# Patient Record
Sex: FEMALE | Race: WHITE | HISPANIC OR LATINO | Employment: OTHER | URBAN - METROPOLITAN AREA
[De-identification: names, ages, dates, MRNs, and addresses within clinical notes are randomized per-mention and may not be internally consistent; named-entity substitution may affect disease eponyms.]

---

## 2018-01-08 ENCOUNTER — TELEPHONE (OUTPATIENT)
Dept: ADMINISTRATIVE | Facility: HOSPITAL | Age: 43
End: 2018-01-08

## 2018-04-12 PROBLEM — F41.9 ANXIETY: Status: ACTIVE | Noted: 2018-04-12

## 2018-04-12 PROBLEM — R01.1 HEART MURMUR: Status: ACTIVE | Noted: 2018-04-12

## 2018-04-12 PROBLEM — J45.41 MODERATE PERSISTENT ASTHMA WITH ACUTE EXACERBATION: Status: ACTIVE | Noted: 2018-04-12

## 2020-09-02 PROBLEM — J45.41 MODERATE PERSISTENT ASTHMA WITH ACUTE EXACERBATION: Status: RESOLVED | Noted: 2018-04-12 | Resolved: 2020-09-02

## 2021-07-01 ENCOUNTER — PATIENT MESSAGE (OUTPATIENT)
Dept: ADMINISTRATIVE | Facility: OTHER | Age: 46
End: 2021-07-01

## 2022-06-09 PROBLEM — J45.901 ASTHMA EXACERBATION: Status: ACTIVE | Noted: 2018-04-12

## 2022-06-09 PROBLEM — R06.03 ACUTE RESPIRATORY DISTRESS: Status: RESOLVED | Noted: 2022-06-09 | Resolved: 2022-06-09

## 2022-06-09 PROBLEM — R06.03 ACUTE RESPIRATORY DISTRESS: Status: ACTIVE | Noted: 2022-06-09

## 2022-06-11 PROBLEM — J96.01 ACUTE HYPOXEMIC RESPIRATORY FAILURE: Status: ACTIVE | Noted: 2022-06-11

## 2022-06-13 PROBLEM — J96.01 ACUTE HYPOXEMIC RESPIRATORY FAILURE: Status: ACTIVE | Noted: 2018-04-12

## 2022-06-13 PROBLEM — J44.1 COPD EXACERBATION: Status: ACTIVE | Noted: 2022-06-13

## 2022-06-13 PROBLEM — J96.01 ACUTE HYPOXEMIC RESPIRATORY FAILURE: Status: RESOLVED | Noted: 2018-04-12 | Resolved: 2022-06-13

## 2023-03-29 PROBLEM — R06.02 SOB (SHORTNESS OF BREATH): Status: ACTIVE | Noted: 2023-03-29

## 2023-03-29 PROBLEM — R06.02 SOB (SHORTNESS OF BREATH): Status: RESOLVED | Noted: 2023-03-29 | Resolved: 2023-03-29

## 2023-03-29 PROBLEM — J44.1 COPD EXACERBATION: Status: RESOLVED | Noted: 2022-06-13 | Resolved: 2023-03-29

## 2023-07-10 PROBLEM — I35.0 AORTIC STENOSIS, MODERATE: Status: ACTIVE | Noted: 2018-04-12

## 2023-09-05 PROBLEM — R94.31 ABNORMAL EKG: Status: ACTIVE | Noted: 2023-09-05

## 2023-09-05 PROBLEM — Q23.81 BICUSPID AORTIC VALVE: Status: ACTIVE | Noted: 2023-09-05

## 2023-09-05 PROBLEM — I34.0 NONRHEUMATIC MITRAL VALVE REGURGITATION: Status: ACTIVE | Noted: 2023-09-05

## 2023-09-05 PROBLEM — R06.09 DYSPNEA ON EXERTION: Status: ACTIVE | Noted: 2023-09-05

## 2023-09-05 PROBLEM — Q26.1 PERSISTENT LEFT SVC (SUPERIOR VENA CAVA): Status: ACTIVE | Noted: 2023-09-05

## 2023-09-05 PROBLEM — Q23.1 BICUSPID AORTIC VALVE: Status: ACTIVE | Noted: 2023-09-05

## 2023-09-05 PROBLEM — I35.0 AORTIC STENOSIS, MODERATE: Status: RESOLVED | Noted: 2018-04-12 | Resolved: 2023-09-05

## 2023-09-05 PROBLEM — Z01.818 PRE-OP EVALUATION: Status: ACTIVE | Noted: 2023-09-05

## 2023-09-05 PROBLEM — I51.7 LEFT ATRIAL ENLARGEMENT: Status: ACTIVE | Noted: 2023-09-05

## 2023-09-05 PROBLEM — I35.1 NONRHEUMATIC AORTIC VALVE INSUFFICIENCY: Status: ACTIVE | Noted: 2023-09-05

## 2023-09-05 PROBLEM — I35.0 NONRHEUMATIC AORTIC VALVE STENOSIS: Status: ACTIVE | Noted: 2023-09-05

## 2023-10-27 ENCOUNTER — PATIENT MESSAGE (OUTPATIENT)
Dept: CARDIOTHORACIC SURGERY | Facility: CLINIC | Age: 48
End: 2023-10-27
Payer: MEDICAID

## 2023-10-27 ENCOUNTER — TELEPHONE (OUTPATIENT)
Dept: CARDIOTHORACIC SURGERY | Facility: CLINIC | Age: 48
End: 2023-10-27
Payer: MEDICAID

## 2023-10-31 ENCOUNTER — TELEPHONE (OUTPATIENT)
Dept: CARDIOTHORACIC SURGERY | Facility: CLINIC | Age: 48
End: 2023-10-31
Payer: MEDICAID

## 2023-10-31 NOTE — PROGRESS NOTES
Subjective:      Patient ID: Kendra Will is a 48 y.o. female.    Chief Complaint: No chief complaint on file.      HPI:  Kendra Will is a 48 y.o. female who presents for surgical evaluation of AS. Medical conditions include asthma, bipolar, HIV, depression, anxiety, stroke, arthritis, bicuspid aortic valve. Pt with NYHA II GARCIA, no chest pain, rare LE edema, no PND, no orthopnea, no syncope, no palpitations, no lightheadedness or dizziness. Referred by Dr. Wray for AVR. Last CD4 count 597. Viral load was detected. Saw ID in July and stated that she often forgets to take her medication. Per ID note from December, patient was snorting amphetamines to cope with stressors. Tox screen from September was negative for amphetamine but many in the past were positive.       Family and social history reviewed    Current Outpatient Medications   Medication Instructions    albuterol (PROVENTIL/VENTOLIN HFA) 90 mcg/actuation inhaler 2 puffs, Inhalation, Every 6 hours PRN, Rescue    albuterol-ipratropium (DUO-NEB) 2.5 mg-0.5 mg/3 mL nebulizer solution 3 mLs, Nebulization, Every 6 hours while awake, Rescue    cetirizine (ZYRTEC) 10 mg, Oral, Daily    cyclobenzaprine (FLEXERIL) 10 mg, Oral, 3 times daily PRN    elviteg-cob-emtri-tenof ALAFEN (GENVOYA) [phone] mg Tab 1 tablet, Oral, Daily    ergocalciferol (VITAMIN D2) 50,000 unit Cap Take 1 capsule (50,000 Units total) by mouth every 7 days.    fluticasone propionate (FLONASE) 50 mcg, Each Nostril, Daily    fluticasone-salmeterol 230-21 mcg/dose (ADVAIR HFA) 230-21 mcg/actuation HFAA inhaler 2 puffs, Inhalation, 2 times daily, Controller    ibuprofen (ADVIL,MOTRIN) 800 MG tablet TAKE ONE TABLET BY MOUTH EVERY 6 HOURS AS NEEDED FOR PAIN    montelukast (SINGULAIR) 10 mg, Oral, Daily    risperiDONE (RISPERDAL) 3 mg, Oral, 2 times daily         Review of patient's allergies indicates:   Allergen Reactions    Neurontin [gabapentin] Other (See Comments)     confusion     Soma [carisoprodol] Other (See Comments)     weakness    Symbicort [budesonide-formoterol] Other (See Comments)     Sore throat( did not get with advair)    Latex, natural rubber Rash    Levofloxacin Rash     Past Medical History:   Diagnosis Date    Anxiety     Asthma     Bipolar affective     Depression     HIV (human immunodeficiency virus infection)     Immune deficiency disorder     Insomnia     Migraine headache     Mitral valve prolapse     Stroke      Past Surgical History:   Procedure Laterality Date    CERVICAL FUSION  5/13/2014    CORONARY ANGIOGRAPHY N/A 9/25/2023    Procedure: ANGIOGRAM, CORONARY ARTERY;  Surgeon: Micah Medel MD;  Location: [facility] CATH LAB;  Service: Cardiology;  Laterality: N/A;    HAND SURGERY      LEFT HEART CATHETERIZATION Left 9/25/2023    Procedure: Left heart cath;  Surgeon: Micah Medel MD;  Location: [facility] CATH LAB;  Service: Cardiology;  Laterality: Left;    NECK SURGERY       Family History       Problem Relation (Age of Onset)    Breast cancer Paternal Aunt    Cancer Paternal Grandmother    Heart disease Maternal Grandfather    Hyperlipidemia Paternal Grandmother, Maternal Grandmother    Hypertension Mother, Maternal Grandmother    Lupus Sister    Stroke Paternal Grandmother, Maternal Grandmother          Social History     Socioeconomic History    Marital status:     Years of education: college   Tobacco Use    Smoking status: Never    Smokeless tobacco: Never   Substance and Sexual Activity    Alcohol use: No     Alcohol/week: 0.0 standard drinks of alcohol    Drug use: Not Currently     Comment: pt. states her  was giving her drugs and she doesnt know about it    Sexual activity: Never       Current medications Reviewed    Review of Systems   Constitutional:  Negative for fatigue.   HENT:  Negative for nosebleeds.    Eyes:  Negative for visual disturbance.   Respiratory:  Positive for shortness of breath.    Cardiovascular:  Negative for chest  pain and leg swelling.   Gastrointestinal:  Negative for nausea.   Musculoskeletal:  Negative for gait problem.   Skin:  Negative for color change.   Neurological:  Negative for dizziness.   Hematological:  Does not bruise/bleed easily.   Psychiatric/Behavioral:  Negative for sleep disturbance.      Objective:   Physical Exam  Constitutional:       General: She is not in acute distress.  HENT:      Head: Normocephalic and atraumatic.   Eyes:      Pupils: Pupils are equal, round, and reactive to light.   Cardiovascular:      Rate and Rhythm: Normal rate.   Pulmonary:      Effort: Pulmonary effort is normal. No respiratory distress.   Musculoskeletal:         General: Normal range of motion.      Cervical back: Normal range of motion.   Skin:     Coloration: Skin is not pale.   Neurological:      General: No focal deficit present.      Mental Status: She is alert.   Psychiatric:         Mood and Affect: Mood normal.         Behavior: Behavior normal.         Diagnostic Results: reviewed   [facility] 9/25/23    The coronary arteries were normal    Follow up with Dr. Wray as scheduled    AVR pre-operative planning per primary cardiologist's plan of care    CTA 9/7/23  Maximum diameter of the ascending thoracic aorta is 3.3 cm.     Dependent atelectasis in the posterior aspects of the lower lobes bilaterally.    Carotid US 9/5/23     No definite plaque or evidence of hemodynamically significant stenosis detected bilaterally.      TTE 8/30/23    Left Ventricle: The left ventricle is normal in size. Ventricular mass is normal. Normal wall thickness. Normal wall motion. There is normal systolic function with a visually estimated ejection fraction of 55 - 70%. Biplane (2D) method of discs ejection fraction is 56%. Global longitudinal strain is -16.4%. There is normal diastolic function.    Left Atrium: Left atrium is mildly dilated.    Right Ventricle: Normal right ventricular cavity size. Systolic function is normal.    Right  Atrium: Normal right atrial size.    Aortic Valve: The aortic valve is a bicuspid valve. There is severe aortic valve sclerosis. Severely calcified cusps. Severely restricted motion. There is severe stenosis. Aortic valve area by VTI is 0.68 cm². Aortic valve peak velocity is 4.62 m/s. Mean gradient is 49 mmHg. The dimensionless index is 0.19. There is mild aortic regurgitation.    Mitral Valve: The mitral valve is structurally normal. There is mild regurgitation.    Tricuspid Valve: The tricuspid valve is structurally normal. There is trace regurgitation.    Pulmonic Valve: The pulmonic valve is structurally normal. There is no stenosis. The estimated PA systolic pressure is 35 mmHg. There is trace paravalvular regurgitation.    Aorta: Aortic root is normal in size. Ascending aorta is normal measuring 2.87 cm.    Pulmonary Artery: No pulmonary hypertension. The estimated pulmonary artery systolic pressure is 35 mmHg.    IVC/SVC: Normal venous pressure at 3 mmHg.    Pericardium: There is no pericardial effusion.    Assessment:   AS  Plan:     I have seen the patient and reviewed the physician assistant's note above. I have personally interviewed and examined the patient at bedside and agree with the findings.     Ms. Will is a 48 year-old woman (possible prior drug abuser) with a history of bicuspid aortic valve with severe aortic stenosis, persistent left superior vena cava, prior stroke, asthma, bipolar disorder, and HIV.  Recently, she has been symptomatic with dyspnea on exertion symptoms interfering with her quality of life.  Her most recent echocardiogram showed 56% ejection fraction with left ventricular hypertrophy, bicuspid aortic valve with severe aortic stenosis (NELSY 0.68cm2, velocity 4.62 m/s, mean gradient 49 mmHg), mild aortic regurgitation, and mild mitral regurgitation.  Angiogram showed nonobstructive disease.    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    We had an extensive discussion with her regarding the ACC/AHA guidelines and we agreed that she has indications for surgery involving Ross operation vs mechanical aortic valve replacement.  Due to the innominate vein connecting both superior vena cavae, we can likely snare the left superior vena cava instead of cannulating it.  We went through the risks and benefits as well as the perioperative expectations and we agreed she is an appropriate surgical candidate.  We have tentatively scheduled her surgery for Monday, November 13, and preop on Wednesday, November 8.        Nicola Montgomery MD  Cardiothoracic Surgery  [facility]

## 2023-10-31 NOTE — TELEPHONE ENCOUNTER
Called pt to confirm appointment. Left detailed message with call back number.    Julie Haase RN  [facility] Nurse Navigator [phone]

## 2023-11-01 ENCOUNTER — OFFICE VISIT (OUTPATIENT)
Dept: CARDIOTHORACIC SURGERY | Facility: CLINIC | Age: 48
End: 2023-11-01
Payer: MEDICAID

## 2023-11-01 VITALS
HEIGHT: 59 IN | HEART RATE: 77 BPM | SYSTOLIC BLOOD PRESSURE: 116 MMHG | BODY MASS INDEX: 27.78 KG/M2 | OXYGEN SATURATION: 99 % | WEIGHT: 137.81 LBS | DIASTOLIC BLOOD PRESSURE: 75 MMHG

## 2023-11-01 DIAGNOSIS — I35.0 NONRHEUMATIC AORTIC VALVE STENOSIS: Primary | ICD-10-CM

## 2023-11-01 DIAGNOSIS — Q23.1 BICUSPID AORTIC VALVE: ICD-10-CM

## 2023-11-01 DIAGNOSIS — Z01.818 PRE-OP TESTING: ICD-10-CM

## 2023-11-01 DIAGNOSIS — J45.909 ASTHMA, UNSPECIFIED ASTHMA SEVERITY, UNSPECIFIED WHETHER COMPLICATED, UNSPECIFIED WHETHER PERSISTENT: ICD-10-CM

## 2023-11-01 DIAGNOSIS — J45.40 MODERATE PERSISTENT ASTHMA WITHOUT COMPLICATION: ICD-10-CM

## 2023-11-01 PROCEDURE — 3078F DIAST BP <80 MM HG: CPT | Mod: CPTII,,, | Performed by: THORACIC SURGERY (CARDIOTHORACIC VASCULAR SURGERY)

## 2023-11-01 PROCEDURE — 1159F MED LIST DOCD IN RCRD: CPT | Mod: CPTII,,, | Performed by: THORACIC SURGERY (CARDIOTHORACIC VASCULAR SURGERY)

## 2023-11-01 PROCEDURE — 3078F PR MOST RECENT DIASTOLIC BLOOD PRESSURE < 80 MM HG: ICD-10-PCS | Mod: CPTII,,, | Performed by: THORACIC SURGERY (CARDIOTHORACIC VASCULAR SURGERY)

## 2023-11-01 PROCEDURE — 3074F SYST BP LT 130 MM HG: CPT | Mod: CPTII,,, | Performed by: THORACIC SURGERY (CARDIOTHORACIC VASCULAR SURGERY)

## 2023-11-01 PROCEDURE — 99205 PR OFFICE/OUTPT VISIT, NEW, LEVL V, 60-74 MIN: ICD-10-PCS | Mod: S$PBB,,, | Performed by: THORACIC SURGERY (CARDIOTHORACIC VASCULAR SURGERY)

## 2023-11-01 PROCEDURE — 99205 OFFICE O/P NEW HI 60 MIN: CPT | Mod: S$PBB,,, | Performed by: THORACIC SURGERY (CARDIOTHORACIC VASCULAR SURGERY)

## 2023-11-01 PROCEDURE — 99999 PR PBB SHADOW E&M-EST. PATIENT-LVL III: CPT | Mod: PBBFAC,,, | Performed by: THORACIC SURGERY (CARDIOTHORACIC VASCULAR SURGERY)

## 2023-11-01 PROCEDURE — 99999 PR PBB SHADOW E&M-EST. PATIENT-LVL III: ICD-10-PCS | Mod: PBBFAC,,, | Performed by: THORACIC SURGERY (CARDIOTHORACIC VASCULAR SURGERY)

## 2023-11-01 PROCEDURE — 99213 OFFICE O/P EST LOW 20 MIN: CPT | Mod: PBBFAC | Performed by: THORACIC SURGERY (CARDIOTHORACIC VASCULAR SURGERY)

## 2023-11-01 PROCEDURE — 3008F PR BODY MASS INDEX (BMI) DOCUMENTED: ICD-10-PCS | Mod: CPTII,,, | Performed by: THORACIC SURGERY (CARDIOTHORACIC VASCULAR SURGERY)

## 2023-11-01 PROCEDURE — 1159F PR MEDICATION LIST DOCUMENTED IN MEDICAL RECORD: ICD-10-PCS | Mod: CPTII,,, | Performed by: THORACIC SURGERY (CARDIOTHORACIC VASCULAR SURGERY)

## 2023-11-01 PROCEDURE — 3008F BODY MASS INDEX DOCD: CPT | Mod: CPTII,,, | Performed by: THORACIC SURGERY (CARDIOTHORACIC VASCULAR SURGERY)

## 2023-11-01 PROCEDURE — 3074F PR MOST RECENT SYSTOLIC BLOOD PRESSURE < 130 MM HG: ICD-10-PCS | Mod: CPTII,,, | Performed by: THORACIC SURGERY (CARDIOTHORACIC VASCULAR SURGERY)

## 2023-11-01 NOTE — LETTER
[facility] - Cardiovasc Surg 2nd Fl  [address]  [place] [address]  Phone: [phone]               November 1, 2023      Patient: Kendra Will   MR Number: [id number]   YOB: 1975   Date of Visit: 11/1/2023     Jose Wray MD  [address]  [place] [address]    Dear Dr. Wray,     It has been a privilege for us to see your patient, Ms. Will, at our [facility].  We had a chance to meet with her and went over the history, echocardiographic, as well as angiographic findings in detail.  As you know, she is a 48 year-old woman (possible prior IVDA) with a history of bicuspid aortic valve with severe aortic stenosis, persistent left superior vena cava, prior stroke, asthma, bipolar disorder, and HIV.  Recently, she has been symptomatic with dyspnea on exertion symptoms interfering with her quality of life.  Her most recent echocardiogram showed 56% ejection fraction with left ventricular hypertrophy, bicuspid aortic valve with severe aortic stenosis (NELSY 0.68cm2, velocity 4.62 m/s, mean gradient 49 mmHg), mild aortic regurgitation, and mild mitral regurgitation.  Angiogram showed nonobstructive disease.    CTA chest (PE study) showed persistent left superior vena cava (draining into coronary sinus) with connecting innominate vein to right superior vena cava, 3.2cm ascending aorta, minimal ascending aortic and aortic arch calcifications.    We had an extensive discussion with her regarding the ACC/AHA guidelines and we agreed that she has indications for surgery involving Ross operation vs mechanical aortic valve replacement.  Due to the innominate vein connecting both superior vena cavae, we can likely snare the left superior vena cava instead of cannulating it.  We went through the risks and benefits as well as the perioperative expectations and we agreed she is an appropriate surgical candidate.  We have tentatively scheduled her surgery for Monday,  November 13, and preop on Wednesday, November 8.  I will be delighted to contact you around the time of her surgery.    Thank you for referring Ms. Will and for your trust and confidence in our [facility].    Sincerely,     Nicola Montgomery MD  Cardiothoracic Surgery  [facility]

## 2023-11-02 ENCOUNTER — TELEPHONE (OUTPATIENT)
Dept: CARDIOTHORACIC SURGERY | Facility: CLINIC | Age: 48
End: 2023-11-02
Payer: MEDICAID

## 2023-11-02 NOTE — TELEPHONE ENCOUNTER
Called patient to review medication list and update her on pre-op appointments.     Julie Haase RN  [facility] Nurse Navigator [phone]

## 2023-11-07 NOTE — PROGRESS NOTES
Subjective:      Patient ID: Kendra Will is a 48 y.o. female.    Chief Complaint: No chief complaint on file.      HPI:  Kendra Will is a 48 y.o. female who presents for pre-op. Medical conditions include asthma, bipolar, HIV, depression, anxiety, stroke, arthritis, bicuspid aortic valve. Pt with NYHA II GARCIA, no chest pain, rare LE edema, no PND, no orthopnea, no syncope, no palpitations, no lightheadedness or dizziness. Referred by Dr. Wray for AVR. Last CD4 count 597. Viral load was detected. Saw ID in July and stated that she often forgets to take her medication. Per ID note from December, patient was snorting amphetamines to cope with stressors. Tox screen from September was negative for amphetamine but many in the past were positive.     HR 55 on pre-op EKG       Family and social history reviewed    Current Outpatient Medications   Medication Instructions    albuterol (PROVENTIL/VENTOLIN HFA) 90 mcg/actuation inhaler 2 puffs, Inhalation, Every 6 hours PRN, Rescue    albuterol-ipratropium (DUO-NEB) 2.5 mg-0.5 mg/3 mL nebulizer solution 3 mLs, Nebulization, Every 6 hours while awake, Rescue    cetirizine (ZYRTEC) 10 mg, Oral, Daily    cyclobenzaprine (FLEXERIL) 10 mg, Oral, 3 times daily PRN    elviteg-cob-emtri-tenof ALAFEN (GENVOYA) [phone] mg Tab 1 tablet, Oral, Daily    ergocalciferol (VITAMIN D2) 50,000 unit Cap Take 1 capsule (50,000 Units total) by mouth every 7 days.    fluticasone propionate (FLONASE) 50 mcg, Each Nostril, Daily    fluticasone-salmeterol 230-21 mcg/dose (ADVAIR HFA) 230-21 mcg/actuation HFAA inhaler 2 puffs, Inhalation, 2 times daily, Controller    ibuprofen (ADVIL,MOTRIN) 800 MG tablet TAKE ONE TABLET BY MOUTH EVERY 6 HOURS AS NEEDED FOR PAIN    montelukast (SINGULAIR) 10 mg, Oral, Daily    risperiDONE (RISPERDAL) 3 mg, Oral, 2 times daily       Review of patient's allergies indicates:   Allergen Reactions    Neurontin [gabapentin] Other (See Comments)      confusion    Soma [carisoprodol] Other (See Comments)     weakness    Symbicort [budesonide-formoterol] Other (See Comments)     Sore throat( did not get with advair)    Latex, natural rubber Rash    Levofloxacin Rash     Past Medical History:   Diagnosis Date    Anxiety     Asthma     Bipolar affective     Depression     HIV (human immunodeficiency virus infection)     Immune deficiency disorder     Insomnia     Migraine headache     Mitral valve prolapse     Stroke      Past Surgical History:   Procedure Laterality Date    CERVICAL FUSION  5/13/2014    CORONARY ANGIOGRAPHY N/A 9/25/2023    Procedure: ANGIOGRAM, CORONARY ARTERY;  Surgeon: Micah Medel MD;  Location: [facility] CATH LAB;  Service: Cardiology;  Laterality: N/A;    HAND SURGERY      LEFT HEART CATHETERIZATION Left 9/25/2023    Procedure: Left heart cath;  Surgeon: Micah Medel MD;  Location: [facility] CATH LAB;  Service: Cardiology;  Laterality: Left;    NECK SURGERY       Family History       Problem Relation (Age of Onset)    Breast cancer Paternal Aunt    Cancer Paternal Grandmother    Heart disease Maternal Grandfather    Hyperlipidemia Paternal Grandmother, Maternal Grandmother    Hypertension Mother, Maternal Grandmother    Lupus Sister    Stroke Paternal Grandmother, Maternal Grandmother          Social History     Socioeconomic History    Marital status:     Years of education: college   Tobacco Use    Smoking status: Never    Smokeless tobacco: Never   Substance and Sexual Activity    Alcohol use: No     Alcohol/week: 0.0 standard drinks of alcohol    Drug use: Not Currently     Comment: pt. states her  was giving her drugs and she doesnt know about it    Sexual activity: Never       Current medications Reviewed    Review of Systems   Constitutional:  Negative for fatigue.   HENT:  Negative for nosebleeds.    Eyes:  Negative for visual disturbance.   Respiratory:  Positive for shortness of breath.    Cardiovascular:  Negative  for chest pain.   Gastrointestinal:  Negative for nausea.   Musculoskeletal:  Negative for gait problem.   Skin:  Negative for color change.   Neurological:  Negative for seizures.   Hematological:  Does not bruise/bleed easily.   Psychiatric/Behavioral:  Negative for sleep disturbance.      Objective:   Physical Exam  Constitutional:       General: She is not in acute distress.  HENT:      Head: Normocephalic and atraumatic.   Eyes:      Pupils: Pupils are equal, round, and reactive to light.   Cardiovascular:      Rate and Rhythm: Normal rate.   Pulmonary:      Effort: Pulmonary effort is normal. No respiratory distress.   Abdominal:      General: There is no distension.   Musculoskeletal:         General: Normal range of motion.      Cervical back: Normal range of motion.   Skin:     Coloration: Skin is not pale.   Neurological:      General: No focal deficit present.      Mental Status: She is alert.   Psychiatric:         Mood and Affect: Mood normal.         Behavior: Behavior normal.         Diagnostic Results:   [facility] 9/25/23    The coronary arteries were normal    Follow up with Dr. Wray as scheduled    AVR pre-operative planning per primary cardiologist's plan of care     CTA 9/7/23  Maximum diameter of the ascending thoracic aorta is 3.3 cm.     Dependent atelectasis in the posterior aspects of the lower lobes bilaterally.     Carotid US 9/5/23     No definite plaque or evidence of hemodynamically significant stenosis detected bilaterally.        TTE 8/30/23    Left Ventricle: The left ventricle is normal in size. Ventricular mass is normal. Normal wall thickness. Normal wall motion. There is normal systolic function with a visually estimated ejection fraction of 55 - 70%. Biplane (2D) method of discs ejection fraction is 56%. Global longitudinal strain is -16.4%. There is normal diastolic function.    Left Atrium: Left atrium is mildly dilated.    Right Ventricle: Normal right ventricular cavity size.  Systolic function is normal.    Right Atrium: Normal right atrial size.    Aortic Valve: The aortic valve is a bicuspid valve. There is severe aortic valve sclerosis. Severely calcified cusps. Severely restricted motion. There is severe stenosis. Aortic valve area by VTI is 0.68 cm². Aortic valve peak velocity is 4.62 m/s. Mean gradient is 49 mmHg. The dimensionless index is 0.19. There is mild aortic regurgitation.    Mitral Valve: The mitral valve is structurally normal. There is mild regurgitation.    Tricuspid Valve: The tricuspid valve is structurally normal. There is trace regurgitation.    Pulmonic Valve: The pulmonic valve is structurally normal. There is no stenosis. The estimated PA systolic pressure is 35 mmHg. There is trace paravalvular regurgitation.    Aorta: Aortic root is normal in size. Ascending aorta is normal measuring 2.87 cm.    Pulmonary Artery: No pulmonary hypertension. The estimated pulmonary artery systolic pressure is 35 mmHg.    IVC/SVC: Normal venous pressure at 3 mmHg.    Pericardium: There is no pericardial effusion.     Assessment:   AS with bicuspid valve   Plan:       I have seen the patient and reviewed the physician assistant's note above. I have personally interviewed and examined the patient at bedside and agree with the findings.      Ms. Will is a 48 year-old woman (possible prior drug abuser) with a history of bicuspid aortic valve with severe aortic stenosis, persistent left superior vena cava, prior stroke, asthma, bipolar disorder, and HIV.  Recently, she has been symptomatic with dyspnea on exertion symptoms interfering with her quality of life.  Her most recent echocardiogram showed 56% ejection fraction with left ventricular hypertrophy, bicuspid aortic valve with severe aortic stenosis (NELSY 0.68cm2, velocity 4.62 m/s, mean gradient 49 mmHg), mild aortic regurgitation, and mild mitral regurgitation.  Angiogram showed nonobstructive disease.     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     We had an extensive discussion with her regarding the ACC/AHA guidelines and we agreed that she has indications for surgery involving Ross operation vs mechanical aortic valve replacement.  Due to the innominate vein connecting both superior vena cavae, we can likely snare the left superior vena cava instead of cannulating it.  We went through the risks and benefits as well as the perioperative expectations and we agreed she is an appropriate surgical candidate.  We have tentatively scheduled her surgery for Monday, November 13, 2023.      Nicola Montgomery MD  Cardiothoracic Surgery  [facility]

## 2023-11-07 NOTE — H&P (VIEW-ONLY)
Subjective:      Patient ID: Kendra Will is a 48 y.o. female.    Chief Complaint: No chief complaint on file.      HPI:  Kendra Will is a 48 y.o. female who presents for pre-op. Medical conditions include asthma, bipolar, HIV, depression, anxiety, stroke, arthritis, bicuspid aortic valve. Pt with NYHA II GARCIA, no chest pain, rare LE edema, no PND, no orthopnea, no syncope, no palpitations, no lightheadedness or dizziness. Referred by Dr. Wray for AVR. Last CD4 count 597. Viral load was detected. Saw ID in July and stated that she often forgets to take her medication. Per ID note from December, patient was snorting amphetamines to cope with stressors. Tox screen from September was negative for amphetamine but many in the past were positive.     HR 55 on pre-op EKG       Family and social history reviewed    Current Outpatient Medications   Medication Instructions    albuterol (PROVENTIL/VENTOLIN HFA) 90 mcg/actuation inhaler 2 puffs, Inhalation, Every 6 hours PRN, Rescue    albuterol-ipratropium (DUO-NEB) 2.5 mg-0.5 mg/3 mL nebulizer solution 3 mLs, Nebulization, Every 6 hours while awake, Rescue    cetirizine (ZYRTEC) 10 mg, Oral, Daily    cyclobenzaprine (FLEXERIL) 10 mg, Oral, 3 times daily PRN    elviteg-cob-emtri-tenof ALAFEN (GENVOYA) [phone] mg Tab 1 tablet, Oral, Daily    ergocalciferol (VITAMIN D2) 50,000 unit Cap Take 1 capsule (50,000 Units total) by mouth every 7 days.    fluticasone propionate (FLONASE) 50 mcg, Each Nostril, Daily    fluticasone-salmeterol 230-21 mcg/dose (ADVAIR HFA) 230-21 mcg/actuation HFAA inhaler 2 puffs, Inhalation, 2 times daily, Controller    ibuprofen (ADVIL,MOTRIN) 800 MG tablet TAKE ONE TABLET BY MOUTH EVERY 6 HOURS AS NEEDED FOR PAIN    montelukast (SINGULAIR) 10 mg, Oral, Daily    risperiDONE (RISPERDAL) 3 mg, Oral, 2 times daily       Review of patient's allergies indicates:   Allergen Reactions    Neurontin [gabapentin] Other (See Comments)      confusion    Soma [carisoprodol] Other (See Comments)     weakness    Symbicort [budesonide-formoterol] Other (See Comments)     Sore throat( did not get with advair)    Latex, natural rubber Rash    Levofloxacin Rash     Past Medical History:   Diagnosis Date    Anxiety     Asthma     Bipolar affective     Depression     HIV (human immunodeficiency virus infection)     Immune deficiency disorder     Insomnia     Migraine headache     Mitral valve prolapse     Stroke      Past Surgical History:   Procedure Laterality Date    CERVICAL FUSION  5/13/2014    CORONARY ANGIOGRAPHY N/A 9/25/2023    Procedure: ANGIOGRAM, CORONARY ARTERY;  Surgeon: Micah Medel MD;  Location: [facility] CATH LAB;  Service: Cardiology;  Laterality: N/A;    HAND SURGERY      LEFT HEART CATHETERIZATION Left 9/25/2023    Procedure: Left heart cath;  Surgeon: Micah Medel MD;  Location: [facility] CATH LAB;  Service: Cardiology;  Laterality: Left;    NECK SURGERY       Family History       Problem Relation (Age of Onset)    Breast cancer Paternal Aunt    Cancer Paternal Grandmother    Heart disease Maternal Grandfather    Hyperlipidemia Paternal Grandmother, Maternal Grandmother    Hypertension Mother, Maternal Grandmother    Lupus Sister    Stroke Paternal Grandmother, Maternal Grandmother          Social History     Socioeconomic History    Marital status:     Years of education: college   Tobacco Use    Smoking status: Never    Smokeless tobacco: Never   Substance and Sexual Activity    Alcohol use: No     Alcohol/week: 0.0 standard drinks of alcohol    Drug use: Not Currently     Comment: pt. states her  was giving her drugs and she doesnt know about it    Sexual activity: Never       Current medications Reviewed    Review of Systems   Constitutional:  Negative for fatigue.   HENT:  Negative for nosebleeds.    Eyes:  Negative for visual disturbance.   Respiratory:  Positive for shortness of breath.    Cardiovascular:  Negative  for chest pain.   Gastrointestinal:  Negative for nausea.   Musculoskeletal:  Negative for gait problem.   Skin:  Negative for color change.   Neurological:  Negative for seizures.   Hematological:  Does not bruise/bleed easily.   Psychiatric/Behavioral:  Negative for sleep disturbance.      Objective:   Physical Exam  Constitutional:       General: She is not in acute distress.  HENT:      Head: Normocephalic and atraumatic.   Eyes:      Pupils: Pupils are equal, round, and reactive to light.   Cardiovascular:      Rate and Rhythm: Normal rate.   Pulmonary:      Effort: Pulmonary effort is normal. No respiratory distress.   Abdominal:      General: There is no distension.   Musculoskeletal:         General: Normal range of motion.      Cervical back: Normal range of motion.   Skin:     Coloration: Skin is not pale.   Neurological:      General: No focal deficit present.      Mental Status: She is alert.   Psychiatric:         Mood and Affect: Mood normal.         Behavior: Behavior normal.         Diagnostic Results:   [facility] 9/25/23    The coronary arteries were normal    Follow up with Dr. Wray as scheduled    AVR pre-operative planning per primary cardiologist's plan of care     CTA 9/7/23  Maximum diameter of the ascending thoracic aorta is 3.3 cm.     Dependent atelectasis in the posterior aspects of the lower lobes bilaterally.     Carotid US 9/5/23     No definite plaque or evidence of hemodynamically significant stenosis detected bilaterally.        TTE 8/30/23    Left Ventricle: The left ventricle is normal in size. Ventricular mass is normal. Normal wall thickness. Normal wall motion. There is normal systolic function with a visually estimated ejection fraction of 55 - 70%. Biplane (2D) method of discs ejection fraction is 56%. Global longitudinal strain is -16.4%. There is normal diastolic function.    Left Atrium: Left atrium is mildly dilated.    Right Ventricle: Normal right ventricular cavity size.  Systolic function is normal.    Right Atrium: Normal right atrial size.    Aortic Valve: The aortic valve is a bicuspid valve. There is severe aortic valve sclerosis. Severely calcified cusps. Severely restricted motion. There is severe stenosis. Aortic valve area by VTI is 0.68 cm². Aortic valve peak velocity is 4.62 m/s. Mean gradient is 49 mmHg. The dimensionless index is 0.19. There is mild aortic regurgitation.    Mitral Valve: The mitral valve is structurally normal. There is mild regurgitation.    Tricuspid Valve: The tricuspid valve is structurally normal. There is trace regurgitation.    Pulmonic Valve: The pulmonic valve is structurally normal. There is no stenosis. The estimated PA systolic pressure is 35 mmHg. There is trace paravalvular regurgitation.    Aorta: Aortic root is normal in size. Ascending aorta is normal measuring 2.87 cm.    Pulmonary Artery: No pulmonary hypertension. The estimated pulmonary artery systolic pressure is 35 mmHg.    IVC/SVC: Normal venous pressure at 3 mmHg.    Pericardium: There is no pericardial effusion.     Assessment:   AS with bicuspid valve   Plan:       I have seen the patient and reviewed the physician assistant's note above. I have personally interviewed and examined the patient at bedside and agree with the findings.      Ms. Will is a 48 year-old woman (possible prior drug abuser) with a history of bicuspid aortic valve with severe aortic stenosis, persistent left superior vena cava, prior stroke, asthma, bipolar disorder, and HIV.  Recently, she has been symptomatic with dyspnea on exertion symptoms interfering with her quality of life.  Her most recent echocardiogram showed 56% ejection fraction with left ventricular hypertrophy, bicuspid aortic valve with severe aortic stenosis (NELSY 0.68cm2, velocity 4.62 m/s, mean gradient 49 mmHg), mild aortic regurgitation, and mild mitral regurgitation.  Angiogram showed nonobstructive disease.     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     We had an extensive discussion with her regarding the ACC/AHA guidelines and we agreed that she has indications for surgery involving Ross operation vs mechanical aortic valve replacement.  Due to the innominate vein connecting both superior vena cavae, we can likely snare the left superior vena cava instead of cannulating it.  We went through the risks and benefits as well as the perioperative expectations and we agreed she is an appropriate surgical candidate.  We have tentatively scheduled her surgery for Monday, November 13, 2023.      Nicola Montgomery MD  Cardiothoracic Surgery  [facility]

## 2023-11-08 ENCOUNTER — OFFICE VISIT (OUTPATIENT)
Dept: CARDIOTHORACIC SURGERY | Facility: CLINIC | Age: 48
End: 2023-11-08
Payer: MEDICAID

## 2023-11-08 ENCOUNTER — DOCUMENTATION ONLY (OUTPATIENT)
Dept: CARDIOTHORACIC SURGERY | Facility: CLINIC | Age: 48
End: 2023-11-08

## 2023-11-08 ENCOUNTER — HOSPITAL ENCOUNTER (OUTPATIENT)
Dept: RADIOLOGY | Facility: HOSPITAL | Age: 48
Discharge: HOME | End: 2023-11-08
Attending: THORACIC SURGERY (CARDIOTHORACIC VASCULAR SURGERY)
Payer: MEDICAID

## 2023-11-08 ENCOUNTER — HOSPITAL ENCOUNTER (OUTPATIENT)
Dept: CARDIOLOGY | Facility: CLINIC | Age: 48
Discharge: HOME | End: 2023-11-08
Payer: MEDICAID

## 2023-11-08 ENCOUNTER — ANESTHESIA EVENT (OUTPATIENT)
Dept: SURGERY | Facility: HOSPITAL | Age: 48
DRG: 220 | End: 2023-11-08
Payer: MEDICAID

## 2023-11-08 VITALS
WEIGHT: 136.38 LBS | HEART RATE: 61 BPM | OXYGEN SATURATION: 98 % | DIASTOLIC BLOOD PRESSURE: 72 MMHG | SYSTOLIC BLOOD PRESSURE: 134 MMHG | BODY MASS INDEX: 27.49 KG/M2 | HEIGHT: 59 IN

## 2023-11-08 DIAGNOSIS — Z01.818 PRE-OP TESTING: ICD-10-CM

## 2023-11-08 DIAGNOSIS — Q23.1 BICUSPID AORTIC VALVE: ICD-10-CM

## 2023-11-08 DIAGNOSIS — I35.0 NONRHEUMATIC AORTIC VALVE STENOSIS: ICD-10-CM

## 2023-11-08 DIAGNOSIS — I35.0 NONRHEUMATIC AORTIC VALVE STENOSIS: Primary | ICD-10-CM

## 2023-11-08 PROCEDURE — 93010 EKG 12-LEAD: ICD-10-PCS | Mod: S$PBB,,, | Performed by: INTERNAL MEDICINE

## 2023-11-08 PROCEDURE — 99999 PR PBB SHADOW E&M-EST. PATIENT-LVL III: CPT | Mod: PBBFAC,,, | Performed by: THORACIC SURGERY (CARDIOTHORACIC VASCULAR SURGERY)

## 2023-11-08 PROCEDURE — 93010 ELECTROCARDIOGRAM REPORT: CPT | Mod: S$PBB,,, | Performed by: INTERNAL MEDICINE

## 2023-11-08 PROCEDURE — 99999 PR PBB SHADOW E&M-EST. PATIENT-LVL III: ICD-10-PCS | Mod: PBBFAC,,, | Performed by: THORACIC SURGERY (CARDIOTHORACIC VASCULAR SURGERY)

## 2023-11-08 PROCEDURE — 71046 XR CHEST PA AND LATERAL: ICD-10-PCS | Mod: 26,,, | Performed by: RADIOLOGY

## 2023-11-08 PROCEDURE — 99499 UNLISTED E&M SERVICE: CPT | Mod: S$PBB,,, | Performed by: THORACIC SURGERY (CARDIOTHORACIC VASCULAR SURGERY)

## 2023-11-08 PROCEDURE — 71046 X-RAY EXAM CHEST 2 VIEWS: CPT | Mod: 26,,, | Performed by: RADIOLOGY

## 2023-11-08 PROCEDURE — 99499 NO LOS: ICD-10-PCS | Mod: S$PBB,,, | Performed by: THORACIC SURGERY (CARDIOTHORACIC VASCULAR SURGERY)

## 2023-11-08 PROCEDURE — 93005 ELECTROCARDIOGRAM TRACING: CPT | Mod: PBBFAC | Performed by: INTERNAL MEDICINE

## 2023-11-08 PROCEDURE — 71046 X-RAY EXAM CHEST 2 VIEWS: CPT | Mod: TC

## 2023-11-08 PROCEDURE — 99213 OFFICE O/P EST LOW 20 MIN: CPT | Mod: PBBFAC,25 | Performed by: THORACIC SURGERY (CARDIOTHORACIC VASCULAR SURGERY)

## 2023-11-08 NOTE — PROGRESS NOTES
"Pt and mother given verbal and written instructions for surgery.      PREPARING FOR SURGERY    Your surgery has been scheduled for:    Day: Monday   Date:  11/13    Arrival Time: 5am    Start Time: 7am    You should report to the second floor surgery center, located on the [facility] side of the second floor of the [facility]. The phone number is [phone].         PLEASE NOTE    If you are allergic to any medications, please inform your doctor or the nurse responsible for your care.  Tell the doctor if you take aspirin, products containing aspirin, herbal medications or blood thinners, such as Coumadin, Pradaxa, or Plavix.  Notify your doctor if you are diabetic and provide information about the medications you take.  Arrange for someone to drive you home following surgery. You will not be allowed to leave the surgical facility alone or drive yourself home following sedation and anesthesia.  If you have not already done so, please bring a list of your medications with you the day of your surgery.          THE NIGHT BEFORE SURGERY    Eat a light supper on the night before your surgery, no greasy or fatty foods.    DO NOT EAT OR DRINK ANYTHING AFTER MIDNIGHT, INCLUDING GUM, HARD CANDY, MINTS, OR CHEWING TOBACCO    Take a complete shower. Wash your body from the neck down with Hibiclens (chlorhexidine gluconate) soap. Hibiclens soap may be purchased over the counter at the pharmacy. Keep the soap away from your eyes, ears, and mouth. After washing with Hibiclens, rinse thoroughly. You may also use any soap labeled "antibacterial". Shampoo your hair with your regular shampoo.         THE DAY OF SURGERY    Take another shower with Hibiclens or any antibacterial soap, to reduce the change of infection.    You may brush your teeth and rinse your mouth, but do not shallow any water.    Do not apply perfume, powder, body lotions or deodorant on the day of surgery.    Do not wear any makeup, including " mascara and false eyelashes.    Nail polish should be removed.    Wear comfortable clothes, such as button front shirt and loose-fitting pants.    Leave all jewelry, including body piercings and valuables at home.    Hairpins and clasps must be removed before you enter the operating room.    You may wear glasses, dentures and hearing aids before and after surgery. They may need to be removed before going into the operating room. Contact lenses worn before surgery must be removed before entering the operating room. Please bring a case for your hearing aids, glasses and/or contacts.    If you have sleep apnea, please bring your CPAP machine.    If you have an implantable device, such as a pacemaker or AICD, please bring the device information card, if you have one.       If you have any questions or concerns, please don't hesitate to call.     Julie Haase RN  [facility] Nurse Navigator [phone]

## 2023-11-10 ENCOUNTER — TELEPHONE (OUTPATIENT)
Dept: CARDIOTHORACIC SURGERY | Facility: CLINIC | Age: 48
End: 2023-11-10
Payer: MEDICAID

## 2023-11-10 NOTE — ANESTHESIA PREPROCEDURE EVALUATION
[facility]  Anesthesia Pre-Operative Evaluation         Patient Name: Kendra Will  YOB: 1975  MRN: [id number]    SUBJECTIVE:     Pre-operative evaluation for Procedure(s) (LRB):  REPLACEMENT, AORTIC VALVE, USING ROSS PROCEDURE (N/A)  Replacement-valve-aortic (N/A)     11/10/2023    Kendra Will is a 48 y.o. 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 She has GARCIA due to her severe AS. Decision for Ross operation vs mechanical aortic valve replacement.     Patient now presents for the above procedure(s).    TTE 8/30/23  Left Ventricle: The left ventricle is normal in size. Ventricular mass is normal. Normal wall thickness. Normal wall motion. There is normal systolic function with a visually estimated ejection fraction of 55 - 70%. Biplane (2D) method of discs ejection fraction is 56%. Global longitudinal strain is -16.4%. There is normal diastolic function.    Left Atrium: Left atrium is mildly dilated.    Right Ventricle: Normal right ventricular cavity size. Systolic function is normal.    Right Atrium: Normal right atrial size.    Aortic Valve: The aortic valve is a bicuspid valve. There is severe aortic valve sclerosis. Severely calcified cusps. Severely restricted motion. There is severe stenosis. Aortic valve area by VTI is 0.68 cm². Aortic valve peak velocity is 4.62 m/s. Mean gradient is 49 mmHg. The dimensionless index is 0.19. There is mild aortic regurgitation.    Mitral Valve: The mitral valve is structurally normal. There is mild regurgitation.    Tricuspid Valve: The tricuspid valve is structurally normal. There is trace regurgitation.    Pulmonic Valve: The pulmonic valve is structurally normal. There is no stenosis. The estimated PA systolic pressure is 35 mmHg. There is trace paravalvular  regurgitation.    Aorta: Aortic root is normal in size. Ascending aorta is normal measuring 2.87 cm.    Pulmonary Artery: No pulmonary hypertension. The estimated pulmonary artery systolic pressure is 35 mmHg.    IVC/SVC: Normal venous pressure at 3 mmHg.    Pericardium: There is no pericardial effusion.            Prev airway: None documented.        Patient Active Problem List   Diagnosis    Vitamin D deficiency    Degenerative arthritis of cervical spine    Mood disorder    Primary osteoarthritis of both hands    Nuclear sclerosis of both eyes    Mood disorder in conditions classified elsewhere    Resistance to antiviral drug    Osteoarthritis of multiple joints    Gynecomastia, female    Anxiety    BMI 26.0-26.9,adult    Moderate persistent asthma without complication    Nonrheumatic aortic valve stenosis    Nonrheumatic aortic valve insufficiency    Nonrheumatic mitral valve regurgitation    Bicuspid aortic valve    Persistent left SVC (superior vena cava)    Dyspnea on exertion    Pre-op evaluation    Left atrial enlargement    Abnormal EKG       Review of patient's allergies indicates:   Allergen Reactions    Neurontin [gabapentin] Other (See Comments)     confusion    Soma [carisoprodol] Other (See Comments)     weakness    Symbicort [budesonide-formoterol] Other (See Comments)     Sore throat( did not get with advair)    Latex, natural rubber Rash    Levofloxacin Rash       Current Inpatient Medications:      Current Facility-Administered Medications on File Prior to Encounter   Medication Dose Route Frequency Provider Last Rate Last Admin    sodium chloride 0.9% flush 10 mL  10 mL Intravenous PRN Jose Wray MD         Current Outpatient Medications on File Prior to Encounter   Medication Sig Dispense Refill    albuterol (PROVENTIL/VENTOLIN HFA) 90 mcg/actuation inhaler Inhale 2 puffs into the lungs every 6 (six) hours as needed for Wheezing. Rescue 18 g 3    albuterol-ipratropium (DUO-NEB) 2.5  mg-0.5 mg/3 mL nebulizer solution Take 3 mLs by nebulization every 6 (six) hours while awake. Rescue 270 mL 2    cetirizine (ZYRTEC) 10 MG tablet Take 1 tablet (10 mg total) by mouth once daily. 30 tablet 11    cyclobenzaprine (FLEXERIL) 10 MG tablet Take 1 tablet (10 mg total) by mouth 3 (three) times daily as needed for Muscle spasms (muscle spasm). 90 tablet 3    elviteg-cob-emtri-tenof ALAFEN (GENVOYA) 764-977-142-10 mg Tab Take 1 tablet by mouth once daily. 30 tablet 1    ergocalciferol (VITAMIN D2) 50,000 unit Cap Take 1 capsule (50,000 Units total) by mouth every 7 days. 4 capsule 3    fluticasone propionate (FLONASE) 50 mcg/actuation nasal spray 1 spray (50 mcg total) by Each Nostril route once daily. 16 g 11    fluticasone-salmeterol 230-21 mcg/dose (ADVAIR HFA) 230-21 mcg/actuation HFAA inhaler Inhale 2 puffs into the lungs 2 (two) times daily. Controller 12 g 11    ibuprofen (ADVIL,MOTRIN) 800 MG tablet TAKE ONE TABLET BY MOUTH EVERY 6 HOURS AS NEEDED FOR PAIN 90 tablet 3    montelukast (SINGULAIR) 10 mg tablet Take 1 tablet (10 mg total) by mouth once daily. 30 tablet 3    risperiDONE (RISPERDAL) 3 MG Tab Take 1 tablet (3 mg total) by mouth 2 (two) times daily. 60 tablet 5       Past Surgical History:   Procedure Laterality Date    CERVICAL FUSION  5/13/2014    CORONARY ANGIOGRAPHY N/A 9/25/2023    Procedure: ANGIOGRAM, CORONARY ARTERY;  Surgeon: Micah Medel MD;  Location: [facility] [place] CATH LAB;  Service: Cardiology;  Laterality: N/A;    HAND SURGERY      LEFT HEART CATHETERIZATION Left 9/25/2023    Procedure: Left heart cath;  Surgeon: Micah Medel MD;  Location: [facility] [place] CATH LAB;  Service: Cardiology;  Laterality: Left;    NECK SURGERY         Social History     Socioeconomic History    Marital status:     Years of education: college   Tobacco Use    Smoking status: Never    Smokeless tobacco: Never   Substance and Sexual Activity    Alcohol use: No     Alcohol/week: 0.0 standard drinks of  alcohol    Drug use: Not Currently     Comment: pt. states her  was giving her drugs and she doesnt know about it    Sexual activity: Never       OBJECTIVE:     Vital Signs Range (Last 24H):         Significant Labs:  Lab Results   Component Value Date    WBC 5.46 11/08/2023    HGB 14.3 11/08/2023    HCT 43.7 11/08/2023     11/08/2023    CHOL 293 (H) 09/19/2023    TRIG 221 (H) 09/19/2023    HDL 61 09/19/2023    ALT 17 11/08/2023    AST 23 11/08/2023     11/08/2023    K 4.5 11/08/2023     11/08/2023    CREATININE 0.9 11/08/2023    BUN 19 11/08/2023    CO2 27 11/08/2023    TSH 0.891 11/21/2022    INR 1.0 11/08/2023    HGBA1C 5.2 04/06/2022       Diagnostic Studies: No relevant studies.    EKG:   Results for orders placed or performed during the hospital encounter of 11/08/23   EKG 12-lead    Collection Time: 11/08/23  8:33 AM    Narrative    Test Reason : I35.0,Q23.1,Z01.818,    Vent. Rate : 055 BPM     Atrial Rate : 055 BPM     P-R Int : 146 ms          QRS Dur : 086 ms      QT Int : 420 ms       P-R-T Axes : 016 -12 022 degrees     QTc Int : 401 ms    Sinus bradycardia  Minimal voltage criteria for LVH, may be normal variant ( R in aVL )  Nonspecific ST abnormality  Abnormal ECG  When compared with ECG of 25-SEP-2023 07:51,  No significant change was found  Confirmed by BIBIANA AVILA MD ([id number]) on 11/8/2023 9:08:19 AM    Referred By: AZEB CASTANON           Confirmed By:BIBIANA AVILA MD       2D ECHO:  TTE:  Results for orders placed or performed during the hospital encounter of 08/30/23   Echo Saline Bubble? No   Result Value Ref Range    BSA 1.55 m2    LVOT stroke volume 74.44 cm3    LVIDd 3.47 (A) 3.5 - 6.0 cm    LV Systolic Volume 24.51 mL    LV Systolic Volume Index 16.0 mL/m2    LVIDs 2.60 2.1 - 4.0 cm    LV Diastolic Volume 49.74 mL    LV Diastolic Volume Index 32.51 mL/m2    IVS 1.12 (A) 0.6 - 1.1 cm    LVOT diameter 2.12 cm    LVOT area 3.5 cm2    FS 25 (A) 28 - 44 %    Left  Ventricle Relative Wall Thickness 0.56 cm    Posterior Wall 0.97 0.6 - 1.1 cm    LV mass 108.84 g    LV Mass Index 71 g/m2    MV Peak E Zach 0.85 m/s    TDI LATERAL 0.07 m/s    TDI SEPTAL 0.05 m/s    E/E' ratio 14.17 m/s    MV Peak A Zach 0.76 m/s    TR Max Zach 2.82 m/s    E/A ratio 1.12     E wave deceleration time 217.87 msec    LV SEPTAL E/E' RATIO 17.00 m/s    LV LATERAL E/E' RATIO 12.14 m/s    LVOT peak zach 0.92 m/s    Left Ventricular Outflow Tract Mean Velocity 0.65 cm/s    Left Ventricular Outflow Tract Mean Gradient 1.93 mmHg    LA volume (mod) 39.40 cm3    LA Volume Index (Mod) 25.8 mL/m2    LA size 3.82 cm    Left Atrium Major Axis 4.60 cm    Left Atrium Minor Axis 4.81 cm    RVDD 2.53 cm    RIGHT VENTRICLE FREE WALL THICKNESS 0.35     Right ventricular length in diastole (apical 4-chamber view) 6.82 cm    RV mid diameter 2.61 cm    RV S' 10.88 cm/s    RA area 9.4 cm2    RA Major Axis 3.75 cm    RA Width 3.05 cm    Right Atrium Volume Systolic 20.00 mL    AV mean gradient 49 mmHg    AV peak gradient 85 mmHg    Ao peak zach 4.62 m/s    Ao .00 cm    LVOT peak VTI 21.10 cm    AV valve area 0.68 cm²    AV Velocity Ratio 0.20     AV index (prosthetic) 0.19     NELSY by Velocity Ratio 0.70 cm²    Triscuspid Valve Regurgitation Peak Gradient 32 mmHg    STJ 2.45 cm    Ascending aorta 2.87 cm    IVC diameter 0.28 cm    Mean e' 0.06 m/s    ZLVIDS -0.50     ZLVIDD -2.52     LA Volume Index 35.9 mL/m2    LA volume 54.97 cm3    LA WIDTH 3.6 cm    TASV 11.0 cm/s    TAPSE 1.50 cm    TV resting pulmonary artery pressure 35 mmHg    RV TB RVSP 6 mmHg    Est. RA pres 3 mmHg    Mcdonald's Biplane MOD Ejection Fraction 56 %    GLS 16.4 %    Narrative      Left Ventricle: The left ventricle is normal in size. Ventricular mass   is normal. Normal wall thickness. Normal wall motion. There is normal   systolic function with a visually estimated ejection fraction of 55 - 70%.   Biplane (2D) method of discs ejection fraction is  56%. Global longitudinal   strain is -16.4%. There is normal diastolic function.    Left Atrium: Left atrium is mildly dilated.    Right Ventricle: Normal right ventricular cavity size. Systolic   function is normal.    Right Atrium: Normal right atrial size.    Aortic Valve: The aortic valve is a bicuspid valve. There is severe   aortic valve sclerosis. Severely calcified cusps. Severely restricted   motion. There is severe stenosis. Aortic valve area by VTI is 0.68 cm².   Aortic valve peak velocity is 4.62 m/s. Mean gradient is 49 mmHg. The   dimensionless index is 0.19. There is mild aortic regurgitation.    Mitral Valve: The mitral valve is structurally normal. There is mild   regurgitation.    Tricuspid Valve: The tricuspid valve is structurally normal. There is   trace regurgitation.    Pulmonic Valve: The pulmonic valve is structurally normal. There is no   stenosis. The estimated PA systolic pressure is 35 mmHg. There is trace   paravalvular regurgitation.    Aorta: Aortic root is normal in size. Ascending aorta is normal   measuring 2.87 cm.    Pulmonary Artery: No pulmonary hypertension. The estimated pulmonary   artery systolic pressure is 35 mmHg.    IVC/SVC: Normal venous pressure at 3 mmHg.    Pericardium: There is no pericardial effusion.         MONCHO:  No results found for this or any previous visit.    ASSESSMENT/PLAN:         Pre-op Assessment    I have reviewed the Patient Summary Reports.     I have reviewed the Nursing Notes. I have reviewed the NPO Status.   I have reviewed the Medications.     Review of Systems  Anesthesia Hx:             Denies Family Hx of Anesthesia complications.    Denies Personal Hx of Anesthesia complications.                    Hematology/Oncology:  Hematology Normal   Oncology Normal                                   EENT/Dental:  EENT/Dental Normal           Cardiovascular:      Valvular problems/Murmurs, AS                                       Pulmonary:    Asthma                     Renal/:  Renal/ Normal                 Hepatic/GI:  Hepatic/GI Normal                 Musculoskeletal:  Arthritis               Neurological:   CVA                                    Endocrine:  Endocrine Normal            Dermatological:  Skin Normal    Psych:  Psychiatric History  depression                Physical Exam  General: Well nourished, Cooperative and Alert    Airway:  Mallampati: II   Mouth Opening: Normal  TM Distance: Normal  Tongue: Normal  Neck ROM: Normal ROM    Dental:  Intact        Anesthesia Plan  Type of Anesthesia, risks & benefits discussed:    Anesthesia Type: Gen ETT  Intra-op Monitoring Plan: Standard ASA Monitors, Art Line, Central Line and MONCHO  Post Op Pain Control Plan: multimodal analgesia, IV/PO Opioids PRN and peripheral nerve block  Induction:  IV  Airway Plan: Video, Post-Induction  Informed Consent: Informed consent signed with the Patient and all parties understand the risks and agree with anesthesia plan.  All questions answered. Patient consented to blood products? Yes  ASA Score: 4  Day of Surgery Review of History & Physical: H&P Update referred to the surgeon/provider.    Ready For Surgery From Anesthesia Perspective.     .

## 2023-11-10 NOTE — TELEPHONE ENCOUNTER
Pt informed of arrival time for surgery.  Pt instructed to report to [facility] at 5:00 Monday morning.  Pt reminded to perform Hibiclens shower Sunday night and Monday morning, and to become NPO at midnight.  Pt verbalized understanding.     Julie Haase RN  CTS Nurse Navigator [phone]

## 2023-11-13 ENCOUNTER — ANESTHESIA (OUTPATIENT)
Dept: SURGERY | Facility: HOSPITAL | Age: 48
DRG: 220 | End: 2023-11-13
Payer: MEDICAID

## 2023-11-13 ENCOUNTER — TELEPHONE (OUTPATIENT)
Dept: CARDIAC REHAB | Facility: CLINIC | Age: 48
End: 2023-11-13
Payer: MEDICAID

## 2023-11-13 ENCOUNTER — HOSPITAL ENCOUNTER (INPATIENT)
Facility: HOSPITAL | Age: 48
LOS: 6 days | Discharge: HOME | DRG: 220 | End: 2023-11-19
Attending: THORACIC SURGERY (CARDIOTHORACIC VASCULAR SURGERY) | Admitting: THORACIC SURGERY (CARDIOTHORACIC VASCULAR SURGERY)
Payer: MEDICAID

## 2023-11-13 DIAGNOSIS — Z98.890 HISTORY OF CARDIOVASCULAR SURGERY: ICD-10-CM

## 2023-11-13 DIAGNOSIS — I35.0 NONRHEUMATIC AORTIC VALVE STENOSIS: ICD-10-CM

## 2023-11-13 DIAGNOSIS — D62 ACUTE BLOOD LOSS ANEMIA: ICD-10-CM

## 2023-11-13 DIAGNOSIS — Z16.33 RESISTANCE TO ANTIVIRAL DRUG: ICD-10-CM

## 2023-11-13 DIAGNOSIS — Z98.890 POST-OPERATIVE STATE: Primary | ICD-10-CM

## 2023-11-13 DIAGNOSIS — R73.9 TRANSIENT HYPERGLYCEMIA POST PROCEDURE: ICD-10-CM

## 2023-11-13 DIAGNOSIS — R00.0 TACHYCARDIA: ICD-10-CM

## 2023-11-13 DIAGNOSIS — Z95.2 AORTIC VALVE REPLACED: ICD-10-CM

## 2023-11-13 DIAGNOSIS — Z95.2 S/P AORTIC VALVE REPLACEMENT: Primary | ICD-10-CM

## 2023-11-13 DIAGNOSIS — I35.0 AORTIC STENOSIS: ICD-10-CM

## 2023-11-13 DIAGNOSIS — J45.909 ASTHMA, UNSPECIFIED ASTHMA SEVERITY, UNSPECIFIED WHETHER COMPLICATED, UNSPECIFIED WHETHER PERSISTENT: ICD-10-CM

## 2023-11-13 LAB
ALBUMIN SERPL BCP-MCNC: 2.5 G/DL (ref 3.5–5.2)
ALLENS TEST: ABNORMAL
ALP SERPL-CCNC: 47 U/L (ref 55–135)
ALT SERPL W/O P-5'-P-CCNC: 24 U/L (ref 10–44)
ANION GAP SERPL CALC-SCNC: 11 MMOL/L (ref 8–16)
APTT PPP: 31.3 SEC (ref 21–32)
AST SERPL-CCNC: 148 U/L (ref 10–40)
BILIRUB SERPL-MCNC: 1.3 MG/DL (ref 0.1–1)
BLD PROD TYP BPU: NORMAL
BLOOD UNIT EXPIRATION DATE: NORMAL
BLOOD UNIT TYPE CODE: 6200
BLOOD UNIT TYPE: NORMAL
BUN SERPL-MCNC: 18 MG/DL (ref 6–20)
CALCIUM SERPL-MCNC: 9.1 MG/DL (ref 8.7–10.5)
CHLORIDE SERPL-SCNC: 112 MMOL/L (ref 95–110)
CO2 SERPL-SCNC: 20 MMOL/L (ref 23–29)
CODING SYSTEM: NORMAL
CREAT SERPL-MCNC: 0.9 MG/DL (ref 0.5–1.4)
CROSSMATCH INTERPRETATION: NORMAL
DELSYS: ABNORMAL
DISPENSE STATUS: NORMAL
ERYTHROCYTE [DISTWIDTH] IN BLOOD BY AUTOMATED COUNT: 13.8 % (ref 11.5–14.5)
ERYTHROCYTE [SEDIMENTATION RATE] IN BLOOD BY WESTERGREN METHOD: 18 MM/H
ERYTHROCYTE [SEDIMENTATION RATE] IN BLOOD BY WESTERGREN METHOD: 18 MM/H
ERYTHROCYTE [SEDIMENTATION RATE] IN BLOOD BY WESTERGREN METHOD: 22 MM/H
ERYTHROCYTE [SEDIMENTATION RATE] IN BLOOD BY WESTERGREN METHOD: 22 MM/H
ERYTHROCYTE [SEDIMENTATION RATE] IN BLOOD BY WESTERGREN METHOD: 23 MM/H
ERYTHROCYTE [SEDIMENTATION RATE] IN BLOOD BY WESTERGREN METHOD: 23 MM/H
ERYTHROCYTE [SEDIMENTATION RATE] IN BLOOD BY WESTERGREN METHOD: 28 MM/H
ERYTHROCYTE [SEDIMENTATION RATE] IN BLOOD BY WESTERGREN METHOD: 28 MM/H
EST. GFR  (NO RACE VARIABLE): >60 ML/MIN/1.73 M^2
FIBRINOGEN PPP-MCNC: 183 MG/DL (ref 182–400)
FIO2: 100
FIO2: 100
FIO2: 50
FIO2: 70
FIO2: 70
GLUCOSE SERPL-MCNC: 144 MG/DL (ref 70–110)
GLUCOSE SERPL-MCNC: 144 MG/DL (ref 70–110)
GLUCOSE SERPL-MCNC: 153 MG/DL (ref 70–110)
GLUCOSE SERPL-MCNC: 161 MG/DL (ref 70–110)
GLUCOSE SERPL-MCNC: 162 MG/DL (ref 70–110)
GLUCOSE SERPL-MCNC: 168 MG/DL (ref 70–110)
GLUCOSE SERPL-MCNC: 175 MG/DL (ref 70–110)
GLUCOSE SERPL-MCNC: 182 MG/DL (ref 70–110)
GLUCOSE SERPL-MCNC: 195 MG/DL (ref 70–110)
GLUCOSE SERPL-MCNC: 197 MG/DL (ref 70–110)
GLUCOSE SERPL-MCNC: 207 MG/DL (ref 70–110)
GLUCOSE SERPL-MCNC: 210 MG/DL (ref 70–110)
HCO3 UR-SCNC: 15 MMOL/L (ref 24–28)
HCO3 UR-SCNC: 15.5 MMOL/L (ref 24–28)
HCO3 UR-SCNC: 15.8 MMOL/L (ref 24–28)
HCO3 UR-SCNC: 16 MMOL/L (ref 24–28)
HCO3 UR-SCNC: 16.4 MMOL/L (ref 24–28)
HCO3 UR-SCNC: 16.4 MMOL/L (ref 24–28)
HCO3 UR-SCNC: 17.2 MMOL/L (ref 24–28)
HCO3 UR-SCNC: 18.9 MMOL/L (ref 24–28)
HCO3 UR-SCNC: 19.9 MMOL/L (ref 24–28)
HCO3 UR-SCNC: 20.4 MMOL/L (ref 24–28)
HCO3 UR-SCNC: 21.8 MMOL/L (ref 24–28)
HCO3 UR-SCNC: 22.7 MMOL/L (ref 24–28)
HCO3 UR-SCNC: 22.7 MMOL/L (ref 24–28)
HCO3 UR-SCNC: 22.9 MMOL/L (ref 24–28)
HCO3 UR-SCNC: 23.2 MMOL/L (ref 24–28)
HCO3 UR-SCNC: 23.2 MMOL/L (ref 24–28)
HCO3 UR-SCNC: 23.3 MMOL/L (ref 24–28)
HCO3 UR-SCNC: 23.8 MMOL/L (ref 24–28)
HCO3 UR-SCNC: 23.9 MMOL/L (ref 24–28)
HCO3 UR-SCNC: 28.7 MMOL/L (ref 24–28)
HCT VFR BLD AUTO: 30.1 % (ref 37–48.5)
HCT VFR BLD CALC: 20 %PCV (ref 36–54)
HCT VFR BLD CALC: 21 %PCV (ref 36–54)
HCT VFR BLD CALC: 22 %PCV (ref 36–54)
HCT VFR BLD CALC: 22 %PCV (ref 36–54)
HCT VFR BLD CALC: 23 %PCV (ref 36–54)
HCT VFR BLD CALC: 24 %PCV (ref 36–54)
HCT VFR BLD CALC: 25 %PCV (ref 36–54)
HCT VFR BLD CALC: 26 %PCV (ref 36–54)
HCT VFR BLD CALC: 26 %PCV (ref 36–54)
HCT VFR BLD CALC: 28 %PCV (ref 36–54)
HCT VFR BLD CALC: 28 %PCV (ref 36–54)
HCT VFR BLD CALC: 29 %PCV (ref 36–54)
HCT VFR BLD CALC: 31 %PCV (ref 36–54)
HCT VFR BLD CALC: 32 %PCV (ref 36–54)
HCT VFR BLD CALC: 33 %PCV (ref 36–54)
HGB BLD-MCNC: 10.3 G/DL (ref 12–16)
INR PPP: 1.1 (ref 0.8–1.2)
LDH SERPL L TO P-CCNC: 0.44 MMOL/L (ref 0.5–2.2)
LDH SERPL L TO P-CCNC: 2.56 MMOL/L (ref 0.36–1.25)
LDH SERPL L TO P-CCNC: 4.05 MMOL/L (ref 0.36–1.25)
LDH SERPL L TO P-CCNC: 4.23 MMOL/L (ref 0.36–1.25)
LDH SERPL L TO P-CCNC: 4.23 MMOL/L (ref 0.36–1.25)
LDH SERPL L TO P-CCNC: 4.51 MMOL/L (ref 0.36–1.25)
LDH SERPL L TO P-CCNC: 4.81 MMOL/L (ref 0.36–1.25)
LDH SERPL L TO P-CCNC: 5.21 MMOL/L (ref 0.36–1.25)
LDH SERPL L TO P-CCNC: 5.48 MMOL/L (ref 0.36–1.25)
LDH SERPL L TO P-CCNC: 5.74 MMOL/L (ref 0.36–1.25)
LDH SERPL L TO P-CCNC: 5.84 MMOL/L (ref 0.36–1.25)
LDH SERPL L TO P-CCNC: 5.96 MMOL/L (ref 0.36–1.25)
LDH SERPL L TO P-CCNC: 6.41 MMOL/L (ref 0.36–1.25)
LDH SERPL L TO P-CCNC: <0.3 MMOL/L (ref 0.36–1.25)
MAGNESIUM SERPL-MCNC: 3.2 MG/DL (ref 1.6–2.6)
MCH RBC QN AUTO: 31.2 PG (ref 27–31)
MCHC RBC AUTO-ENTMCNC: 34.2 G/DL (ref 32–36)
MCV RBC AUTO: 91 FL (ref 82–98)
MIN VOL: 10.2
MIN VOL: 10.2
MIN VOL: 8.14
MIN VOL: 8.14
MIN VOL: 8.28
MIN VOL: 8.28
MIN VOL: 8.34
MIN VOL: 8.37
MODE: ABNORMAL
NUM UNITS TRANS FFP: NORMAL
NUM UNITS TRANS FFP: NORMAL
NUM UNITS TRANS PACKED RBC: NORMAL
NUM UNITS TRANS PACKED RBC: NORMAL
PCO2 BLDA: 27.8 MMHG (ref 35–45)
PCO2 BLDA: 28.4 MMHG (ref 35–45)
PCO2 BLDA: 29.4 MMHG (ref 35–45)
PCO2 BLDA: 30.6 MMHG (ref 35–45)
PCO2 BLDA: 30.7 MMHG (ref 35–45)
PCO2 BLDA: 31 MMHG (ref 35–45)
PCO2 BLDA: 32.8 MMHG (ref 35–45)
PCO2 BLDA: 33.9 MMHG (ref 35–45)
PCO2 BLDA: 34.7 MMHG (ref 35–45)
PCO2 BLDA: 35.9 MMHG (ref 35–45)
PCO2 BLDA: 37 MMHG (ref 35–45)
PCO2 BLDA: 37.2 MMHG (ref 35–45)
PCO2 BLDA: 38.2 MMHG (ref 35–45)
PCO2 BLDA: 38.2 MMHG (ref 35–45)
PCO2 BLDA: 38.4 MMHG (ref 35–45)
PCO2 BLDA: 39.5 MMHG (ref 35–45)
PCO2 BLDA: 40.1 MMHG (ref 35–45)
PCO2 BLDA: 40.2 MMHG (ref 35–45)
PCO2 BLDA: 43.5 MMHG (ref 35–45)
PCO2 BLDA: 97 MMHG (ref 35–45)
PEEP: 5
PH SMN: 7.08 [PH] (ref 7.35–7.45)
PH SMN: 7.29 [PH] (ref 7.35–7.45)
PH SMN: 7.3 [PH] (ref 7.35–7.45)
PH SMN: 7.31 [PH] (ref 7.35–7.45)
PH SMN: 7.31 [PH] (ref 7.35–7.45)
PH SMN: 7.32 [PH] (ref 7.35–7.45)
PH SMN: 7.33 [PH] (ref 7.35–7.45)
PH SMN: 7.34 [PH] (ref 7.35–7.45)
PH SMN: 7.34 [PH] (ref 7.35–7.45)
PH SMN: 7.35 [PH] (ref 7.35–7.45)
PH SMN: 7.37 [PH] (ref 7.35–7.45)
PH SMN: 7.37 [PH] (ref 7.35–7.45)
PH SMN: 7.38 [PH] (ref 7.35–7.45)
PH SMN: 7.38 [PH] (ref 7.35–7.45)
PH SMN: 7.39 [PH] (ref 7.35–7.45)
PH SMN: 7.4 [PH] (ref 7.35–7.45)
PH SMN: 7.4 [PH] (ref 7.35–7.45)
PH SMN: 7.41 [PH] (ref 7.35–7.45)
PH SMN: 7.42 [PH] (ref 7.35–7.45)
PH SMN: 7.43 [PH] (ref 7.35–7.45)
PHOSPHATE SERPL-MCNC: 2.9 MG/DL (ref 2.7–4.5)
PIP: 22
PIP: 24
PLATELET # BLD AUTO: 159 K/UL (ref 150–450)
PLATELET BLD QL SMEAR: NORMAL
PMV BLD AUTO: 10.1 FL (ref 9.2–12.9)
PO2 BLDA: 112 MMHG (ref 80–100)
PO2 BLDA: 117 MMHG (ref 80–100)
PO2 BLDA: 123 MMHG (ref 80–100)
PO2 BLDA: 131 MMHG (ref 80–100)
PO2 BLDA: 176 MMHG (ref 80–100)
PO2 BLDA: 180 MMHG (ref 80–100)
PO2 BLDA: 183 MMHG (ref 80–100)
PO2 BLDA: 184 MMHG (ref 80–100)
PO2 BLDA: 192 MMHG (ref 80–100)
PO2 BLDA: 251 MMHG (ref 80–100)
PO2 BLDA: 252 MMHG (ref 80–100)
PO2 BLDA: 258 MMHG (ref 80–100)
PO2 BLDA: 265 MMHG (ref 80–100)
PO2 BLDA: 265 MMHG (ref 80–100)
PO2 BLDA: 271 MMHG (ref 80–100)
PO2 BLDA: 286 MMHG (ref 80–100)
PO2 BLDA: 369 MMHG (ref 80–100)
PO2 BLDA: 46 MMHG (ref 40–60)
PO2 BLDA: 699 MMHG (ref 80–100)
PO2 BLDA: 71 MMHG (ref 40–60)
POC BE: -1 MMOL/L
POC BE: -10 MMOL/L
POC BE: -11 MMOL/L
POC BE: -2 MMOL/L
POC BE: -3 MMOL/L
POC BE: -4 MMOL/L
POC BE: -5 MMOL/L
POC BE: -6 MMOL/L
POC BE: -7 MMOL/L
POC BE: -8 MMOL/L
POC BE: -9 MMOL/L
POC BE: -9 MMOL/L
POC IONIZED CALCIUM: 0.74 MMOL/L (ref 1.06–1.42)
POC IONIZED CALCIUM: 0.8 MMOL/L (ref 1.06–1.42)
POC IONIZED CALCIUM: 0.97 MMOL/L (ref 1.06–1.42)
POC IONIZED CALCIUM: 0.98 MMOL/L (ref 1.06–1.42)
POC IONIZED CALCIUM: 1.01 MMOL/L (ref 1.06–1.42)
POC IONIZED CALCIUM: 1.02 MMOL/L (ref 1.06–1.42)
POC IONIZED CALCIUM: 1.04 MMOL/L (ref 1.06–1.42)
POC IONIZED CALCIUM: 1.05 MMOL/L (ref 1.06–1.42)
POC IONIZED CALCIUM: 1.14 MMOL/L (ref 1.06–1.42)
POC IONIZED CALCIUM: 1.22 MMOL/L (ref 1.06–1.42)
POC IONIZED CALCIUM: 1.23 MMOL/L (ref 1.06–1.42)
POC IONIZED CALCIUM: 1.25 MMOL/L (ref 1.06–1.42)
POC IONIZED CALCIUM: 1.28 MMOL/L (ref 1.06–1.42)
POC IONIZED CALCIUM: 1.28 MMOL/L (ref 1.06–1.42)
POC IONIZED CALCIUM: 1.29 MMOL/L (ref 1.06–1.42)
POC IONIZED CALCIUM: 1.3 MMOL/L (ref 1.06–1.42)
POC IONIZED CALCIUM: 1.31 MMOL/L (ref 1.06–1.42)
POC IONIZED CALCIUM: 1.31 MMOL/L (ref 1.06–1.42)
POC SATURATED O2: 100 % (ref 95–100)
POC SATURATED O2: 82 % (ref 95–100)
POC SATURATED O2: 84 % (ref 95–100)
POC SATURATED O2: 98 % (ref 95–100)
POC SATURATED O2: 99 % (ref 95–100)
POC TCO2: 16 MMOL/L (ref 23–27)
POC TCO2: 16 MMOL/L (ref 23–27)
POC TCO2: 17 MMOL/L (ref 23–27)
POC TCO2: 18 MMOL/L (ref 23–27)
POC TCO2: 20 MMOL/L (ref 23–27)
POC TCO2: 21 MMOL/L (ref 23–27)
POC TCO2: 22 MMOL/L (ref 23–27)
POC TCO2: 23 MMOL/L (ref 23–27)
POC TCO2: 24 MMOL/L (ref 23–27)
POC TCO2: 25 MMOL/L (ref 23–27)
POC TCO2: 25 MMOL/L (ref 24–29)
POC TCO2: 32 MMOL/L (ref 24–29)
POCT GLUCOSE: 165 MG/DL (ref 70–110)
POCT GLUCOSE: 177 MG/DL (ref 70–110)
POCT GLUCOSE: 187 MG/DL (ref 70–110)
POCT GLUCOSE: 190 MG/DL (ref 70–110)
POCT GLUCOSE: 191 MG/DL (ref 70–110)
POCT GLUCOSE: 192 MG/DL (ref 70–110)
POCT GLUCOSE: 195 MG/DL (ref 70–110)
POCT GLUCOSE: 201 MG/DL (ref 70–110)
POCT GLUCOSE: 202 MG/DL (ref 70–110)
POTASSIUM BLD-SCNC: 3.3 MMOL/L (ref 3.5–5.1)
POTASSIUM BLD-SCNC: 3.4 MMOL/L (ref 3.5–5.1)
POTASSIUM BLD-SCNC: 3.6 MMOL/L (ref 3.5–5.1)
POTASSIUM BLD-SCNC: 3.7 MMOL/L (ref 3.5–5.1)
POTASSIUM BLD-SCNC: 3.7 MMOL/L (ref 3.5–5.1)
POTASSIUM BLD-SCNC: 3.8 MMOL/L (ref 3.5–5.1)
POTASSIUM BLD-SCNC: 3.8 MMOL/L (ref 3.5–5.1)
POTASSIUM BLD-SCNC: 3.9 MMOL/L (ref 3.5–5.1)
POTASSIUM BLD-SCNC: 3.9 MMOL/L (ref 3.5–5.1)
POTASSIUM BLD-SCNC: 4 MMOL/L (ref 3.5–5.1)
POTASSIUM BLD-SCNC: 4 MMOL/L (ref 3.5–5.1)
POTASSIUM BLD-SCNC: 4.1 MMOL/L (ref 3.5–5.1)
POTASSIUM BLD-SCNC: 4.1 MMOL/L (ref 3.5–5.1)
POTASSIUM BLD-SCNC: 4.2 MMOL/L (ref 3.5–5.1)
POTASSIUM BLD-SCNC: 4.4 MMOL/L (ref 3.5–5.1)
POTASSIUM BLD-SCNC: 4.9 MMOL/L (ref 3.5–5.1)
POTASSIUM BLD-SCNC: 4.9 MMOL/L (ref 3.5–5.1)
POTASSIUM BLD-SCNC: 5 MMOL/L (ref 3.5–5.1)
POTASSIUM BLD-SCNC: 5.7 MMOL/L (ref 3.5–5.1)
POTASSIUM BLD-SCNC: 5.8 MMOL/L (ref 3.5–5.1)
POTASSIUM SERPL-SCNC: 4 MMOL/L (ref 3.5–5.1)
POTASSIUM SERPL-SCNC: 4 MMOL/L (ref 3.5–5.1)
PROT SERPL-MCNC: 4.6 G/DL (ref 6–8.4)
PROTHROMBIN TIME: 12.1 SEC (ref 9–12.5)
RBC # BLD AUTO: 3.3 M/UL (ref 4–5.4)
SAMPLE: ABNORMAL
SITE: ABNORMAL
SODIUM BLD-SCNC: 138 MMOL/L (ref 136–145)
SODIUM BLD-SCNC: 139 MMOL/L (ref 136–145)
SODIUM BLD-SCNC: 140 MMOL/L (ref 136–145)
SODIUM BLD-SCNC: 141 MMOL/L (ref 136–145)
SODIUM BLD-SCNC: 141 MMOL/L (ref 136–145)
SODIUM BLD-SCNC: 142 MMOL/L (ref 136–145)
SODIUM BLD-SCNC: 142 MMOL/L (ref 136–145)
SODIUM BLD-SCNC: 143 MMOL/L (ref 136–145)
SODIUM BLD-SCNC: 143 MMOL/L (ref 136–145)
SODIUM BLD-SCNC: 144 MMOL/L (ref 136–145)
SODIUM BLD-SCNC: 145 MMOL/L (ref 136–145)
SODIUM BLD-SCNC: 145 MMOL/L (ref 136–145)
SODIUM BLD-SCNC: 146 MMOL/L (ref 136–145)
SODIUM SERPL-SCNC: 143 MMOL/L (ref 136–145)
SP02: 100
UNIT NUMBER: NORMAL
VT: 350
VT: 350
VT: 360
VT: 361
VT: 361
VT: 362
VT: 368
VT: 368
WBC # BLD AUTO: 10.55 K/UL (ref 3.9–12.7)

## 2023-11-13 PROCEDURE — 93320 PR DOPPLER ECHO HEART,COMPLETE: ICD-10-PCS | Mod: 26,,, | Performed by: ANESTHESIOLOGY

## 2023-11-13 PROCEDURE — C9248 INJ, CLEVIDIPINE BUTYRATE: HCPCS | Mod: JZ,JG

## 2023-11-13 PROCEDURE — 82330 ASSAY OF CALCIUM: CPT

## 2023-11-13 PROCEDURE — 84295 ASSAY OF SERUM SODIUM: CPT

## 2023-11-13 PROCEDURE — 25000003 PHARM REV CODE 250: Performed by: STUDENT IN AN ORGANIZED HEALTH CARE EDUCATION/TRAINING PROGRAM

## 2023-11-13 PROCEDURE — 63600175 PHARM REV CODE 636 W HCPCS: Performed by: STUDENT IN AN ORGANIZED HEALTH CARE EDUCATION/TRAINING PROGRAM

## 2023-11-13 PROCEDURE — 85610 PROTHROMBIN TIME: CPT | Performed by: STUDENT IN AN ORGANIZED HEALTH CARE EDUCATION/TRAINING PROGRAM

## 2023-11-13 PROCEDURE — 82800 BLOOD PH: CPT

## 2023-11-13 PROCEDURE — 27100171 HC OXYGEN HIGH FLOW UP TO 24 HOURS

## 2023-11-13 PROCEDURE — 37799 UNLISTED PX VASCULAR SURGERY: CPT

## 2023-11-13 PROCEDURE — 85027 COMPLETE CBC AUTOMATED: CPT | Performed by: STUDENT IN AN ORGANIZED HEALTH CARE EDUCATION/TRAINING PROGRAM

## 2023-11-13 PROCEDURE — P9017 PLASMA 1 DONOR FRZ W/IN 8 HR: HCPCS | Performed by: STUDENT IN AN ORGANIZED HEALTH CARE EDUCATION/TRAINING PROGRAM

## 2023-11-13 PROCEDURE — D9220A PRA ANESTHESIA: ICD-10-PCS | Mod: ,,, | Performed by: ANESTHESIOLOGY

## 2023-11-13 PROCEDURE — 27201423 OPTIME MED/SURG SUP & DEVICES STERILE SUPPLY: Performed by: THORACIC SURGERY (CARDIOTHORACIC VASCULAR SURGERY)

## 2023-11-13 PROCEDURE — 94761 N-INVAS EAR/PLS OXIMETRY MLT: CPT | Mod: XB

## 2023-11-13 PROCEDURE — L8670 VASCULAR GRAFT, SYNTHETIC: HCPCS | Performed by: THORACIC SURGERY (CARDIOTHORACIC VASCULAR SURGERY)

## 2023-11-13 PROCEDURE — 83605 ASSAY OF LACTIC ACID: CPT

## 2023-11-13 PROCEDURE — 80053 COMPREHEN METABOLIC PANEL: CPT | Performed by: STUDENT IN AN ORGANIZED HEALTH CARE EDUCATION/TRAINING PROGRAM

## 2023-11-13 PROCEDURE — 93320 DOPPLER ECHO COMPLETE: CPT | Mod: 26,,, | Performed by: ANESTHESIOLOGY

## 2023-11-13 PROCEDURE — 93325 PR DOPPLER COLOR FLOW VELOCITY MAP: ICD-10-PCS | Mod: 26,,, | Performed by: ANESTHESIOLOGY

## 2023-11-13 PROCEDURE — 84132 ASSAY OF SERUM POTASSIUM: CPT

## 2023-11-13 PROCEDURE — 99900035 HC TECH TIME PER 15 MIN (STAT)

## 2023-11-13 PROCEDURE — 33864 PR ASCEND AORTA GRAFT W VALVE SPARING ANNULUS REMODEL: ICD-10-PCS | Mod: ,,, | Performed by: THORACIC SURGERY (CARDIOTHORACIC VASCULAR SURGERY)

## 2023-11-13 PROCEDURE — 88305 TISSUE EXAM BY PATHOLOGIST: ICD-10-PCS | Mod: 26,,, | Performed by: PATHOLOGY

## 2023-11-13 PROCEDURE — 33641 PR REASD W BYPASS: ICD-10-PCS | Mod: 51,,, | Performed by: THORACIC SURGERY (CARDIOTHORACIC VASCULAR SURGERY)

## 2023-11-13 PROCEDURE — P9035 PLATELET PHERES LEUKOREDUCED: HCPCS | Performed by: THORACIC SURGERY (CARDIOTHORACIC VASCULAR SURGERY)

## 2023-11-13 PROCEDURE — 27100088 HC CELL SAVER

## 2023-11-13 PROCEDURE — 37000009 HC ANESTHESIA EA ADD 15 MINS: Performed by: THORACIC SURGERY (CARDIOTHORACIC VASCULAR SURGERY)

## 2023-11-13 PROCEDURE — 33641 REPAIR HEART SEPTUM DEFECT: CPT | Mod: 51,,, | Performed by: THORACIC SURGERY (CARDIOTHORACIC VASCULAR SURGERY)

## 2023-11-13 PROCEDURE — 85384 FIBRINOGEN ACTIVITY: CPT | Performed by: STUDENT IN AN ORGANIZED HEALTH CARE EDUCATION/TRAINING PROGRAM

## 2023-11-13 PROCEDURE — 63600175 PHARM REV CODE 636 W HCPCS: Performed by: THORACIC SURGERY (CARDIOTHORACIC VASCULAR SURGERY)

## 2023-11-13 PROCEDURE — 93312 TEE: ICD-10-PCS | Mod: 26,59,, | Performed by: ANESTHESIOLOGY

## 2023-11-13 PROCEDURE — P9016 RBC LEUKOCYTES REDUCED: HCPCS | Performed by: STUDENT IN AN ORGANIZED HEALTH CARE EDUCATION/TRAINING PROGRAM

## 2023-11-13 PROCEDURE — 27201037 HC PRESSURE MONITORING SET UP

## 2023-11-13 PROCEDURE — 27100026 HC SHUNT SENSOR, TERUMO

## 2023-11-13 PROCEDURE — 82803 BLOOD GASES ANY COMBINATION: CPT

## 2023-11-13 PROCEDURE — 85730 THROMBOPLASTIN TIME PARTIAL: CPT | Performed by: STUDENT IN AN ORGANIZED HEALTH CARE EDUCATION/TRAINING PROGRAM

## 2023-11-13 PROCEDURE — 25000003 PHARM REV CODE 250: Performed by: THORACIC SURGERY (CARDIOTHORACIC VASCULAR SURGERY)

## 2023-11-13 PROCEDURE — 93325 DOPPLER ECHO COLOR FLOW MAPG: CPT | Mod: 26,,, | Performed by: ANESTHESIOLOGY

## 2023-11-13 PROCEDURE — 82565 ASSAY OF CREATININE: CPT

## 2023-11-13 PROCEDURE — 27000445 HC TEMPORARY PACEMAKER LEADS

## 2023-11-13 PROCEDURE — 99291 PR CRITICAL CARE, E/M 30-74 MINUTES: ICD-10-PCS | Mod: ,,, | Performed by: ANESTHESIOLOGY

## 2023-11-13 PROCEDURE — 36620 L RADIAL ARTERIAL LINE: ICD-10-PCS | Mod: 59,,, | Performed by: ANESTHESIOLOGY

## 2023-11-13 PROCEDURE — 27000175 HC ADULT BYPASS PUMP

## 2023-11-13 PROCEDURE — 85520 HEPARIN ASSAY: CPT

## 2023-11-13 PROCEDURE — 83735 ASSAY OF MAGNESIUM: CPT | Performed by: STUDENT IN AN ORGANIZED HEALTH CARE EDUCATION/TRAINING PROGRAM

## 2023-11-13 PROCEDURE — 63600175 PHARM REV CODE 636 W HCPCS: Mod: JZ,JG

## 2023-11-13 PROCEDURE — 99223 PR INITIAL HOSPITAL CARE,LEVL III: ICD-10-PCS | Mod: ,,, | Performed by: NURSE PRACTITIONER

## 2023-11-13 PROCEDURE — 63600175 PHARM REV CODE 636 W HCPCS: Performed by: ANESTHESIOLOGY

## 2023-11-13 PROCEDURE — 36592 COLLECT BLOOD FROM PICC: CPT

## 2023-11-13 PROCEDURE — 36000713 HC OR TIME LEV V EA ADD 15 MIN: Performed by: THORACIC SURGERY (CARDIOTHORACIC VASCULAR SURGERY)

## 2023-11-13 PROCEDURE — 37000008 HC ANESTHESIA 1ST 15 MINUTES: Performed by: THORACIC SURGERY (CARDIOTHORACIC VASCULAR SURGERY)

## 2023-11-13 PROCEDURE — 27202608 HC CANNULA, MISC

## 2023-11-13 PROCEDURE — 27000191 HC C-V MONITORING

## 2023-11-13 PROCEDURE — P9045 ALBUMIN (HUMAN), 5%, 250 ML: HCPCS | Mod: JZ,JG

## 2023-11-13 PROCEDURE — 93312 ECHO TRANSESOPHAGEAL: CPT | Mod: 26,59,, | Performed by: ANESTHESIOLOGY

## 2023-11-13 PROCEDURE — 25000003 PHARM REV CODE 250

## 2023-11-13 PROCEDURE — 99223 1ST HOSP IP/OBS HIGH 75: CPT | Mod: ,,, | Performed by: NURSE PRACTITIONER

## 2023-11-13 PROCEDURE — 88305 TISSUE EXAM BY PATHOLOGIST: CPT | Mod: 26,,, | Performed by: PATHOLOGY

## 2023-11-13 PROCEDURE — 36556 INSERT NON-TUNNEL CV CATH: CPT | Mod: 59,,, | Performed by: ANESTHESIOLOGY

## 2023-11-13 PROCEDURE — 94003 VENT MGMT INPAT SUBQ DAY: CPT

## 2023-11-13 PROCEDURE — 63600175 PHARM REV CODE 636 W HCPCS

## 2023-11-13 PROCEDURE — 99900026 HC AIRWAY MAINTENANCE (STAT)

## 2023-11-13 PROCEDURE — 85014 HEMATOCRIT: CPT

## 2023-11-13 PROCEDURE — 36000712 HC OR TIME LEV V 1ST 15 MIN: Performed by: THORACIC SURGERY (CARDIOTHORACIC VASCULAR SURGERY)

## 2023-11-13 PROCEDURE — 36556: ICD-10-PCS | Mod: 59,,, | Performed by: ANESTHESIOLOGY

## 2023-11-13 PROCEDURE — 86920 COMPATIBILITY TEST SPIN: CPT | Performed by: THORACIC SURGERY (CARDIOTHORACIC VASCULAR SURGERY)

## 2023-11-13 PROCEDURE — 36620 INSERTION CATHETER ARTERY: CPT | Mod: 59,,, | Performed by: ANESTHESIOLOGY

## 2023-11-13 PROCEDURE — 33864 ASCENDING AORTIC GRAFT: CPT | Mod: ,,, | Performed by: THORACIC SURGERY (CARDIOTHORACIC VASCULAR SURGERY)

## 2023-11-13 PROCEDURE — C1729 CATH, DRAINAGE: HCPCS | Performed by: THORACIC SURGERY (CARDIOTHORACIC VASCULAR SURGERY)

## 2023-11-13 PROCEDURE — 84100 ASSAY OF PHOSPHORUS: CPT | Performed by: STUDENT IN AN ORGANIZED HEALTH CARE EDUCATION/TRAINING PROGRAM

## 2023-11-13 PROCEDURE — D9220A PRA ANESTHESIA: Mod: ,,, | Performed by: ANESTHESIOLOGY

## 2023-11-13 PROCEDURE — 88305 TISSUE EXAM BY PATHOLOGIST: CPT | Mod: 59 | Performed by: PATHOLOGY

## 2023-11-13 PROCEDURE — 99291 CRITICAL CARE FIRST HOUR: CPT | Mod: ,,, | Performed by: ANESTHESIOLOGY

## 2023-11-13 PROCEDURE — 27200953 HC CARDIOPLEGIA SYSTEM

## 2023-11-13 PROCEDURE — 86920 COMPATIBILITY TEST SPIN: CPT | Performed by: STUDENT IN AN ORGANIZED HEALTH CARE EDUCATION/TRAINING PROGRAM

## 2023-11-13 PROCEDURE — 88304 TISSUE EXAM BY PATHOLOGIST: CPT | Performed by: PATHOLOGY

## 2023-11-13 PROCEDURE — 20000000 HC ICU ROOM

## 2023-11-13 DEVICE — IMPLANTABLE DEVICE: Type: IMPLANTABLE DEVICE | Site: AORTA | Status: FUNCTIONAL

## 2023-11-13 DEVICE — VALVE HEART PULMONARY CRYO LG: Type: IMPLANTABLE DEVICE | Site: HEART | Status: FUNCTIONAL

## 2023-11-13 RX ORDER — SODIUM CHLORIDE 0.9 % (FLUSH) 0.9 %
10 SYRINGE (ML) INJECTION
Status: DISCONTINUED | OUTPATIENT
Start: 2023-11-13 | End: 2023-11-19 | Stop reason: HOSPADM

## 2023-11-13 RX ORDER — ACETAMINOPHEN 500 MG
1000 TABLET ORAL EVERY 8 HOURS
Status: DISCONTINUED | OUTPATIENT
Start: 2023-11-13 | End: 2023-11-17

## 2023-11-13 RX ORDER — DEXMEDETOMIDINE HYDROCHLORIDE 4 UG/ML
0-1.4 INJECTION, SOLUTION INTRAVENOUS CONTINUOUS
Status: DISCONTINUED | OUTPATIENT
Start: 2023-11-13 | End: 2023-11-15

## 2023-11-13 RX ORDER — LIDOCAINE HYDROCHLORIDE 20 MG/ML
INJECTION, SOLUTION EPIDURAL; INFILTRATION; INTRACAUDAL; PERINEURAL
Status: DISCONTINUED | OUTPATIENT
Start: 2023-11-13 | End: 2023-11-13

## 2023-11-13 RX ORDER — HEPARIN SODIUM 1000 [USP'U]/ML
INJECTION, SOLUTION INTRAVENOUS; SUBCUTANEOUS
Status: DISCONTINUED | OUTPATIENT
Start: 2023-11-13 | End: 2023-11-13

## 2023-11-13 RX ORDER — ONDANSETRON 2 MG/ML
INJECTION INTRAMUSCULAR; INTRAVENOUS
Status: DISCONTINUED | OUTPATIENT
Start: 2023-11-13 | End: 2023-11-13

## 2023-11-13 RX ORDER — FAMOTIDINE 10 MG/ML
20 INJECTION INTRAVENOUS 2 TIMES DAILY
Status: DISCONTINUED | OUTPATIENT
Start: 2023-11-13 | End: 2023-11-14

## 2023-11-13 RX ORDER — CALCIUM GLUCONATE 20 MG/ML
2 INJECTION, SOLUTION INTRAVENOUS
Status: DISCONTINUED | OUTPATIENT
Start: 2023-11-13 | End: 2023-11-19 | Stop reason: HOSPADM

## 2023-11-13 RX ORDER — POTASSIUM CHLORIDE 14.9 MG/ML
20 INJECTION INTRAVENOUS
Status: DISCONTINUED | OUTPATIENT
Start: 2023-11-13 | End: 2023-11-16

## 2023-11-13 RX ORDER — LIDOCAINE HYDROCHLORIDE 10 MG/ML
1 INJECTION, SOLUTION EPIDURAL; INFILTRATION; INTRACAUDAL; PERINEURAL ONCE
Status: COMPLETED | OUTPATIENT
Start: 2023-11-13 | End: 2023-11-13

## 2023-11-13 RX ORDER — ALBUMIN HUMAN 50 G/1000ML
25 SOLUTION INTRAVENOUS ONCE
Status: COMPLETED | OUTPATIENT
Start: 2023-11-13 | End: 2023-11-13

## 2023-11-13 RX ORDER — ROCURONIUM BROMIDE 10 MG/ML
INJECTION, SOLUTION INTRAVENOUS
Status: DISCONTINUED | OUTPATIENT
Start: 2023-11-13 | End: 2023-11-13

## 2023-11-13 RX ORDER — CALCIUM GLUCONATE 20 MG/ML
1 INJECTION, SOLUTION INTRAVENOUS
Status: DISCONTINUED | OUTPATIENT
Start: 2023-11-13 | End: 2023-11-19 | Stop reason: HOSPADM

## 2023-11-13 RX ORDER — MIDAZOLAM HYDROCHLORIDE 1 MG/ML
INJECTION, SOLUTION INTRAMUSCULAR; INTRAVENOUS
Status: DISCONTINUED | OUTPATIENT
Start: 2023-11-13 | End: 2023-11-13

## 2023-11-13 RX ORDER — ACETAMINOPHEN 500 MG
1000 TABLET ORAL
Status: COMPLETED | OUTPATIENT
Start: 2023-11-13 | End: 2023-11-13

## 2023-11-13 RX ORDER — HYDROMORPHONE HYDROCHLORIDE 1 MG/ML
0.5 INJECTION, SOLUTION INTRAMUSCULAR; INTRAVENOUS; SUBCUTANEOUS
Status: DISCONTINUED | OUTPATIENT
Start: 2023-11-13 | End: 2023-11-14

## 2023-11-13 RX ORDER — NITROGLYCERIN 5 MG/ML
INJECTION, SOLUTION INTRAVENOUS
Status: DISCONTINUED | OUTPATIENT
Start: 2023-11-13 | End: 2023-11-13

## 2023-11-13 RX ORDER — CALCIUM GLUCONATE 20 MG/ML
3 INJECTION, SOLUTION INTRAVENOUS
Status: DISCONTINUED | OUTPATIENT
Start: 2023-11-13 | End: 2023-11-19 | Stop reason: HOSPADM

## 2023-11-13 RX ORDER — POTASSIUM CHLORIDE 29.8 MG/ML
40 INJECTION INTRAVENOUS
Status: DISCONTINUED | OUTPATIENT
Start: 2023-11-13 | End: 2023-11-16

## 2023-11-13 RX ORDER — TRANEXAMIC ACID 100 MG/ML
INJECTION, SOLUTION INTRAVENOUS
Status: DISCONTINUED | OUTPATIENT
Start: 2023-11-13 | End: 2023-11-13

## 2023-11-13 RX ORDER — ACETAMINOPHEN 325 MG/1
650 TABLET ORAL EVERY 6 HOURS PRN
Status: DISCONTINUED | OUTPATIENT
Start: 2023-11-13 | End: 2023-11-14

## 2023-11-13 RX ORDER — MILRINONE LACTATE 0.2 MG/ML
0.25 INJECTION, SOLUTION INTRAVENOUS CONTINUOUS
Status: DISCONTINUED | OUTPATIENT
Start: 2023-11-13 | End: 2023-11-15

## 2023-11-13 RX ORDER — POTASSIUM CHLORIDE 14.9 MG/ML
60 INJECTION INTRAVENOUS
Status: DISCONTINUED | OUTPATIENT
Start: 2023-11-13 | End: 2023-11-16

## 2023-11-13 RX ORDER — PROPOFOL 10 MG/ML
0-50 INJECTION, EMULSION INTRAVENOUS CONTINUOUS
Status: DISCONTINUED | OUTPATIENT
Start: 2023-11-13 | End: 2023-11-14

## 2023-11-13 RX ORDER — PHENYLEPHRINE HCL IN 0.9% NACL 1 MG/10 ML
SYRINGE (ML) INTRAVENOUS
Status: DISCONTINUED | OUTPATIENT
Start: 2023-11-13 | End: 2023-11-13

## 2023-11-13 RX ORDER — FENTANYL CITRATE 50 UG/ML
INJECTION, SOLUTION INTRAMUSCULAR; INTRAVENOUS
Status: DISCONTINUED | OUTPATIENT
Start: 2023-11-13 | End: 2023-11-13

## 2023-11-13 RX ORDER — MAGNESIUM SULFATE HEPTAHYDRATE 40 MG/ML
4 INJECTION, SOLUTION INTRAVENOUS
Status: DISCONTINUED | OUTPATIENT
Start: 2023-11-13 | End: 2023-11-19 | Stop reason: HOSPADM

## 2023-11-13 RX ORDER — MILRINONE LACTATE 1 MG/ML
INJECTION INTRAVENOUS
Status: DISCONTINUED | OUTPATIENT
Start: 2023-11-13 | End: 2023-11-13

## 2023-11-13 RX ORDER — KETAMINE HCL IN 0.9 % NACL 50 MG/5 ML
SYRINGE (ML) INTRAVENOUS
Status: DISCONTINUED | OUTPATIENT
Start: 2023-11-13 | End: 2023-11-13

## 2023-11-13 RX ORDER — BUPIVACAINE HYDROCHLORIDE 5 MG/ML
INJECTION, SOLUTION EPIDURAL; INTRACAUDAL
Status: COMPLETED | OUTPATIENT
Start: 2023-11-13 | End: 2023-11-13

## 2023-11-13 RX ORDER — POLYETHYLENE GLYCOL 3350 17 G/17G
17 POWDER, FOR SOLUTION ORAL DAILY
Status: DISCONTINUED | OUTPATIENT
Start: 2023-11-13 | End: 2023-11-16

## 2023-11-13 RX ORDER — IPRATROPIUM BROMIDE AND ALBUTEROL SULFATE 2.5; .5 MG/3ML; MG/3ML
3 SOLUTION RESPIRATORY (INHALATION) EVERY 4 HOURS PRN
Status: DISCONTINUED | OUTPATIENT
Start: 2023-11-13 | End: 2023-11-19 | Stop reason: HOSPADM

## 2023-11-13 RX ORDER — CEFAZOLIN SODIUM 1 G/3ML
INJECTION, POWDER, FOR SOLUTION INTRAMUSCULAR; INTRAVENOUS
Status: DISCONTINUED | OUTPATIENT
Start: 2023-11-13 | End: 2023-11-13

## 2023-11-13 RX ORDER — DOCUSATE SODIUM 100 MG/1
100 CAPSULE, LIQUID FILLED ORAL 2 TIMES DAILY
Status: DISCONTINUED | OUTPATIENT
Start: 2023-11-13 | End: 2023-11-16

## 2023-11-13 RX ORDER — CALCIUM CHLORIDE INJECTION 100 MG/ML
INJECTION, SOLUTION INTRAVENOUS
Status: DISCONTINUED | OUTPATIENT
Start: 2023-11-13 | End: 2023-11-13

## 2023-11-13 RX ORDER — PROPOFOL 10 MG/ML
VIAL (ML) INTRAVENOUS
Status: DISCONTINUED | OUTPATIENT
Start: 2023-11-13 | End: 2023-11-13

## 2023-11-13 RX ORDER — ONDANSETRON 2 MG/ML
4 INJECTION INTRAMUSCULAR; INTRAVENOUS EVERY 6 HOURS PRN
Status: DISCONTINUED | OUTPATIENT
Start: 2023-11-13 | End: 2023-11-19 | Stop reason: HOSPADM

## 2023-11-13 RX ORDER — PROCHLORPERAZINE EDISYLATE 5 MG/ML
5 INJECTION INTRAMUSCULAR; INTRAVENOUS EVERY 6 HOURS PRN
Status: DISCONTINUED | OUTPATIENT
Start: 2023-11-13 | End: 2023-11-19 | Stop reason: HOSPADM

## 2023-11-13 RX ORDER — TRANEXAMIC ACID 100 MG/ML
INJECTION, SOLUTION INTRAVENOUS CONTINUOUS PRN
Status: DISCONTINUED | OUTPATIENT
Start: 2023-11-13 | End: 2023-11-13

## 2023-11-13 RX ORDER — PROTAMINE SULFATE 10 MG/ML
INJECTION, SOLUTION INTRAVENOUS
Status: DISCONTINUED | OUTPATIENT
Start: 2023-11-13 | End: 2023-11-13

## 2023-11-13 RX ORDER — MUPIROCIN 20 MG/G
OINTMENT TOPICAL 2 TIMES DAILY
Status: COMPLETED | OUTPATIENT
Start: 2023-11-13 | End: 2023-11-17

## 2023-11-13 RX ORDER — MUPIROCIN 20 MG/G
OINTMENT TOPICAL
Status: DISCONTINUED | OUTPATIENT
Start: 2023-11-13 | End: 2023-11-13 | Stop reason: HOSPADM

## 2023-11-13 RX ORDER — EPINEPHRINE 1 MG/ML
INJECTION, SOLUTION, CONCENTRATE INTRAVENOUS
Status: DISCONTINUED | OUTPATIENT
Start: 2023-11-13 | End: 2023-11-13

## 2023-11-13 RX ORDER — INDOMETHACIN 25 MG/1
50 CAPSULE ORAL ONCE
Status: COMPLETED | OUTPATIENT
Start: 2023-11-13 | End: 2023-11-13

## 2023-11-13 RX ORDER — ASPIRIN 325 MG
325 TABLET ORAL DAILY
Status: DISCONTINUED | OUTPATIENT
Start: 2023-11-13 | End: 2023-11-19 | Stop reason: HOSPADM

## 2023-11-13 RX ORDER — ENOXAPARIN SODIUM 100 MG/ML
40 INJECTION SUBCUTANEOUS EVERY 24 HOURS
Status: DISCONTINUED | OUTPATIENT
Start: 2023-11-14 | End: 2023-11-15

## 2023-11-13 RX ORDER — NOREPINEPHRINE BITARTRATE 1 MG/ML
INJECTION, SOLUTION INTRAVENOUS
Status: DISCONTINUED | OUTPATIENT
Start: 2023-11-13 | End: 2023-11-13

## 2023-11-13 RX ORDER — OXYCODONE HYDROCHLORIDE 5 MG/1
5 TABLET ORAL EVERY 4 HOURS PRN
Status: DISCONTINUED | OUTPATIENT
Start: 2023-11-13 | End: 2023-11-14

## 2023-11-13 RX ORDER — OXYCODONE HYDROCHLORIDE 10 MG/1
10 TABLET ORAL EVERY 4 HOURS PRN
Status: DISCONTINUED | OUTPATIENT
Start: 2023-11-13 | End: 2023-11-14

## 2023-11-13 RX ORDER — MAGNESIUM SULFATE HEPTAHYDRATE 40 MG/ML
2 INJECTION, SOLUTION INTRAVENOUS
Status: DISCONTINUED | OUTPATIENT
Start: 2023-11-13 | End: 2023-11-19 | Stop reason: HOSPADM

## 2023-11-13 RX ORDER — PROPOFOL 10 MG/ML
INJECTION, EMULSION INTRAVENOUS
Status: COMPLETED
Start: 2023-11-13 | End: 2023-11-13

## 2023-11-13 RX ADMIN — PROPOFOL 50 MCG/KG/MIN: 10 INJECTION, EMULSION INTRAVENOUS at 03:11

## 2023-11-13 RX ADMIN — Medication 100 MCG: at 08:11

## 2023-11-13 RX ADMIN — NOREPINEPHRINE BITARTRATE 8 MCG: 1 INJECTION, SOLUTION, CONCENTRATE INTRAVENOUS at 01:11

## 2023-11-13 RX ADMIN — SODIUM CHLORIDE, SODIUM GLUCONATE, SODIUM ACETATE, POTASSIUM CHLORIDE, MAGNESIUM CHLORIDE, SODIUM PHOSPHATE, DIBASIC, AND POTASSIUM PHOSPHATE: .53; .5; .37; .037; .03; .012; .00082 INJECTION, SOLUTION INTRAVENOUS at 07:11

## 2023-11-13 RX ADMIN — CEFAZOLIN 2 G: 330 INJECTION, POWDER, FOR SOLUTION INTRAMUSCULAR; INTRAVENOUS at 07:11

## 2023-11-13 RX ADMIN — HEPARIN SODIUM 17000 UNITS: 1000 INJECTION, SOLUTION INTRAVENOUS; SUBCUTANEOUS at 08:11

## 2023-11-13 RX ADMIN — ONDANSETRON 1 G: 2 INJECTION INTRAMUSCULAR; INTRAVENOUS at 02:11

## 2023-11-13 RX ADMIN — AMIODARONE HYDROCHLORIDE 150 MG: 50 INJECTION, SOLUTION INTRAVENOUS at 01:11

## 2023-11-13 RX ADMIN — FENTANYL CITRATE 200 MCG: 50 INJECTION INTRAMUSCULAR; INTRAVENOUS at 07:11

## 2023-11-13 RX ADMIN — SODIUM CHLORIDE: 0.9 INJECTION, SOLUTION INTRAVENOUS at 06:11

## 2023-11-13 RX ADMIN — Medication 50 MCG: at 08:11

## 2023-11-13 RX ADMIN — POLYETHYLENE GLYCOL 3350 17 G: 17 POWDER, FOR SOLUTION ORAL at 05:11

## 2023-11-13 RX ADMIN — ROCURONIUM BROMIDE 50 MG: 10 INJECTION INTRAVENOUS at 08:11

## 2023-11-13 RX ADMIN — PROPOFOL 100 MG: 10 INJECTION, EMULSION INTRAVENOUS at 07:11

## 2023-11-13 RX ADMIN — CALCIUM CHLORIDE 250 MG: 100 INJECTION, SOLUTION INTRAVENOUS at 12:11

## 2023-11-13 RX ADMIN — SODIUM BICARBONATE: 84 INJECTION, SOLUTION INTRAVENOUS at 10:11

## 2023-11-13 RX ADMIN — BUPIVACAINE HYDROCHLORIDE 20 ML: 5 INJECTION, SOLUTION EPIDURAL; INTRACAUDAL; PERINEURAL at 06:11

## 2023-11-13 RX ADMIN — CEFAZOLIN 2 G: 330 INJECTION, POWDER, FOR SOLUTION INTRAMUSCULAR; INTRAVENOUS at 11:11

## 2023-11-13 RX ADMIN — ASPIRIN 325 MG ORAL TABLET 325 MG: 325 PILL ORAL at 08:11

## 2023-11-13 RX ADMIN — CEFAZOLIN 2 G: 2 INJECTION, POWDER, FOR SOLUTION INTRAMUSCULAR; INTRAVENOUS at 08:11

## 2023-11-13 RX ADMIN — Medication 50 MG: at 07:11

## 2023-11-13 RX ADMIN — EPINEPHRINE 10 MCG: 1 INJECTION, SOLUTION, CONCENTRATE INTRAVENOUS at 12:11

## 2023-11-13 RX ADMIN — ACETAMINOPHEN 1000 MG: 500 TABLET ORAL at 05:11

## 2023-11-13 RX ADMIN — CALCIUM CHLORIDE 750 MG: 100 INJECTION, SOLUTION INTRAVENOUS at 12:11

## 2023-11-13 RX ADMIN — MIDAZOLAM 2 MG: 1 INJECTION INTRAMUSCULAR; INTRAVENOUS at 07:11

## 2023-11-13 RX ADMIN — EPINEPHRINE 20 MCG: 1 INJECTION, SOLUTION, CONCENTRATE INTRAVENOUS at 01:11

## 2023-11-13 RX ADMIN — MUPIROCIN: 20 OINTMENT TOPICAL at 05:11

## 2023-11-13 RX ADMIN — MILRINONE LACTATE 5000 MCG: 1 INJECTION, SOLUTION INTRAVENOUS at 12:11

## 2023-11-13 RX ADMIN — NOREPINEPHRINE BITARTRATE 16 MCG: 1 INJECTION, SOLUTION, CONCENTRATE INTRAVENOUS at 01:11

## 2023-11-13 RX ADMIN — NITROGLYCERIN 25 MCG: 5 INJECTION, SOLUTION INTRAVENOUS at 08:11

## 2023-11-13 RX ADMIN — MUPIROCIN: 20 OINTMENT TOPICAL at 08:11

## 2023-11-13 RX ADMIN — NITROGLYCERIN 50 MCG: 5 INJECTION, SOLUTION INTRAVENOUS at 08:11

## 2023-11-13 RX ADMIN — NOREPINEPHRINE BITARTRATE 0.04 MCG/KG/MIN: 1 INJECTION, SOLUTION, CONCENTRATE INTRAVENOUS at 12:11

## 2023-11-13 RX ADMIN — POTASSIUM CHLORIDE 40 MEQ: 29.8 INJECTION, SOLUTION INTRAVENOUS at 06:11

## 2023-11-13 RX ADMIN — AMIODARONE HYDROCHLORIDE 1 MG/MIN: 1.8 INJECTION, SOLUTION INTRAVENOUS at 07:11

## 2023-11-13 RX ADMIN — LIDOCAINE HYDROCHLORIDE 60 MG: 20 INJECTION, SOLUTION EPIDURAL; INFILTRATION; INTRACAUDAL at 07:11

## 2023-11-13 RX ADMIN — ROCURONIUM BROMIDE 100 MG: 10 INJECTION INTRAVENOUS at 07:11

## 2023-11-13 RX ADMIN — FAMOTIDINE 20 MG: 10 INJECTION, SOLUTION INTRAVENOUS at 05:11

## 2023-11-13 RX ADMIN — LIDOCAINE HYDROCHLORIDE 10 MG: 10 INJECTION, SOLUTION EPIDURAL; INFILTRATION; INTRACAUDAL; PERINEURAL at 06:11

## 2023-11-13 RX ADMIN — EPINEPHRINE 0.04 MCG/KG/MIN: 1 INJECTION INTRAMUSCULAR; INTRAVENOUS; SUBCUTANEOUS at 12:11

## 2023-11-13 RX ADMIN — PROTAMINE SULFATE 110 MG: 10 INJECTION, SOLUTION INTRAVENOUS at 01:11

## 2023-11-13 RX ADMIN — ROCURONIUM BROMIDE 50 MG: 10 INJECTION INTRAVENOUS at 11:11

## 2023-11-13 RX ADMIN — TRANEXAMIC ACID 1 MG/KG/HR: 100 INJECTION INTRAVENOUS at 07:11

## 2023-11-13 RX ADMIN — ALBUMIN (HUMAN) 25 G: 12.5 SOLUTION INTRAVENOUS at 05:11

## 2023-11-13 RX ADMIN — MILRINONE LACTATE IN DEXTROSE 0.5 MCG/KG/MIN: 200 INJECTION, SOLUTION INTRAVENOUS at 02:11

## 2023-11-13 RX ADMIN — METHADONE HYDROCHLORIDE 12 MG: 10 INJECTION, SOLUTION INTRAMUSCULAR; INTRAVENOUS; SUBCUTANEOUS at 07:11

## 2023-11-13 RX ADMIN — SODIUM CHLORIDE 2 UNITS/HR: 9 INJECTION, SOLUTION INTRAVENOUS at 09:11

## 2023-11-13 RX ADMIN — DOCUSATE SODIUM 100 MG: 100 CAPSULE, LIQUID FILLED ORAL at 08:11

## 2023-11-13 RX ADMIN — ACETAMINOPHEN 1000 MG: 500 TABLET ORAL at 10:11

## 2023-11-13 RX ADMIN — ACETAMINOPHEN 1000 MG: 500 TABLET ORAL at 06:11

## 2023-11-13 RX ADMIN — MUPIROCIN: 20 OINTMENT TOPICAL at 06:11

## 2023-11-13 RX ADMIN — MIDAZOLAM 2 MG: 1 INJECTION INTRAMUSCULAR; INTRAVENOUS at 06:11

## 2023-11-13 RX ADMIN — ALBUMIN HUMAN 25 G: 50 SOLUTION INTRAVENOUS at 08:11

## 2023-11-13 RX ADMIN — AMIODARONE HYDROCHLORIDE 1 MG/MIN: 50 INJECTION, SOLUTION INTRAVENOUS at 01:11

## 2023-11-13 RX ADMIN — MILRINONE LACTATE 5000 MCG: 1 INJECTION, SOLUTION INTRAVENOUS at 01:11

## 2023-11-13 RX ADMIN — FAMOTIDINE 20 MG: 10 INJECTION, SOLUTION INTRAVENOUS at 08:11

## 2023-11-13 RX ADMIN — PROTAMINE SULFATE 10 MG: 10 INJECTION, SOLUTION INTRAVENOUS at 01:11

## 2023-11-13 RX ADMIN — SODIUM BICARBONATE 50 MEQ: 84 INJECTION, SOLUTION INTRAVENOUS at 08:11

## 2023-11-13 RX ADMIN — CLEVIPIDINE 1 MG/HR: 0.5 EMULSION INTRAVENOUS at 03:11

## 2023-11-13 RX ADMIN — TRANEXAMIC ACID 620 MG: 100 INJECTION, SOLUTION INTRAVENOUS at 07:11

## 2023-11-13 NOTE — ASSESSMENT & PLAN NOTE
Neuro/Psych:   - Sedation: propofol infusion  - Pain:    - Scheduled Tylenol 1000mg Q8H  - PRN Oxycodone 5mg/10mg  - Psych: sedated, intubated            Cardiac:   - s/p Ross procedure for bicuspid aortic valve and PFO closure with Dr. Montgomery  - Postoperative MONCHO: mild right ventricular dysfunction with normal LV function  - BP Goal: MAP>60 mmHg, SBP<120 mmHg  - Inotropes/Pressors: Milrinone 0.375, Epinephrine 0.04  - Anti-HTNs: Clevidipine PRN  - Rhythm: NSR  - Beta Blocker: none  - Statin: Start when extubated  -       Pulmonary:   - Intubated  - Goal SpO2 >92%  - Will wean ventilator support as tolerated to extubate  - Chest Tubes x 2 (2 Meds)  - ABGs Q1H x 6H, Q3H x 3  - ABGS PRN      Renal  - Trend BUN/Cr   - Maintain Crooks, record strict Is/Os  - Monitor UOP cc/24H, Net cc    Recent Labs   Lab 11/08/23  0846 11/13/23  1518   BUN 19 18   CREATININE 0.9 0.9        FEN / GI:   - Daily CMP, PRN K/Mag/Phos per protocol   - Replace electrolytes as needed  - Nutrition: NPO pending extubation  - Bowel Regimen: Miralax, docusate     ID:   - Tmax: n/a  - Abx: Complete perioperative cefazolin 2g Q8H x 5 doses    Recent Labs   Lab 11/08/23  0846 11/13/23  1518   WBC 5.46 10.55        Heme/Onc:   - Hgb 14.3 pre-operatively  - Received 2 units of PRBCs, 950cc Cell Saver intraoperatively  - CBC daily  - ASA 325mg daily, will start pending chest tube output    Recent Labs   Lab 11/08/23  0846 11/13/23  1518   HGB 14.3 10.3*    159   APTT 25.8 31.3   INR 1.0 1.1        Endocrine:   - CTS Goal -140  - HgbA1c:   - Endocrinology consulted for insulin management     PPx:   Feeding: NPO, will advance as tolerated following extubation  Analgesia/Sedation: Propofol  Thromboembolic Prevention: Enoxaparin, start POD1  HOB >30: Yes  Stress Ulcer: Yes - famotidine 20mg IV BID  Glucose Control: Yes, insulin management per Endocrinology     Lines/Drains/Airway:   ETT    Art Line   Central Line   Crooks   Chest Tubes: 2  mediastinal   Pacing Wires: Temporary ventricular pacing wires     Dispo/Code Status/Palliative:   - Continue SICU Care  - Full Code

## 2023-11-13 NOTE — PROGRESS NOTES
11/13/2023  Autotransfusion/Rapid Infusion Record:      11/13/2023  Autotransfusionist:  Niall Zhang    Surgeon(s) and Role:     * Nicola Montgomery MD - Primary  Anesthesiologist:  Sydney Pires MD    Past Medical History:   Diagnosis Date    Anxiety     Asthma     Bipolar affective     Depression     HIV (human immunodeficiency virus infection)     Immune deficiency disorder     Insomnia     Migraine headache     Mitral valve prolapse     Stroke        Procedure(s) (LRB):  REPLACEMENT, AORTIC VALVE, USING ROSS PROCEDURE (N/A)     2:56 PM    Equipment:    Cell Saver     R.I.S.  : [employer] Model: CATSmart or CATSplus : Sanders   Model: [id number]     Serial number: [id number]   Serial number:    Disposable lot #:    Disposable lot #:      Were extra cardiotomies used for cell saver?   no  if yes, #:      Solutions:  Anticoagulant: ACD-A   Expiration date: 04/24 Volume used: 700   Wash solution: 0.9% NaCl   Expiration date: 8/25 Volume used: 5129     Cell saver checklist  Time completed: 07:00           [x]   Circuit assembled correctly     [x]   Cell saver powered and operational     [x]   Vacuum connected, functional, adjust to max -150mmHg     [x]   Anticoagulant drip rate adjusted     [x]   Transfer bag properly labeled with patient name, expiration time, volume,       anticoagulant, OR number, and initials     [x]   Cell saver disinfected after use (completed at end of case)       Cell Saver volumes:    Total volume processed:     3384 mL     Total volume pRBCs recovered     950 mL     Volume pRBCs infused     950 mL         RIS checklist   Time completed:  []   RIS circuit assembled correctly     []   RIS power and operational     []   RIS disinfected after use (completed at end of case)       RIS volumes:    Total volume infused:    (see anesthesia record for blood       product information)    mL       Additional comments:

## 2023-11-13 NOTE — ANESTHESIA PROCEDURE NOTES
MONCHO    Diagnosis: aortic stenosis  Patient location during procedure: OR  Exam type: Baseline    Staffing  Performed: anesthesiologist and fellow     Anesthesiologist: Sydney Pires MD        Anesthesiologist Present  Yes      Setup & Induction  Patient preparation: bite block inserted  Probe Insertion: easy  Exam: completeDoppler Echo: 2D, continuous wave Doppler, color flow mapping and pulse wave Doppler.  Exam     Left Heart  Left Atruim: dilated  Left appendage velocity:55    Left Ventricle: cm, normal (men 4.2-5.9; women 3.8-5.2)  LV Wall Thickness (posterior wall):cm, mildly abnormal (men 1.1-1.3 cm; women 1.0-1.2 cm)    LVAD:no  Estimated Ejection Fraction: > 55% normal  Regional Wall Abnormalities: no RWMA            Right Heart  Right Ventricle: normal  Right Ventricle Function: normal  Right Atria:  normal    Intra Atrial Septum  PFO: yes by color flow doppler  no IAS aneurysm  no lipomatous hypertrophy  no Atrial Septal Defect (Asd)    Right Ventricle  Size: normal, Free Wall Thickness: normal    Aortic Valve:  Stenosis: severe.  Morphology: bicuspid aortic valve    Regurgitation: moderate (3+) aortic regurgitation      Mitral Valve:   Morphology:normal  Prolapse: none  Flail: no flail  Jet Description: mild and centrally-directedStenosis: no significant stenosis.    Tricuspid Valve:  Morphology: normal  Regurgitation: none    Pulmonic Valve:  Morphology:normal  Regurgitation(color flow): none    Great Vessels  Ascending Aorta Atherosclerosis: 2=mild dz (<3mm)  Aortic Arch Atherosclerosis: 2=mild dz (<3mm)  IABP: no  Descending Aorta Atherosclerosis: 3=sessile dz (3-5mm)  Aorta    Descending aorta IABP: no    Effusions none    SummaryFindings discussed with surgeon.    Other Findings   Diagnostic intraoperative MONCHO performed at the request of Dr. Montgomery for Ross procedure.     Baseline exam:  - On milrinone gtt  - LV systolic function is normal with LVEF >55%  - RV systolic function is normal  - No  regional wall motion abnormalities  - MV: Trace MR without stenosis  - AV: Severe AS with a Sandip 0 bicuspid aortic valve; severely restricted leaflet excursion; Moderate AI   - TV: Trace TR  - PV: Trace PI  - Persistent left SVC with large coronary sinus  - Large PFO via color flow doppler with evidence of left to right shunt  - No significant aortic pathology  - No effusions    Post-procedure exam:  - s/p Ross procedure and PFO closure  - On inotropic and pressor support with epi/norepi/milrinone gtts  - LV systolic function is normal with LVEF >55%  - RV systolic function is mildly hypokinetic   - No regional wall motion abnormalities  - Mild mitral regurgitation  - Pulmonic valve in native aortic valve position is well seated and in adequate position with adequate leaflet excursion. There is no paravalvular leak. The mean pressure gradient across the valve at the time of chest closure was 22 mmHg. There is trace to mild central transvalvular regurgitation.   - Mild tricuspid regurgitation  - Pulmonic valve within normal limits in structure and function  - L SVC remains  - No PFO via color flow doppler s/p interval PFO closure  - No new aortic pathology  - No effusions     Stable s/p chest closure  Probe removed atraumaticallyFor congenital abnormalities.

## 2023-11-13 NOTE — TELEPHONE ENCOUNTER
Letter regarding Phase II cardiac rehab was sent to patient, along with telephone # for Vandana Garcia.  Ana Barragan RN  Cardiac Rehab Nurse
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
## 2023-11-13 NOTE — HPI
Kendra Will is a 48 y.o. 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 She has GARCIA due to her severe AS. Decision for Ross operation vs mechanical aortic valve replacement.     Ms. Will presents to the SICU s/p ROSS procedure with Dr. Montgomery. On admission, she is intubated, sedated with propofol, and in stable condition. Inotropic and vasopressor requirements upon admission are 0.04 mcg/kg/min of epinephrine, 0.375 mcg/kg/min of milrinone to maintain blood pressure at a MAP 60 and SBP < 120. Central access includes a RIJ MAC with SLIC and arterial access includes a right radial arterial line.     Intraoperatively, she received 2 liters of crystalloid, 950cc of cell saver, 2 units PRBCs, 2 units of FFP, and 2 units of platelets. Urine output intraoperatively was recorded as 750cc. Her pre-operative echocardiogram was notable for normal biventricular function, moderate AS, and large PFO. Post-operative echo was notable for mild right ventricular dysfunction.    Immediate post-operative plans include hemodynamic stabilization and weaning of cardiac and respiratory support. Plan to wean vasopressors and inotropes as tolerated, wean ventilator support with goal of early extubation, and closely monitor fluid status with strict Is/Os and continued fluid resuscitation as needed.

## 2023-11-13 NOTE — ANESTHESIA PROCEDURE NOTES
Bilateral JASPER SS    Patient location during procedure: OR   Block not for primary anesthetic.  Reason for block: at surgeon's request and post-op pain management   Post-op Pain Location: Bilateral Chest Pain   Start time: 11/13/2023 6:56 AM  Timeout: 11/13/2023 6:55 AM   End time: 11/13/2023 7:00 AM    Staffing  Authorizing Provider: Sydney Pires MD  Performing Provider: Aaron Raymundo MD    Staffing  Performed by: Aaron Raymundo MD  Authorized by: Sydney Pires MD    Preanesthetic Checklist  Completed: patient identified, IV checked, site marked, risks and benefits discussed, surgical consent, monitors and equipment checked, pre-op evaluation and timeout performed  Peripheral Block  Patient position: sitting  Prep: ChloraPrep  Patient monitoring: heart rate, cardiac monitor, continuous pulse ox, frequent blood pressure checks and continuous capnometry  Block type: erector spinae plane  Laterality: bilateral  Injection technique: single shot  Interspace: T3-4    Needle  Needle type: Stimuplex   Needle gauge: 20 G  Needle length: 4 in  Needle localization: ultrasound guidance   -ultrasound image captured on disc.  Assessment  Injection assessment: negative aspiration and negative parasthesia  Paresthesia pain: none  Heart rate change: no  Slow fractionated injection: yes  Pain Tolerance: comfortable throughout block and no complaints  Medications:    Medications: bupivacaine (pf) (MARCAINE) injection 0.5% - Perineural   20 mL - 11/13/2023 6:59:00 AM

## 2023-11-13 NOTE — HPI
Reason for Consult: Management of Hyperglycemia     Surgical Procedure and Date:  11/13/23  REPLACEMENT, AORTIC VALVE, USING ROSS PROCEDURE (N/A)     Lab Results   Component Value Date    HGBA1C 5.2 04/06/2022       HPI:   Patient is a 48 y.o. 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 She has GARCIA due to her severe AS. Patient now presents for the above procedure(s). Endocrinology consulted for management of hyperglycemia.

## 2023-11-13 NOTE — NURSING
Pt transported to SICU [id number] with portable telemetry. Pt connected to ICU monitor and Ventilator. CTS MD, Charge RN, and RT called to bedside for patient arrival. Report received from Anesthesia. New orders received and implemented. Pt assessed, immediate needs met. Family brought to bedside, updated on the patient's current condition and PoC for remainder of shift. Family also given ICU Welcome packet and educated on visiting hours. All questions answered, emotional support provided.     Admit Skin Note: Nurses Note -- 4 Eyes    11/13/2023   4:15 PM    Skin assessed during: Admit    [x] No Altered Skin Integrity Present    [x]Prevention Measures Documented    [] Yes- Altered Skin Integrity Present or Discovered   [] LDA Added if Not in Epic (Describe Wound)   [] New Altered Skin Integrity was Present on Admit and Documented in LDA   [] Wound Image Taken    Wound Care Consulted? No    Attending Nurse:  Sondra Chester RN    Second RN/Staff Member:  Janett Pichardo RN

## 2023-11-13 NOTE — PLAN OF CARE
SICU Staff Addendum--> Please link to resident H&P 11/13/23  I have reviewed and concur with the resident's history, physical, assessment, and plan.  I have personally interviewed and examined the patient at bedside.  See below for any additional findings.    Reason for admission:  S/P aortic valve replacement  Present on Admission:   Anxiety   Nonrheumatic mitral valve regurgitation   Nonrheumatic aortic valve insufficiency   Nonrheumatic aortic valve stenosis      Goals for Today:   - Immediate postop from ROSS procedure for bicuspid AS and PFO closure. Notable for mild-moderate RV dysfunction post-CPB, atrial fibrillation requiring cardioversion, and brief pacing requirement. MONCHO showing mild RV dysfxn on inotropic support at case conclusion with normal LV fxn.  - Suboptimal oxygenation during case but seems to be improving postop on ICU ventilator, weaning O2 as able and adjusting RR to pH  - Hemodynamics at goal SBP < 120, MAP > 60 on milrinone 0.375 and epi 0.04. Cleviprex as needed for afterload reduction  - No pacing requirement at present. Amiodarone to preserve sinus rhythm  - Adequate spontaneous urine output  - Monitoring chest tube output, presently minimal.     35 minutes of critical care time was spent personally by me on the following activities: development of treatment plan with patient or surrogate and bedside caregivers, discussions with consultants, evaluation of patient's response to treatment, examination of patient, ordering and performing treatments and interventions, ordering and review of laboratory studies, ordering and review of radiographic studies, pulse oximetry, re-evaluation of patient's condition.  This critical care time did not overlap with that of any other provider or involve time for any procedures.    Kenan Summers MD  Anesthesia Critical Care  [facility] [phone]

## 2023-11-13 NOTE — SUBJECTIVE & OBJECTIVE
Interval HPI:   Overnight events: Admitted to SICU. Remains intubated .BG stable on IV intensive insulin protocol. Diet NPO Except for: Sips with Medication    Eating:   NPO  Nausea: No  Hypoglycemia and intervention: No  Fever: No  TPN and/or TF: No  If yes, type of TF/TPN and rate: n/a    PMH, PSH, FH, SH reviewed     ROS:  Unable to obtain due to: Intubated     Review of Systems    Current Medications and/or Treatments Impacting Glycemic Control  Immunotherapy:    Immunosuppressants       None          Steroids:   Hormones (From admission, onward)      None          Pressors:    Autonomic Drugs (From admission, onward)      None          Hyperglycemia/Diabetes Medications:   Antihyperglycemics (From admission, onward)      Start     Stop Route Frequency Ordered    11/13/23 1400  insulin regular in 0.9 % NaCl 100 unit/100 mL (1 unit/mL) infusion        Question Answer Comment   Insulin rate changes (DO NOT MODIFY ANSWER) \\[email or web address] [facility] [place]\epic\Images\[email or web address]\InsulinInfusions\CTS INSULIN [id number]    Enter initial dose (Units/hr): 1        -- IV Continuous 11/13/23 1247             PHYSICAL EXAMINATION:  Vitals:    11/13/23 1519   BP:    Pulse: 87   Resp: (!) 22   Temp:      Body mass index is 27.54 kg/m².     Physical Exam   Constitutional: Well developed, NAD.  ENT: External ears no masses with nose patent  Neck: Supple; trachea midline  Cardiovascular: Normal heart sounds, no LE edema. DP +2 bilaterally.  Lungs: Normal effort; lungs anterior bilaterally clear to auscultation.  Intubated on a ventilator.  Abdomen: Soft, no masses, no hernias.  Hypoactive BS noted.  MS: No clubbing or cyanosis of nails noted; unable to assess gait.  Skin: No rashes, lesions, or ulcers; no nodules. Mid-sternal incision with telfa island dressing, CDI.  CT x 2.  LLE wrapped with ACE wrap.  Psychiatric: KRISHAN  Neurological: KRISHAN  Foot: Nails in good condition, no amputations noted

## 2023-11-13 NOTE — ANESTHESIA PROCEDURE NOTES
L Radial Arterial Line    Diagnosis: Aortic Stenosis    Patient location during procedure: done in OR    Staffing  Authorizing Provider: Sydney Pires MD  Performing Provider: Aaron Raymundo MD    Staffing  Performed by: Aaron Raymundo MD  Authorized by: Sydney Pires MD    Anesthesiologist was present at the time of the procedure.    Preanesthetic Checklist  Completed: patient identified, IV checked, site marked, risks and benefits discussed, surgical consent, monitors and equipment checked, pre-op evaluation, timeout performed and anesthesia consent givenL Radial Arterial Line  Skin Prep: chlorhexidine gluconate  Local Infiltration: lidocaine  Orientation: left  Location: radial    Catheter Size: 20 G  Catheter placement by Ultrasound guidance. Heme positive aspiration all ports.   Vessel Caliber: medium, patent, compressibility normal  Needle advanced into vessel with real time Ultrasound guidance.Insertion Attempts: 1  Assessment  Dressing: secured with tape and tegaderm  Patient: Tolerated well

## 2023-11-13 NOTE — OP NOTE
"[facility]  Cardiothoracic Surgery Operative Report    Patient Name:  Kendra Will; [id number]    Preoperative Diagnosis: Bicuspid aortic valve, severe aortic stenosis, patent foramen ovale, persistent left superior vena cava, mild mitral regurgitation    Postoperative Diagnosis:  Same    Date of Operation:  11/13/2023     Operation:  Aortic Root Replacement with Pulmonary autograft (Ross Procedure) and coronary reimplantation              - Pulmonary root replacement with 30mm Cryopreserved pulmonary homograft              - Valve sparing root replacement technique ("Florida Sleeve") for pulmonary autograft reinforcement using 28mm bulged aortic root woven polyester graft              - Ascending aorta replacement with 24mm woven polyester straight graft   - Closure of patent foramen ovale (primary closure)                Surgeon:  Nicola Montgomery MD    Assistant Surgeon:  none    Fellow:  none    Anesthesiologist:  Sydney Pires MD    ---------------------------------------------------------------------------------------------------------------------      Indications for surgery: Ms. Will is a 48 year-old woman (possible prior drug abuser) with a history of bicuspid aortic valve with severe aortic stenosis, persistent left superior vena cava, prior stroke, asthma, bipolar disorder, and HIV.  Recently, she has been symptomatic with dyspnea on exertion symptoms interfering with her quality of life.  Her most recent echocardiogram showed 56% ejection fraction with left ventricular hypertrophy, bicuspid aortic valve with severe aortic stenosis (NELSY 0.68cm2, velocity 4.62 m/s, mean gradient 49 mmHg), mild aortic regurgitation, and mild mitral regurgitation.  Angiogram showed nonobstructive disease.     CTA chest (PE study) showed persistent left superior vena cava (draining into coronary sinus) with connecting innominate vein to right superior vena cava, 3.2cm ascending aorta, minimal ascending aortic " and aortic arch calcifications.     We had an extensive discussion with her regarding the ACC/AHA guidelines and we agreed that she has indications for surgery involving Ross operation vs mechanical aortic valve replacement.  Due to the innominate vein connecting both superior vena cavae, we can likely snare the left superior vena cava instead of cannulating it. The risks and benefits were explained and informed consent was obtained.     Gross findings: No pericardial adhesions. The aortic valve was bicuspid with left-right commissural fusion and severe calcifications.  Superior portion of the pericardium closed at finish.  Good left ventricular function pre and post bypass with significant left ventricular hypertrophy.  Mild-moderate right ventricular dysfunction post bypass.      Procedure:  The patient was identified in the holding area and the indications for surgery were reviewed.  Afterwards, the patient was brought to the operating room and placed supine on the operating table. General anesthesia was induced and the patient was prepped and draped in the usual sterile fashion.  A surgical time out was performed.    A midline incision was made and deepened with electrocautery to expose the sternum.  The sternum was divided and hemostasis was obtained. A chest retractor was placed and the pericardium was divided. ACT guided heparinization was administered and the distal aortic arch, superior vena cava and inferior vena cava were cannulated. A separate cannulation in the ascending aorta was used for administration of cardioplegia. Cardiopulmonary bypass was commenced. The aorta was dissected free from all surrounding attachments.  The left superior vena cava was dissected free and snared since there was a connecting innominate vein and an average sized right superior vena cava. The right atrium was opened and the retrograde cardioplegia catheter was directly placed into the coronary sinus followed by a 4-0  prolene suture around the mouth of the coronary sinus to snare it.  Now, a cross-clamp was placed and 600cc of antegrade cold blood cardioplegia was administered, followed by 1100cc of retrograde cardioplegia. Subsequent doses of retrograde cardioplegia were administered every 20 minutes and continuous cold blood was given retrograde between doses of cardioplegia whenever feasible.  A left ventricular vent was placed through the right superior pulmonary vein.     Attention was turned to closure of the patent foramen ovale.  The right atriotomy was retracted and the patent foramen ovale noted.  Using a 4-0 SH prolene suture, the patent foramen ovale was closed in two layers.  It was inspected with a hook and no residual defect noted.     Next, the aorta was transected at the sinotubular junction. The distal end was retracted superiorly. Attention was turned towards resection of the root. Sharp dissection was used to remove all diseased aortic tissue to the level of the commissures. A 3-0 silk suture was placed above each commissure and these retraction sutures were clamped radially to the drapes. The right coronary ostium was circumferentially resected. Excess aortic tissue was removed from the right sinus. This process was repeated for the left coronary ostium and the left sinus tissue. Finally, the non-coronary sinus tissue was excised. After examination of the valve, I did not think it was repairable. We resected the leaflets and used rongeur for remaining calcium at the annulus.     Attention was turned towards harvesting of the pulmonary autograft.  The distal pulmonary trunk was transected just prior to its bifurcation.  The valve was inspected and found to have minimal fenestrations and adequate leaflet coaptation.  The pulmonary root was now resected from the right ventricular outflow tract with sharp dissection and cautery, being wary to avoid the left anterior descending artery and the first septal branch.   The pulmonary autograft was tailored for use.     Attention was turned to the first steps of the Valve Sparing Technique, the Florida Sleeve, via placement of the annuloplasty sutures.  The aortic root was dissected free around its base.  Six pledgetted valve sutures were placed just below the aortic annulus, in the LVOT, and through the outside of the aortic root, one at each rosmery and one at each commissure.  The autograft was sized with an aortic valve sizer (Medtronic Mosaic sizer + 3mm) and a 28mm bulged aortic root woven polyester graft was opened.  About 4cm of stretched out graft was resected then split in the middle to open it up. Two additional tear drop incisions were created where the coronary arteries would eventually fit into place.  Five of the annuloplasty sutures were now passed through the bottom portion of the straight graft, with the graft then being wrapped around the root (from pulmonary side to SVC side) and the last annuloplasty suture (noncoronary sinus location) was now placed at the bottom of the straight graft on each side of the split edges to bring the graft back into a [place] that encompassed the entire autograft (with the coronary arteries later sitting in the tear drop slits).  The first five annuloplasty sutures were tied leaving that last sixth suture to be tied at the completion of the case to ensure there is just the right amount of tension on the annuloplasty.  Also, later, after the completion of the [id number]-kp-GFYE and kjpuihscc-hy-jkskfpeyj aortic graft anastomosis, a few more simple interrupted 4-0 prolene sutures would be used to attach the edges of the graft together (helping to complete the ring) and then to attach the Florida Sleeve graft to the autograft-ascending aortic straight graft anastomosis, which would complete the Florida Sleeve technique.    Attention was turned towards the aortic root replacement (Ross procedure).  2-0 nonpledgetted braided sutures were  "placed in the LVOT and snapped radially.  The pulmonary autograft was flipped inside out, commissures aligned, and the three commissural LVOT sutures were placed in the matching three commissures of the autograft.  The autograft was now suspended with these three sutures and the remaining sutures in the LVOT were now placed through the autograft.  The autograft was everted, lowered into the LVOT, and the sutures were tied.  The autograft was now inspected and the leaflets had excellent coaptation.       Attention was turned to the autograft for the coronary reimplantation. An 11 blade scalpel then punch created apertures in the autograft for the left and right coronary ostia. Each anastomosis was constructed with 5-0 TF prolene suture, in a continuous manner.  The autograft was now trimmed to about 5mm distal to the coronary buttons.      Attention was turned to replacement of the ascending aorta.  The distal portion of the ascending aorta was resected all the way to the most superior part of the ascending aorta, just below the innominate artery take off.  A 24mm woven polyester straight graft was tailored for use.  The polyester graft was sewn to the pulmonary autograft with a continuous 5-0 RB prolene suture, the graft was tailored again, and sewn to the distal ascending aorta with another continuous 4-0 RB prolene suture.  A hole was created in the graft for de-airing.     Attention was turned to the pulmonary homograft root replacement.  A 30mm cryopreserved pulmonary homograft was defrosted and prepared for use.  The RVOT to homograft anastomosis was completed using a continuous 4-0 SH prolene suture.  Next, the distal pulmonary homograft was tailored to about 0.5cm to 1cm distal to the pulmonary commissures.  The distal homograft to recipient pulmonary artery anastomosis was completed with continuous 4-0 RB prolene suture.     A dose of warm "hot shot" cardioplegia was given retrograde. Air was evacuated and " the cross-clamp was removed.  The retrograde catheter removed and the right atrium closed with two layers of 4-0 prolene suture.     Transesophageal echocardiography confirmed perfect coaptation of the valve leaflets and the absence of aortic insufficiency.  The Florida Sleeve reinforcement was completed (as noted above). Hemostasis was obtained and the patient was weaned from cardio-pulmonary bypass on a moderate amount of inotropic support.  The total cardiopulmonary bypass time was 184 minutes and cross clamp time was 292 minutes. The cannulae were removed and heparin was reversed.  Drainage tubes were placed behind the sternum and temporary ventricular pacing wires were placed on the heart.  The superior portion of the pericardium was closed with interrupted sutures. The sternum was closed with interrupted steel wires and the soft tissue with layers of absorbable suture. A sterile dressing was applied and the patient was brought to the ICU in stable condition.      I was present in the operating room for the entire operation and immediately available thereafter.

## 2023-11-13 NOTE — PLAN OF CARE
Returned phone call, states will put orders in shortly.    Blood bank called, extension and ABO needed only.

## 2023-11-13 NOTE — ASSESSMENT & PLAN NOTE
BG goal 110-140 (CTS protocol)     Continue IV insulin infusion protocol  Requires intensive BG monitoring while on protocol (q hourly)    ** Please call Endocrine for any BG related issues **    Discharge planning: [facility]

## 2023-11-13 NOTE — ANESTHESIA PROCEDURE NOTES
R IJ MAC with SLIC    Diagnosis: Aortic stenosis  Patient location during procedure: done in OR    Staffing  Authorizing Provider: Sydney Pires MD  Performing Provider: Aaron Raymundo MD    Staffing  Performed: resident/CRNA   Performed by: Aaron Raymundo MD  Authorized by: Sydney Pires MD    Anesthesiologist was present at the time of the procedure.  Preanesthetic Checklist  Completed: patient identified, IV checked, site marked, risks and benefits discussed, surgical consent, monitors and equipment checked, pre-op evaluation, timeout performed and anesthesia consent given  Indication   Indication: hemodynamic monitoring, vascular access, med administration     Anesthesia   general anesthesia    Central Line   Skin Prep: skin prepped with ChloraPrep, skin prep agent completely dried prior to procedure  Sterile Barriers Followed: Yes    All five maximal barriers used- gloves, gown, cap, mask, and large sterile sheet    hand hygiene performed prior to central venous catheter insertion  Location: right internal jugular.   Catheter type: introducer  Catheter Size: 14 Fr  Ultrasound: vascular probe with ultrasound   Vessel Caliber: medium, patent, compressibility normal  Needle advanced into vessel with real time Ultrasound guidance.  Guidewire confirmed in vessel.  sterile gel and probe cover used in ultrasound-guided central venous catheter insertion  Manometry: none (guidewire confirmed via MONCHO)  Insertion Attempts: 1   Securement:line sutured, chlorhexidine patch, sterile dressing applied and blood return through all ports    Post-Procedure    Adverse Events:none      Guidewire  Guidewire removed intact, verified with nurse.

## 2023-11-13 NOTE — TRANSFER OF CARE
"Anesthesia Transfer of Care Note    Patient: Kendra Will    Procedure(s) Performed: Procedure(s) (LRB):  REPLACEMENT, AORTIC VALVE, USING ROSS PROCEDURE (N/A)    Patient location: ICU    Anesthesia Type: general    Transport from OR: Transported from OR intubated on 100% O2 by AMBU with adequate controlled ventilation. Upon arrival to PACU/ICU, patient attached to ventilator and auscultated to confirm bilateral breath sounds and adequate TV. Continuous ECG monitoring in transport. Continuous SpO2 monitoring in transport. Continuos invasive BP monitoring in transport    Post pain: adequate analgesia    Post assessment: no apparent anesthetic complications and tolerated procedure well    Post vital signs: stable    Level of consciousness: sedated    Nausea/Vomiting: no nausea/vomiting    Complications: none    Transfer of care protocol was followed      Last vitals: Visit Vitals  /63   Pulse 87   Temp 37.3 °C (99.1 °F) (Oral)   Resp (!) 22   Ht 4' 11" (1.499 m)   Wt 61.8 kg (136 lb 5.7 oz)   LMP 05/01/2018 (Approximate)   SpO2 100%   Breastfeeding No   BMI 27.54 kg/m²     "

## 2023-11-13 NOTE — SUBJECTIVE & OBJECTIVE
Follow-up For: Procedure(s) (LRB):  REPLACEMENT, AORTIC VALVE, USING ROSS PROCEDURE (N/A)  Replacement-valve-aortic (N/A)    Post-Operative Day: Day of Surgery     Past Medical History:   Diagnosis Date    Anxiety     Asthma     Bipolar affective     Depression     HIV (human immunodeficiency virus infection)     Immune deficiency disorder     Insomnia     Migraine headache     Mitral valve prolapse     Stroke        Past Surgical History:   Procedure Laterality Date    CERVICAL FUSION  5/13/2014    CORONARY ANGIOGRAPHY N/A 9/25/2023    Procedure: ANGIOGRAM, CORONARY ARTERY;  Surgeon: Micah Medel MD;  Location: [facility] CATH LAB;  Service: Cardiology;  Laterality: N/A;    HAND SURGERY      LEFT HEART CATHETERIZATION Left 9/25/2023    Procedure: Left heart cath;  Surgeon: Micah Medel MD;  Location: [facility] CATH LAB;  Service: Cardiology;  Laterality: Left;    NECK SURGERY         Review of patient's allergies indicates:   Allergen Reactions    Neurontin [gabapentin] Other (See Comments)     confusion    Soma [carisoprodol] Other (See Comments)     weakness    Symbicort [budesonide-formoterol] Other (See Comments)     Sore throat( did not get with advair)    Latex, natural rubber Rash    Levofloxacin Rash       Family History       Problem Relation (Age of Onset)    Breast cancer Paternal Aunt    Cancer Paternal Grandmother    Heart disease Maternal Grandfather    Hyperlipidemia Paternal Grandmother, Maternal Grandmother    Hypertension Mother, Maternal Grandmother    Lupus Sister    Stroke Paternal Grandmother, Maternal Grandmother          Tobacco Use    Smoking status: Never    Smokeless tobacco: Never   Substance and Sexual Activity    Alcohol use: No     Alcohol/week: 0.0 standard drinks of alcohol    Drug use: Not Currently     Comment: pt. states her  was giving her drugs and she doesnt know about it    Sexual activity: Never      Review of Systems   Unable to perform ROS: Intubated     Objective:      Vital Signs (Most Recent):  Temp: 98.3 °F (36.8 °C) (11/13/23 0558)  Pulse: 64 (11/13/23 0558)  Resp: 18 (11/13/23 0558)  BP: 116/63 (11/13/23 0559)  SpO2: 98 % (11/13/23 0558) Vital Signs (24h Range):  Temp:  [98.3 °F (36.8 °C)] 98.3 °F (36.8 °C)  Pulse:  [64] 64  Resp:  [18] 18  SpO2:  [98 %] 98 %  BP: (116)/(63) 116/63     Weight: 61.8 kg (136 lb 5.7 oz)  Body mass index is 27.54 kg/m².    No intake or output data in the 24 hours ending 11/13/23 0840       Physical Exam  Vitals and nursing note reviewed.   Constitutional:       General: She is not in acute distress.     Appearance: She is not ill-appearing or toxic-appearing.   HENT:      Head: Normocephalic and atraumatic.      Right Ear: External ear normal.      Left Ear: External ear normal.      Nose: Nose normal.   Eyes:      General:         Right eye: No discharge.         Left eye: No discharge.   Cardiovascular:      Rate and Rhythm: Normal rate and regular rhythm.      Pulses: Normal pulses.      Heart sounds: Normal heart sounds. No murmur heard.     No gallop.   Pulmonary:      Effort: Pulmonary effort is normal. No accessory muscle usage or respiratory distress.      Breath sounds: Normal breath sounds. No wheezing or rales.      Comments: Intubated, course breath sounds  Abdominal:      General: Abdomen is flat. There is no distension.      Palpations: Abdomen is soft.      Tenderness: There is no abdominal tenderness. There is no guarding.   Musculoskeletal:      Right lower leg: No edema.      Left lower leg: No edema.   Skin:     General: Skin is warm and dry.   Neurological:      Mental Status: She is alert.      Comments: Sedated, intubated   Psychiatric:         Speech: Speech normal.         Behavior: Behavior is not agitated or aggressive. Behavior is cooperative.      Comments: Sedated, intubated            Vents:       Lines/Drains/Airways       Central Venous Catheter Line  Duration             Introducer 11/13/23 0750 <1 day          "     Drain  Duration                  Urethral Catheter 11/13/23 0715 Silicone;Straight-tip;Temperature probe;Non-latex 14 Fr. <1 day              Airway  Duration                  Airway - Non-Surgical 11/13/23 0709 <1 day              Arterial Line  Duration             Arterial Line 11/13/23 0749 Left Radial <1 day              Peripheral Intravenous Line  Duration                  Peripheral IV - Single Lumen 11/13/23 0628 20 G Anterior;Right Forearm <1 day                    Significant Labs:    CBC/Anemia Profile:  No results for input(s): "WBC", "HGB", "HCT", "PLT", "MCV", "RDW", "IRON", "FERRITIN", "RETIC", "FOLATE", "RNQVUKSH30", "OCCULTBLOOD" in the last 48 hours.     Chemistries:  No results for input(s): "NA", "K", "CL", "CO2", "BUN", "CREATININE", "CALCIUM", "ALBUMIN", "PROT", "BILITOT", "ALKPHOS", "ALT", "AST", "GLUCOSE", "MG", "PHOS" in the last 48 hours.    All pertinent labs within the past 24 hours have been reviewed.    Significant Imaging: I have reviewed all pertinent imaging results/findings within the past 24 hours.  "

## 2023-11-13 NOTE — ANESTHESIA PROCEDURE NOTES
Intubation    Date/Time: 11/13/2023 7:09 AM    Performed by: Aaron Raymundo MD  Authorized by: Sydney Pires MD    Intubation:     Induction:  Intravenous    Intubated:  Postinduction    Mask Ventilation:  Easy mask    Attempts:  1    Attempted By:  Resident anesthesiologist    Method of Intubation:  Video laryngoscopy    Blade:  Treviño 3    Laryngeal View Grade: Grade I - full view of cords      Difficult Airway Encountered?: No      Complications:  None    Airway Device:  Oral endotracheal tube    Airway Device Size:  7.0    Style/Cuff Inflation:  Cuffed    Tube secured:  22    Secured at:  The lips    Placement Verified By:  Capnometry    Complicating Factors:  None    Findings Post-Intubation:  BS equal bilateral and atraumatic/condition of teeth unchanged

## 2023-11-13 NOTE — CONSULTS
[facility]  Endocrinology  Diabetes Consult Note    Consult Requested by: Nicola Montgomery MD   Reason for admit: S/P aortic valve replacement    HISTORY OF PRESENT ILLNESS:  Reason for Consult: Management of Hyperglycemia     Surgical Procedure and Date:  11/13/23  REPLACEMENT, AORTIC VALVE, USING ROSS PROCEDURE (N/A)     Lab Results   Component Value Date    HGBA1C 5.2 04/06/2022       HPI:   Patient is a 48 y.o. 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 She has GARCIA due to her severe AS. Patient now presents for the above procedure(s). Endocrinology consulted for management of hyperglycemia.       Interval HPI:   Overnight events: Admitted to SICU. Remains intubated .BG stable on IV intensive insulin protocol. Diet NPO Except for: Sips with Medication    Eating:   NPO  Nausea: No  Hypoglycemia and intervention: No  Fever: No  TPN and/or TF: No  If yes, type of TF/TPN and rate: n/a    PMH, PSH, FH, SH reviewed     ROS:  Unable to obtain due to: Intubated     Review of Systems    Current Medications and/or Treatments Impacting Glycemic Control  Immunotherapy:    Immunosuppressants       None          Steroids:   Hormones (From admission, onward)      None          Pressors:    Autonomic Drugs (From admission, onward)      None          Hyperglycemia/Diabetes Medications:   Antihyperglycemics (From admission, onward)      Start     Stop Route Frequency Ordered    11/13/23 1400  insulin regular in 0.9 % NaCl 100 unit/100 mL (1 unit/mL) infusion        Question Answer Comment   Insulin rate changes (DO NOT MODIFY ANSWER) [email or web address]\CTS [email or web address] [id number]    Enter initial dose (Units/hr): 1        -- IV Continuous 11/13/23 1247             PHYSICAL EXAMINATION:  Vitals:    11/13/23 1519  "  BP:    Pulse: 87   Resp: (!) 22   Temp:      Body mass index is 27.54 kg/m².     Physical Exam   Constitutional: Well developed, NAD.  ENT: External ears no masses with nose patent  Neck: Supple; trachea midline  Cardiovascular: Normal heart sounds, no LE edema. DP +2 bilaterally.  Lungs: Normal effort; lungs anterior bilaterally clear to auscultation.  Intubated on a ventilator.  Abdomen: Soft, no masses, no hernias.  Hypoactive BS noted.  MS: No clubbing or cyanosis of nails noted; unable to assess gait.  Skin: No rashes, lesions, or ulcers; no nodules. Mid-sternal incision with telfa island dressing, CDI.  CT x 2.  LLE wrapped with ACE wrap.  Psychiatric: KRISHAN  Neurological: KRISHAN  Foot: Nails in good condition, no amputations noted      Labs Reviewed and Include   Recent Labs   Lab 11/13/23  1518   *   CALCIUM 9.1   ALBUMIN 2.5*      K 4.0  4.0   *     Lab Results   Component Value Date    WBC 10.55 11/13/2023    HGB 10.3 (L) 11/13/2023    HCT 28 (L) 11/13/2023    MCV 91 11/13/2023     11/13/2023     No results for input(s): "TSH", "FREET4" in the last 168 hours.  Lab Results   Component Value Date    HGBA1C 5.2 04/06/2022       Nutritional status:   Body mass index is 27.54 kg/m².  Lab Results   Component Value Date    ALBUMIN 2.5 (L) 11/13/2023    ALBUMIN 3.9 11/08/2023    ALBUMIN 3.9 09/19/2023     No results found for: "PREALBUMIN"    Estimated Creatinine Clearance: 64.5 mL/min (based on SCr of 0.9 mg/dL).    Accu-Checks  Recent Labs     11/13/23  1518   POCTGLUCOSE 201*        ASSESSMENT and PLAN    Cardiac/Vascular  * S/P aortic valve replacement  Managed per primary team  Optimize BG control        Endocrine  Transient hyperglycemia post procedure  BG goal 110-140 (CTS protocol)     Continue IV insulin infusion protocol  Requires intensive BG monitoring while on protocol (q hourly)    ** Please call Endocrine for any BG related issues **    Discharge planning: TBD            Plan " discussed with patient, family, and RN at bedside.     Laya Villalpando NP  Endocrinology  [facility]

## 2023-11-14 PROBLEM — D62 ACUTE BLOOD LOSS ANEMIA: Status: ACTIVE | Noted: 2023-11-14

## 2023-11-14 LAB
ALBUMIN SERPL BCP-MCNC: 3.8 G/DL (ref 3.5–5.2)
ALLENS TEST: ABNORMAL
ALLENS TEST: NORMAL
ALP SERPL-CCNC: 36 U/L (ref 55–135)
ALT SERPL W/O P-5'-P-CCNC: 20 U/L (ref 10–44)
ANION GAP SERPL CALC-SCNC: 7 MMOL/L (ref 8–16)
APTT PPP: 27.2 SEC (ref 21–32)
AST SERPL-CCNC: 123 U/L (ref 10–40)
BILIRUB SERPL-MCNC: 0.8 MG/DL (ref 0.1–1)
BUN SERPL-MCNC: 20 MG/DL (ref 6–20)
CALCIUM SERPL-MCNC: 8.6 MG/DL (ref 8.7–10.5)
CHLORIDE SERPL-SCNC: 115 MMOL/L (ref 95–110)
CO2 SERPL-SCNC: 22 MMOL/L (ref 23–29)
CREAT SERPL-MCNC: 0.9 MG/DL (ref 0.5–1.4)
DELSYS: ABNORMAL
DELSYS: NORMAL
ERYTHROCYTE [DISTWIDTH] IN BLOOD BY AUTOMATED COUNT: 14.6 % (ref 11.5–14.5)
EST. GFR  (NO RACE VARIABLE): >60 ML/MIN/1.73 M^2
FLOW: 4
FLOW: 4
GLUCOSE SERPL-MCNC: 110 MG/DL (ref 70–110)
HCO3 UR-SCNC: 17.1 MMOL/L (ref 24–28)
HCO3 UR-SCNC: 18.5 MMOL/L (ref 24–28)
HCO3 UR-SCNC: 19.7 MMOL/L (ref 24–28)
HCO3 UR-SCNC: 20.4 MMOL/L (ref 24–28)
HCO3 UR-SCNC: 20.6 MMOL/L (ref 24–28)
HCO3 UR-SCNC: 21 MMOL/L (ref 24–28)
HCT VFR BLD AUTO: 26.5 % (ref 37–48.5)
HCT VFR BLD CALC: 23 %PCV (ref 36–54)
HCT VFR BLD CALC: 24 %PCV (ref 36–54)
HCT VFR BLD CALC: 24 %PCV (ref 36–54)
HCT VFR BLD CALC: 26 %PCV (ref 36–54)
HCT VFR BLD CALC: 26 %PCV (ref 36–54)
HCT VFR BLD CALC: 27 %PCV (ref 36–54)
HGB BLD-MCNC: 9.4 G/DL (ref 12–16)
INR PPP: 1.1 (ref 0.8–1.2)
LDH SERPL L TO P-CCNC: 0.71 MMOL/L (ref 0.36–1.25)
LDH SERPL L TO P-CCNC: 0.81 MMOL/L (ref 0.36–1.25)
LDH SERPL L TO P-CCNC: 1.06 MMOL/L (ref 0.36–1.25)
LDH SERPL L TO P-CCNC: 1.33 MMOL/L (ref 0.36–1.25)
LDH SERPL L TO P-CCNC: 2.01 MMOL/L (ref 0.36–1.25)
LDH SERPL L TO P-CCNC: 3.43 MMOL/L (ref 0.36–1.25)
MCH RBC QN AUTO: 31.3 PG (ref 27–31)
MCHC RBC AUTO-ENTMCNC: 35.5 G/DL (ref 32–36)
MCV RBC AUTO: 88 FL (ref 82–98)
MODE: ABNORMAL
MODE: NORMAL
PCO2 BLDA: 26.4 MMHG (ref 35–45)
PCO2 BLDA: 26.4 MMHG (ref 35–45)
PCO2 BLDA: 26.7 MMHG (ref 35–45)
PCO2 BLDA: 33.9 MMHG (ref 35–45)
PCO2 BLDA: 48.9 MMHG (ref 35–45)
PCO2 BLDA: 51.1 MMHG (ref 35–45)
PH SMN: 7.22 [PH] (ref 7.35–7.45)
PH SMN: 7.23 [PH] (ref 7.35–7.45)
PH SMN: 7.39 [PH] (ref 7.35–7.45)
PH SMN: 7.42 [PH] (ref 7.35–7.45)
PH SMN: 7.45 [PH] (ref 7.35–7.45)
PH SMN: 7.48 [PH] (ref 7.35–7.45)
PLATELET # BLD AUTO: 148 K/UL (ref 150–450)
PMV BLD AUTO: 11 FL (ref 9.2–12.9)
PO2 BLDA: 110 MMHG (ref 80–100)
PO2 BLDA: 116 MMHG (ref 80–100)
PO2 BLDA: 64 MMHG (ref 80–100)
PO2 BLDA: 67 MMHG (ref 80–100)
PO2 BLDA: 86 MMHG (ref 80–100)
PO2 BLDA: 93 MMHG (ref 80–100)
POC BE: -4 MMOL/L
POC BE: -4 MMOL/L
POC BE: -5 MMOL/L
POC BE: -7 MMOL/L
POC IONIZED CALCIUM: 1.19 MMOL/L (ref 1.06–1.42)
POC IONIZED CALCIUM: 1.22 MMOL/L (ref 1.06–1.42)
POC IONIZED CALCIUM: 1.23 MMOL/L (ref 1.06–1.42)
POC IONIZED CALCIUM: 1.23 MMOL/L (ref 1.06–1.42)
POC IONIZED CALCIUM: 1.24 MMOL/L (ref 1.06–1.42)
POC IONIZED CALCIUM: 1.25 MMOL/L (ref 1.06–1.42)
POC SATURATED O2: 87 % (ref 95–100)
POC SATURATED O2: 89 % (ref 95–100)
POC SATURATED O2: 97 % (ref 95–100)
POC SATURATED O2: 98 % (ref 95–100)
POC SATURATED O2: 99 % (ref 95–100)
POC SATURATED O2: 99 % (ref 95–100)
POC TCO2: 18 MMOL/L (ref 23–27)
POC TCO2: 19 MMOL/L (ref 23–27)
POC TCO2: 20 MMOL/L (ref 23–27)
POC TCO2: 22 MMOL/L (ref 23–27)
POC TCO2: 22 MMOL/L (ref 23–27)
POC TCO2: 23 MMOL/L (ref 23–27)
POCT GLUCOSE: 104 MG/DL (ref 70–110)
POCT GLUCOSE: 107 MG/DL (ref 70–110)
POCT GLUCOSE: 112 MG/DL (ref 70–110)
POCT GLUCOSE: 112 MG/DL (ref 70–110)
POCT GLUCOSE: 114 MG/DL (ref 70–110)
POCT GLUCOSE: 114 MG/DL (ref 70–110)
POCT GLUCOSE: 115 MG/DL (ref 70–110)
POCT GLUCOSE: 126 MG/DL (ref 70–110)
POCT GLUCOSE: 131 MG/DL (ref 70–110)
POCT GLUCOSE: 144 MG/DL (ref 70–110)
POCT GLUCOSE: 173 MG/DL (ref 70–110)
POTASSIUM BLD-SCNC: 3.9 MMOL/L (ref 3.5–5.1)
POTASSIUM BLD-SCNC: 4.1 MMOL/L (ref 3.5–5.1)
POTASSIUM BLD-SCNC: 4.1 MMOL/L (ref 3.5–5.1)
POTASSIUM BLD-SCNC: 4.8 MMOL/L (ref 3.5–5.1)
POTASSIUM BLD-SCNC: 5.4 MMOL/L (ref 3.5–5.1)
POTASSIUM BLD-SCNC: 5.8 MMOL/L (ref 3.5–5.1)
POTASSIUM SERPL-SCNC: 4.2 MMOL/L (ref 3.5–5.1)
PROT SERPL-MCNC: 5.2 G/DL (ref 6–8.4)
PROTHROMBIN TIME: 11.6 SEC (ref 9–12.5)
RBC # BLD AUTO: 3 M/UL (ref 4–5.4)
SAMPLE: ABNORMAL
SAMPLE: NORMAL
SITE: ABNORMAL
SITE: NORMAL
SODIUM BLD-SCNC: 139 MMOL/L (ref 136–145)
SODIUM BLD-SCNC: 139 MMOL/L (ref 136–145)
SODIUM BLD-SCNC: 144 MMOL/L (ref 136–145)
SODIUM BLD-SCNC: 144 MMOL/L (ref 136–145)
SODIUM BLD-SCNC: 145 MMOL/L (ref 136–145)
SODIUM BLD-SCNC: 146 MMOL/L (ref 136–145)
SODIUM SERPL-SCNC: 144 MMOL/L (ref 136–145)
SP02: 96
SP02: 96
WBC # BLD AUTO: 10.92 K/UL (ref 3.9–12.7)

## 2023-11-14 PROCEDURE — 25000003 PHARM REV CODE 250: Performed by: PHYSICIAN ASSISTANT

## 2023-11-14 PROCEDURE — 99900035 HC TECH TIME PER 15 MIN (STAT)

## 2023-11-14 PROCEDURE — 83605 ASSAY OF LACTIC ACID: CPT

## 2023-11-14 PROCEDURE — 94003 VENT MGMT INPAT SUBQ DAY: CPT

## 2023-11-14 PROCEDURE — 82803 BLOOD GASES ANY COMBINATION: CPT

## 2023-11-14 PROCEDURE — 25000003 PHARM REV CODE 250

## 2023-11-14 PROCEDURE — 84132 ASSAY OF SERUM POTASSIUM: CPT

## 2023-11-14 PROCEDURE — 99291 CRITICAL CARE FIRST HOUR: CPT | Mod: ,,, | Performed by: ANESTHESIOLOGY

## 2023-11-14 PROCEDURE — 63600175 PHARM REV CODE 636 W HCPCS

## 2023-11-14 PROCEDURE — 84295 ASSAY OF SERUM SODIUM: CPT

## 2023-11-14 PROCEDURE — 93010 EKG 12-LEAD: ICD-10-PCS | Mod: ,,, | Performed by: INTERNAL MEDICINE

## 2023-11-14 PROCEDURE — 37799 UNLISTED PX VASCULAR SURGERY: CPT

## 2023-11-14 PROCEDURE — 25000003 PHARM REV CODE 250: Performed by: STUDENT IN AN ORGANIZED HEALTH CARE EDUCATION/TRAINING PROGRAM

## 2023-11-14 PROCEDURE — 82565 ASSAY OF CREATININE: CPT

## 2023-11-14 PROCEDURE — 82330 ASSAY OF CALCIUM: CPT

## 2023-11-14 PROCEDURE — 85014 HEMATOCRIT: CPT

## 2023-11-14 PROCEDURE — 25000242 PHARM REV CODE 250 ALT 637 W/ HCPCS

## 2023-11-14 PROCEDURE — 20000000 HC ICU ROOM

## 2023-11-14 PROCEDURE — 63600175 PHARM REV CODE 636 W HCPCS: Performed by: STUDENT IN AN ORGANIZED HEALTH CARE EDUCATION/TRAINING PROGRAM

## 2023-11-14 PROCEDURE — 80053 COMPREHEN METABOLIC PANEL: CPT | Performed by: STUDENT IN AN ORGANIZED HEALTH CARE EDUCATION/TRAINING PROGRAM

## 2023-11-14 PROCEDURE — 99900026 HC AIRWAY MAINTENANCE (STAT)

## 2023-11-14 PROCEDURE — 85027 COMPLETE CBC AUTOMATED: CPT | Performed by: STUDENT IN AN ORGANIZED HEALTH CARE EDUCATION/TRAINING PROGRAM

## 2023-11-14 PROCEDURE — 85730 THROMBOPLASTIN TIME PARTIAL: CPT | Performed by: STUDENT IN AN ORGANIZED HEALTH CARE EDUCATION/TRAINING PROGRAM

## 2023-11-14 PROCEDURE — 93005 ELECTROCARDIOGRAM TRACING: CPT

## 2023-11-14 PROCEDURE — P9045 ALBUMIN (HUMAN), 5%, 250 ML: HCPCS | Mod: JG

## 2023-11-14 PROCEDURE — 85610 PROTHROMBIN TIME: CPT | Performed by: STUDENT IN AN ORGANIZED HEALTH CARE EDUCATION/TRAINING PROGRAM

## 2023-11-14 PROCEDURE — 99291 PR CRITICAL CARE, E/M 30-74 MINUTES: ICD-10-PCS | Mod: ,,, | Performed by: ANESTHESIOLOGY

## 2023-11-14 PROCEDURE — 99233 SBSQ HOSP IP/OBS HIGH 50: CPT | Mod: ,,, | Performed by: PHYSICIAN ASSISTANT

## 2023-11-14 PROCEDURE — 99233 PR SUBSEQUENT HOSPITAL CARE,LEVL III: ICD-10-PCS | Mod: ,,, | Performed by: PHYSICIAN ASSISTANT

## 2023-11-14 PROCEDURE — 82800 BLOOD PH: CPT

## 2023-11-14 PROCEDURE — 63600175 PHARM REV CODE 636 W HCPCS: Mod: JG

## 2023-11-14 PROCEDURE — 27000190 HC CPAP FULL FACE MASK W/VALVE

## 2023-11-14 PROCEDURE — 94660 CPAP INITIATION&MGMT: CPT | Mod: XB

## 2023-11-14 PROCEDURE — 93010 ELECTROCARDIOGRAM REPORT: CPT | Mod: ,,, | Performed by: INTERNAL MEDICINE

## 2023-11-14 PROCEDURE — 94761 N-INVAS EAR/PLS OXIMETRY MLT: CPT

## 2023-11-14 PROCEDURE — 27100171 HC OXYGEN HIGH FLOW UP TO 24 HOURS

## 2023-11-14 RX ORDER — HYDROMORPHONE HYDROCHLORIDE 1 MG/ML
0.5 INJECTION, SOLUTION INTRAMUSCULAR; INTRAVENOUS; SUBCUTANEOUS ONCE
Status: DISCONTINUED | OUTPATIENT
Start: 2023-11-14 | End: 2023-11-15

## 2023-11-14 RX ORDER — HALOPERIDOL 5 MG/ML
5 INJECTION INTRAMUSCULAR EVERY 6 HOURS PRN
Status: DISCONTINUED | OUTPATIENT
Start: 2023-11-14 | End: 2023-11-19 | Stop reason: HOSPADM

## 2023-11-14 RX ORDER — LIDOCAINE 50 MG/G
1 PATCH TOPICAL
Status: DISCONTINUED | OUTPATIENT
Start: 2023-11-14 | End: 2023-11-19 | Stop reason: HOSPADM

## 2023-11-14 RX ORDER — ALBUMIN HUMAN 50 G/1000ML
12.5 SOLUTION INTRAVENOUS ONCE
Status: COMPLETED | OUTPATIENT
Start: 2023-11-14 | End: 2023-11-14

## 2023-11-14 RX ORDER — IBUPROFEN 200 MG
16 TABLET ORAL
Status: DISCONTINUED | OUTPATIENT
Start: 2023-11-14 | End: 2023-11-17

## 2023-11-14 RX ORDER — GLUCAGON 1 MG
1 KIT INJECTION
Status: DISCONTINUED | OUTPATIENT
Start: 2023-11-14 | End: 2023-11-17

## 2023-11-14 RX ORDER — NOREPINEPHRINE BITARTRATE/D5W 4MG/250ML
PLASTIC BAG, INJECTION (ML) INTRAVENOUS
Status: COMPLETED
Start: 2023-11-14 | End: 2023-11-14

## 2023-11-14 RX ORDER — NALOXONE HCL 0.4 MG/ML
0.02 VIAL (ML) INJECTION
Status: DISCONTINUED | OUTPATIENT
Start: 2023-11-14 | End: 2023-11-16

## 2023-11-14 RX ORDER — HYDROMORPHONE HCL IN 0.9% NACL 6 MG/30 ML
PATIENT CONTROLLED ANALGESIA SYRINGE INTRAVENOUS CONTINUOUS
Status: DISCONTINUED | OUTPATIENT
Start: 2023-11-14 | End: 2023-11-16

## 2023-11-14 RX ORDER — ALBUMIN HUMAN 50 G/1000ML
25 SOLUTION INTRAVENOUS ONCE
Status: COMPLETED | OUTPATIENT
Start: 2023-11-14 | End: 2023-11-14

## 2023-11-14 RX ORDER — PROPOFOL 10 MG/ML
INJECTION, EMULSION INTRAVENOUS
Status: DISPENSED
Start: 2023-11-14 | End: 2023-11-14

## 2023-11-14 RX ORDER — ALBUMIN HUMAN 50 G/1000ML
SOLUTION INTRAVENOUS
Status: COMPLETED
Start: 2023-11-14 | End: 2023-11-14

## 2023-11-14 RX ORDER — IBUPROFEN 200 MG
24 TABLET ORAL
Status: DISCONTINUED | OUTPATIENT
Start: 2023-11-14 | End: 2023-11-17

## 2023-11-14 RX ORDER — NOREPINEPHRINE BITARTRATE/D5W 4MG/250ML
0-3 PLASTIC BAG, INJECTION (ML) INTRAVENOUS CONTINUOUS
Status: CANCELLED | OUTPATIENT
Start: 2023-11-14

## 2023-11-14 RX ORDER — KETOROLAC TROMETHAMINE 15 MG/ML
15 INJECTION, SOLUTION INTRAMUSCULAR; INTRAVENOUS ONCE
Status: COMPLETED | OUTPATIENT
Start: 2023-11-14 | End: 2023-11-14

## 2023-11-14 RX ORDER — INSULIN ASPART 100 [IU]/ML
0-10 INJECTION, SOLUTION INTRAVENOUS; SUBCUTANEOUS
Status: DISCONTINUED | OUTPATIENT
Start: 2023-11-14 | End: 2023-11-17

## 2023-11-14 RX ORDER — HYDROMORPHONE HYDROCHLORIDE 1 MG/ML
1 INJECTION, SOLUTION INTRAMUSCULAR; INTRAVENOUS; SUBCUTANEOUS
Status: DISCONTINUED | OUTPATIENT
Start: 2023-11-14 | End: 2023-11-14

## 2023-11-14 RX ORDER — FLUTICASONE FUROATE AND VILANTEROL 200; 25 UG/1; UG/1
1 POWDER RESPIRATORY (INHALATION) DAILY
Status: DISCONTINUED | OUTPATIENT
Start: 2023-11-14 | End: 2023-11-19 | Stop reason: HOSPADM

## 2023-11-14 RX ORDER — MONTELUKAST SODIUM 10 MG/1
10 TABLET ORAL DAILY
Status: DISCONTINUED | OUTPATIENT
Start: 2023-11-14 | End: 2023-11-19 | Stop reason: HOSPADM

## 2023-11-14 RX ORDER — RISPERIDONE 1 MG/1
1 TABLET ORAL 2 TIMES DAILY
Status: DISCONTINUED | OUTPATIENT
Start: 2023-11-14 | End: 2023-11-16

## 2023-11-14 RX ORDER — METHOCARBAMOL 500 MG/1
500 TABLET, FILM COATED ORAL 3 TIMES DAILY
Status: DISCONTINUED | OUTPATIENT
Start: 2023-11-14 | End: 2023-11-19 | Stop reason: HOSPADM

## 2023-11-14 RX ORDER — FAMOTIDINE 20 MG/1
20 TABLET, FILM COATED ORAL 2 TIMES DAILY
Status: DISCONTINUED | OUTPATIENT
Start: 2023-11-14 | End: 2023-11-15

## 2023-11-14 RX ADMIN — AMIODARONE HYDROCHLORIDE 1 MG/MIN: 1.8 INJECTION, SOLUTION INTRAVENOUS at 06:11

## 2023-11-14 RX ADMIN — DOCUSATE SODIUM 100 MG: 100 CAPSULE, LIQUID FILLED ORAL at 09:11

## 2023-11-14 RX ADMIN — INSULIN HUMAN 0.8 UNITS/HR: 1 INJECTION, SOLUTION INTRAVENOUS at 10:11

## 2023-11-14 RX ADMIN — ACETAMINOPHEN 1000 MG: 500 TABLET ORAL at 09:11

## 2023-11-14 RX ADMIN — NOREPINEPHRINE BITARTRATE 4 MG: 4 INJECTION, SOLUTION INTRAVENOUS at 05:11

## 2023-11-14 RX ADMIN — CEFAZOLIN 2 G: 2 INJECTION, POWDER, FOR SOLUTION INTRAMUSCULAR; INTRAVENOUS at 11:11

## 2023-11-14 RX ADMIN — ACETAMINOPHEN 1000 MG: 500 TABLET ORAL at 02:11

## 2023-11-14 RX ADMIN — INSULIN HUMAN 0.6 UNITS/HR: 1 INJECTION, SOLUTION INTRAVENOUS at 09:11

## 2023-11-14 RX ADMIN — ENOXAPARIN SODIUM 40 MG: 40 INJECTION SUBCUTANEOUS at 04:11

## 2023-11-14 RX ADMIN — LIDOCAINE 1 PATCH: 50 PATCH CUTANEOUS at 07:11

## 2023-11-14 RX ADMIN — HYDROMORPHONE HYDROCHLORIDE 0.5 MG: 0.5 INJECTION, SOLUTION INTRAMUSCULAR; INTRAVENOUS; SUBCUTANEOUS at 07:11

## 2023-11-14 RX ADMIN — DOCUSATE SODIUM 100 MG: 100 CAPSULE, LIQUID FILLED ORAL at 08:11

## 2023-11-14 RX ADMIN — AMIODARONE HYDROCHLORIDE 150 MG: 1.5 INJECTION, SOLUTION INTRAVENOUS at 06:11

## 2023-11-14 RX ADMIN — ALBUMIN HUMAN 25 G: 50 SOLUTION INTRAVENOUS at 03:11

## 2023-11-14 RX ADMIN — MONTELUKAST 10 MG: 10 TABLET, FILM COATED ORAL at 10:11

## 2023-11-14 RX ADMIN — VASOPRESSIN 0.04 UNITS/MIN: 20 INJECTION INTRAVENOUS at 09:11

## 2023-11-14 RX ADMIN — KETOROLAC TROMETHAMINE 15 MG: 15 INJECTION, SOLUTION INTRAMUSCULAR; INTRAVENOUS at 07:11

## 2023-11-14 RX ADMIN — SODIUM CHLORIDE 10 MG/HR: 9 INJECTION, SOLUTION INTRAVENOUS at 05:11

## 2023-11-14 RX ADMIN — FAMOTIDINE 20 MG: 20 TABLET, FILM COATED ORAL at 08:11

## 2023-11-14 RX ADMIN — MILRINONE LACTATE IN DEXTROSE 0.38 MCG/KG/MIN: 200 INJECTION, SOLUTION INTRAVENOUS at 07:11

## 2023-11-14 RX ADMIN — HYDROMORPHONE HYDROCHLORIDE 0.5 MG: 0.5 INJECTION, SOLUTION INTRAMUSCULAR; INTRAVENOUS; SUBCUTANEOUS at 02:11

## 2023-11-14 RX ADMIN — FLUTICASONE FUROATE AND VILANTEROL TRIFENATATE 1 PUFF: 200; 25 POWDER RESPIRATORY (INHALATION) at 11:11

## 2023-11-14 RX ADMIN — MUPIROCIN: 20 OINTMENT TOPICAL at 09:11

## 2023-11-14 RX ADMIN — ALBUMIN HUMAN 12.5 G: 50 SOLUTION INTRAVENOUS at 05:11

## 2023-11-14 RX ADMIN — ASPIRIN 325 MG ORAL TABLET 325 MG: 325 PILL ORAL at 08:11

## 2023-11-14 RX ADMIN — METHOCARBAMOL 500 MG: 500 TABLET ORAL at 02:11

## 2023-11-14 RX ADMIN — ONDANSETRON 4 MG: 2 INJECTION INTRAMUSCULAR; INTRAVENOUS at 10:11

## 2023-11-14 RX ADMIN — POLYETHYLENE GLYCOL 3350 17 G: 17 POWDER, FOR SOLUTION ORAL at 08:11

## 2023-11-14 RX ADMIN — DEXMEDETOMIDINE HYDROCHLORIDE 0.2 MCG/KG/HR: 4 INJECTION, SOLUTION INTRAVENOUS at 04:11

## 2023-11-14 RX ADMIN — MILRINONE LACTATE IN DEXTROSE 0.38 MCG/KG/MIN: 200 INJECTION, SOLUTION INTRAVENOUS at 05:11

## 2023-11-14 RX ADMIN — FAMOTIDINE 20 MG: 20 TABLET, FILM COATED ORAL at 09:11

## 2023-11-14 RX ADMIN — METHOCARBAMOL 500 MG: 500 TABLET ORAL at 09:11

## 2023-11-14 RX ADMIN — MUPIROCIN: 20 OINTMENT TOPICAL at 08:11

## 2023-11-14 RX ADMIN — RISPERIDONE 1 MG: 1 TABLET ORAL at 10:11

## 2023-11-14 RX ADMIN — CEFAZOLIN 2 G: 2 INJECTION, POWDER, FOR SOLUTION INTRAMUSCULAR; INTRAVENOUS at 03:11

## 2023-11-14 RX ADMIN — RISPERIDONE 1 MG: 1 TABLET ORAL at 09:11

## 2023-11-14 RX ADMIN — HYDROMORPHONE HYDROCHLORIDE 0.5 MG: 0.5 INJECTION, SOLUTION INTRAMUSCULAR; INTRAVENOUS; SUBCUTANEOUS at 04:11

## 2023-11-14 RX ADMIN — Medication: at 08:11

## 2023-11-14 RX ADMIN — Medication: at 04:11

## 2023-11-14 RX ADMIN — ALBUMIN (HUMAN) 12.5 G: 12.5 SOLUTION INTRAVENOUS at 05:11

## 2023-11-14 RX ADMIN — VASOPRESSIN 0.04 UNITS/MIN: 20 INJECTION INTRAVENOUS at 04:11

## 2023-11-14 NOTE — PLAN OF CARE
4/16/2021      Mera Miller  [address]  [facility] [address]        To Whom it May Concern,    Mera Miller was in the clinic today for an appointment. Please excuse her from work Friday, April 16th, 2021.   Sincerely,          Dr. LEROY Varela  [facility]  [address]  [address] [phone]             SICU PLAN OF CARE NOTE    Dx: S/P aortic valve replacement    Goals of Care: MAP >60, SBP <120    Vital Signs (last 12 hours):   Temp:  [98.2 °F (36.8 °C)-99.3 °F (37.4 °C)]   Pulse:  [82-91]   Resp:  [8-22]   BP: (107)/(58-60)   SpO2:  [100 %]   Arterial Line BP: ()/(44-64)      Neuro: Sedated and Moves All Extremities    Cardiac: NSR. V Pacer wires connected to pacemaker.    Respiratory: Ventilator    Gtts: Epinephrine , Milrinone, Propofol, Insulin, Cleviprex , and Amiodarone    Urine Output: Urinary Catheter 945 cc/shift    Drains: Chest Tube, total output 65 cc /  shift    Diet: NPO     Labs/Accuchecks: Daily Labs, Q1H Accuchecks. Q1H ABG's/Lytes and Lactic x6H then Q3H.    Skin: No skin breakdown noted. Foams applied. Pt weight shifting Q2H.    Shift Events: Pt admitted to SICU s/p ROSS and PFO closure. See admit note for details. Plan to transition to precedex and SBT tonight with intension's to extubate. CVP 8. 500cc Albumin given.

## 2023-11-14 NOTE — SUBJECTIVE & OBJECTIVE
"Interval HPI:   No acute events overnight. Patient in room 49002/41607 A. Blood glucose stable. BG at goal on current insulin regimen (IIP). Steroid use- None .   1 Day Post-Op  Renal function- Normal   Vasopressors-  None     Diet clear liquid     Eatin%  Nausea: No  Hypoglycemia and intervention: No  Fever: No  TPN and/or TF: No    BP (!) 85/49 (BP Location: Left arm, Patient Position: Lying)   Pulse 68   Temp 99.5 °F (37.5 °C)   Resp (!) 24   Ht 4' 11" (1.499 m)   Wt 61.8 kg (136 lb 5.7 oz)   LMP 2018 (Approximate) Comment: Irregular periods  SpO2 (!) 93%   Breastfeeding No   BMI 27.54 kg/m²     Labs Reviewed and Include    Recent Labs   Lab 23  0340      CALCIUM 8.6*   ALBUMIN 3.8   PROT 5.2*      K 4.2   CO2 22*   *   BUN 20   CREATININE 0.9   ALKPHOS 36*   ALT 20   *   BILITOT 0.8     Lab Results   Component Value Date    WBC 10.92 2023    HGB 9.4 (L) 2023    HCT 23 (L) 2023    MCV 88 2023     (L) 2023     No results for input(s): "TSH", "FREET4" in the last 168 hours.  Lab Results   Component Value Date    HGBA1C 5.2 2022       Nutritional status:   Body mass index is 27.54 kg/m².  Lab Results   Component Value Date    ALBUMIN 3.8 2023    ALBUMIN 2.5 (L) 2023    ALBUMIN 3.9 2023     No results found for: "PREALBUMIN"    Estimated Creatinine Clearance: 64.5 mL/min (based on SCr of 0.9 mg/dL).    Accu-Checks  Recent Labs     23  2207 23  2305 23  0005 23  0205 23  0313 23  0407 23  0542 23  0707 23  0812 23  0900   POCTGLUCOSE 195* 192* 173* 112* 112* 115* 114* 114* 107 104       Current Medications and/or Treatments Impacting Glycemic Control  Immunotherapy:    Immunosuppressants       None          Steroids:   Hormones (From admission, onward)      Start     Stop Route Frequency Ordered    23 1000  vasopressin (PITRESSIN) 0.2 " Units/mL in dextrose 5 % (D5W) 100 mL infusion         -- IV Continuous 11/14/23 0856          Pressors:    Autonomic Drugs (From admission, onward)      Start     Stop Route Frequency Ordered    11/13/23 1530  EPINEPHrine 5 mg in dextrose 5% 250 mL infusion (premix)        Question Answer Comment   Begin at (in mcg/kg/min): 0.02    Titrate by: (in mcg/kg/min) 0.02    Titrate interval: (in minutes) 5    Titrate to maintain: (SBP or MAP or Cardiac Index) MAP    Greater than: (in mmHg) 60    Maximum dose: (in mcg/kg/min) 2        -- IV Continuous 11/13/23 1523          Hyperglycemia/Diabetes Medications:   Antihyperglycemics (From admission, onward)      Start     Stop Route Frequency Ordered    11/14/23 1015  insulin regular in 0.9 % NaCl 100 unit/100 mL (1 unit/mL) infusion        Question:  Enter initial dose (Units/hr):  Answer:  1    -- IV Continuous 11/14/23 0911    11/14/23 1010  insulin aspart U-100 pen 0-10 Units         -- SubQ As needed (PRN) 11/14/23 0911

## 2023-11-14 NOTE — NURSING
Pt became hypotensive with start of Precedex gtt. CTS team called to bedside. Precedex and Propofol titrated off. Levo started. Additional 250 of Albumin ordered and administered. Pt became increasingly agitated, unresponsive to redirection. CTS orders to extubate. Pt extubated to Nasal Cannula, tolerating well.

## 2023-11-14 NOTE — PROGRESS NOTES
"[facility]  Endocrinology  Progress Note    Admit Date: 2023     Reason for Consult: Management of Hyperglycemia     Surgical Procedure and Date:  23  REPLACEMENT, AORTIC VALVE, USING ROSS PROCEDURE (N/A)     Lab Results   Component Value Date    HGBA1C 5.2 2022       HPI:   Patient is a 48 y.o. 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 She has GARCIA due to her severe AS. Patient now presents for the above procedure(s). Endocrinology consulted for management of hyperglycemia.       Interval HPI:   No acute events overnight. Patient in room 76941/77668 A. Blood glucose stable. BG at goal on current insulin regimen (IIP). Steroid use- None .   1 Day Post-Op  Renal function- Normal   Vasopressors-  None     Diet clear liquid     Eatin%  Nausea: No  Hypoglycemia and intervention: No  Fever: No  TPN and/or TF: No    BP (!) 85/49 (BP Location: Left arm, Patient Position: Lying)   Pulse 68   Temp 99.5 °F (37.5 °C)   Resp (!) 24   Ht 4' 11" (1.499 m)   Wt 61.8 kg (136 lb 5.7 oz)   LMP 2018 (Approximate) Comment: Irregular periods  SpO2 (!) 93%   Breastfeeding No   BMI 27.54 kg/m²     Labs Reviewed and Include    Recent Labs   Lab 23  0340      CALCIUM 8.6*   ALBUMIN 3.8   PROT 5.2*      K 4.2   CO2 22*   *   BUN 20   CREATININE 0.9   ALKPHOS 36*   ALT 20   *   BILITOT 0.8     Lab Results   Component Value Date    WBC 10.92 2023    HGB 9.4 (L) 2023    HCT 23 (L) 2023    MCV 88 2023     (L) 2023     No results for input(s): "TSH", "FREET4" in the last 168 hours.  Lab Results   Component Value Date    HGBA1C 5.2 2022       Nutritional status:   Body mass index is 27.54 kg/m².  Lab Results   Component Value Date    ALBUMIN 3.8 " "11/14/2023    ALBUMIN 2.5 (L) 11/13/2023    ALBUMIN 3.9 11/08/2023     No results found for: "PREALBUMIN"    Estimated Creatinine Clearance: 64.5 mL/min (based on SCr of 0.9 mg/dL).    Accu-Checks  Recent Labs     11/13/23  2207 11/13/23  2305 11/14/23  0005 11/14/23  0205 11/14/23  0313 11/14/23  0407 11/14/23  0542 11/14/23  0707 11/14/23  0812 11/14/23  0900   POCTGLUCOSE 195* 192* 173* 112* 112* 115* 114* 114* 107 104       Current Medications and/or Treatments Impacting Glycemic Control  Immunotherapy:    Immunosuppressants       None          Steroids:   Hormones (From admission, onward)      Start     Stop Route Frequency Ordered    11/14/23 1000  vasopressin (PITRESSIN) 0.2 Units/mL in dextrose 5 % (D5W) 100 mL infusion         -- IV Continuous 11/14/23 0856          Pressors:    Autonomic Drugs (From admission, onward)      Start     Stop Route Frequency Ordered    11/13/23 1530  EPINEPHrine 5 mg in dextrose 5% 250 mL infusion (premix)        Question Answer Comment   Begin at (in mcg/kg/min): 0.02    Titrate by: (in mcg/kg/min) 0.02    Titrate interval: (in minutes) 5    Titrate to maintain: (SBP or MAP or Cardiac Index) MAP    Greater than: (in mmHg) 60    Maximum dose: (in mcg/kg/min) 2        -- IV Continuous 11/13/23 1523          Hyperglycemia/Diabetes Medications:   Antihyperglycemics (From admission, onward)      Start     Stop Route Frequency Ordered    11/14/23 1015  insulin regular in 0.9 % NaCl 100 unit/100 mL (1 unit/mL) infusion        Question:  Enter initial dose (Units/hr):  Answer:  1    -- IV Continuous 11/14/23 0911    11/14/23 1010  insulin aspart U-100 pen 0-10 Units         -- SubQ As needed (PRN) 11/14/23 0911            ASSESSMENT and PLAN    Cardiac/Vascular  * S/P aortic valve replacement  Managed per primary team  Optimize BG control        Nonrheumatic aortic valve stenosis  Managed per primary team  Optimize bg control        Endocrine  Transient hyperglycemia post " procedure  BG goal 110-140 (CTS protocol)   No hx of DM. BG stable post surgery. Will switch to transition drip.  Diet advanced to CLD      -discontinue IV insulin infusion protocol  - Start transition drip at 0.8 u/ hr w/ stepdown parameters  - Start [facility] 150/25  -POCT glucose AC/HS/0200    ** Please call Endocrine for any BG related issues **    Discharge planning: LAVERN Nicolas PA-C  Endocrinology  [facility]

## 2023-11-14 NOTE — ADDENDUM NOTE
Addendum  created 11/14/23 1017 by Sydney Pires MD    Attestation recorded in Intraprocedure, Clinical Note Signed, Intraprocedure Attestations filed, Intraprocedure Blocks edited, Intraprocedure Event edited, Intraprocedure Staff edited, SmartForm saved

## 2023-11-14 NOTE — ASSESSMENT & PLAN NOTE
Neuro/Psych:   - Sedation: none  - Pain:    - Scheduled Tylenol 1000mg Q8H  - PRN Oxycodone 5mg/10mg  - PRN hydromorphone 0.5mg   - Psych: alert, awake, agitated  - She has a history of bipolar, anxiety, depression. She is prescribed risperidone, but she reports she does not take this medication.            Cardiac:   - s/p Ross procedure for bicuspid aortic valve and PFO closure with Dr. Montgomery  - Postoperative MONCHO: mild right ventricular dysfunction with normal LV function  - BP Goal: MAP>60 mmHg, SBP<120 mmHg  - Inotropes/Pressors: Milrinone 0.375, Epinephrine 0.02 - continue to wean vasopressor support.  - Anti-HTNs: Clevidipine PRN  - Rhythm: NSR, rate 80s  - Beta Blocker: Start per CTS  - Statin: Start per CTS  -       Pulmonary:   - Extubated this morning to nasal cannula  - Goal SpO2 >92%  - Wean supplemental oxygen as tolerated  - PRN albuterol for wheezing or SOB  - Patient is prescribed Advair, however she reports she does not use this  - Chest Tubes x 2 (2 Meds)  - ABGs Q1H x 6H, Q3H x 3  - ABGS PRN      Renal  - Trend BUN/Cr   - Maintain Crooks, record strict Is/Os  - Monitor UOP 1430cc/24H, Net +5678cc/24H    Recent Labs   Lab 11/08/23  0846 11/13/23  1518 11/14/23  0340   BUN 19 18 20   CREATININE 0.9 0.9 0.9          FEN / GI:   - Daily CMP, PRN K/Mag/Phos per protocol   - Replace electrolytes as needed  - Nutrition: NPO pending bedside swallow, advance to cardiac diet fluid restriction  - Bowel Regimen: Miralax, docusate     ID:   - Tmax: n/a  - Abx: Complete perioperative cefazolin 2g Q8H x 5 doses  - She has a history of HIV. She reports compliance with her antivirals. I will speak with our pharmacist on availability and reorder when appropriate.     Recent Labs   Lab 11/08/23  0846 11/13/23  1518 11/14/23  0340   WBC 5.46 10.55 10.92          Heme/Onc:   - Hgb 14.3 pre-operatively, stable 9.4 postoperatively  - Received 2 units of PRBCs, 950cc Cell Saver intraoperatively  - CBC daily  -  ASA 325mg daily, will start pending chest tube output    Recent Labs   Lab 11/08/23  0846 11/13/23  1518 11/14/23  0340   HGB 14.3 10.3* 9.4*    159 148*   APTT 25.8 31.3 27.2   INR 1.0 1.1 1.1          Endocrine:   - CTS Goal -140  - HgbA1c:   - Endocrinology consulted for insulin management     PPx:   Feeding: NPO, will advance as tolerated to cardiac diet  Analgesia/Sedation:   Thromboembolic Prevention: Enoxaparin, start POD1  HOB >30: Yes  Stress Ulcer: Not indicated, famotidine PO per CTS  Glucose Control: Yes, insulin management per Endocrinology     Lines/Drains/Airway:   Art Line   Central Line   Crooks   Chest Tubes: 2 mediastinal   Pacing Wires: Temporary ventricular pacing wires     Dispo/Code Status/Palliative:   - Continue SICU Care  - Full Code

## 2023-11-14 NOTE — PT/OT/SLP PROGRESS
Occupational Therapy      Patient Name:  Kendra Will   MRN:  [id number]    Patient not seen today secondary to patient intubated in am, not medically appropriate to safely participate following extubation/pm attempt. Will follow-up per schedule.    11/14/2023

## 2023-11-14 NOTE — SUBJECTIVE & OBJECTIVE
Interval History/Significant Events: Patient admitted to SICU yesterday afternoon/evening. Difficulty with extubation due to patient not following commands and with multiple vagal episodes bradying down. This morning she was extubated to nasal cannula.       Follow-up For: Procedure(s) (LRB):  REPLACEMENT, AORTIC VALVE, USING ROSS PROCEDURE (N/A)    Post-Operative Day: 1 Day Post-Op    Objective:     Vital Signs (Most Recent):  Temp: (!) 100.9 °F (38.3 °C) (11/14/23 0425)  Pulse: 80 (11/14/23 0430)  Resp: 20 (11/14/23 0425)  BP: (!) 105/57 (11/14/23 0400)  SpO2: 95 % (11/14/23 0430) Vital Signs (24h Range):  Temp:  [98.2 °F (36.8 °C)-100.9 °F (38.3 °C)] 100.9 °F (38.3 °C)  Pulse:  [64-91] 80  Resp:  [8-28] 20  SpO2:  [95 %-100 %] 95 %  BP: ()/(52-63) 105/57  Arterial Line BP: ()/(44-64) 98/47     Weight: 61.8 kg (136 lb 5.7 oz)  Body mass index is 27.54 kg/m².      Intake/Output Summary (Last 24 hours) at 11/14/2023 0552  Last data filed at 11/14/2023 0400  Gross per 24 hour   Intake 7353.5 ml   Output 1675 ml   Net 5678.5 ml          Physical Exam  Vitals and nursing note reviewed.   Constitutional:       General: She is not in acute distress.     Appearance: She is not ill-appearing or toxic-appearing.   HENT:      Head: Normocephalic and atraumatic.      Right Ear: External ear normal.      Left Ear: External ear normal.      Nose: Nose normal.   Eyes:      General:         Right eye: No discharge.         Left eye: No discharge.   Cardiovascular:      Rate and Rhythm: Normal rate and regular rhythm.      Pulses: Normal pulses.      Heart sounds: Normal heart sounds. No murmur heard.     No gallop.   Pulmonary:      Effort: Pulmonary effort is normal. No accessory muscle usage or respiratory distress.      Breath sounds: Normal breath sounds. No wheezing or rales.   Abdominal:      General: Abdomen is flat. There is no distension.      Palpations: Abdomen is soft.      Tenderness: There is no  abdominal tenderness. There is no guarding.   Musculoskeletal:      Right lower leg: No edema.      Left lower leg: No edema.   Skin:     General: Skin is warm and dry.   Neurological:      Mental Status: She is alert and oriented to person, place, and time.      Motor: No weakness.   Psychiatric:         Speech: Speech normal.         Behavior: Behavior is agitated. Behavior is not aggressive or combative.         Judgment: Judgment is impulsive.            Vents:  Vent Mode: Spont (11/14/23 0425)  Set Rate: 26 BPM (11/14/23 0300)  Vt Set: 350 mL (11/14/23 0300)  Pressure Support: 8 cmH20 (11/14/23 0425)  PEEP/CPAP: 5 cmH20 (11/14/23 0425)  Oxygen Concentration (%): 40 (11/14/23 0430)  Peak Airway Pressure: 13 cmH20 (11/14/23 0425)  Plateau Pressure: 0 cmH20 (11/14/23 0425)  Total Ve: 14.2 L/m (11/14/23 0425)  Negative Inspiratory Force (cm H2O): 0 (11/14/23 0425)  F/VT Ratio<105 (RSBI): (!) 58.14 (11/14/23 0425)    Lines/Drains/Airways       Central Venous Catheter Line  Duration             Percutaneous Central Line Insertion/Assessment - Triple Lumen  11/13/23 Internal Jugular Right 1 day    Introducer 11/13/23 0750 Internal Jugular Right <1 day              Drain  Duration                  NG/OG Tube 11/13/23 Center mouth 1 day         Urethral Catheter 11/13/23 0715 Silicone;Straight-tip;Temperature probe;Non-latex 14 Fr. <1 day         Y Chest Tube 1 and 2 11/13/23 1415 1 Right Mediastinal 19 Fr. 2 Left Mediastinal 19 Fr. <1 day              Airway  Duration                  Airway - Non-Surgical 11/13/23 0709 <1 day              Arterial Line  Duration             Arterial Line 11/13/23 0749 Left Radial <1 day              Line  Duration                  Pacer Wires 11/13/23 1415 <1 day              Peripheral Intravenous Line  Duration                  Peripheral IV - Single Lumen 11/13/23 16 G Posterior;Right Hand 1 day         Peripheral IV - Single Lumen 11/13/23 0628 20 G Anterior;Right Forearm <1 day                     Significant Labs:    CBC/Anemia Profile:  Recent Labs   Lab 11/13/23  1518 11/13/23  1526 11/14/23  0204 11/14/23  0310 11/14/23  0340   WBC 10.55  --   --   --  10.92   HGB 10.3*  --   --   --  9.4*   HCT 30.1*   < > 27* 26* 26.5*     --   --   --  148*   MCV 91  --   --   --  88   RDW 13.8  --   --   --  14.6*    < > = values in this interval not displayed.        Chemistries:  Recent Labs   Lab 11/13/23  1518 11/14/23  0340    144   K 4.0  4.0 4.2   * 115*   CO2 20* 22*   BUN 18 20   CREATININE 0.9 0.9   CALCIUM 9.1 8.6*   ALBUMIN 2.5* 3.8   PROT 4.6* 5.2*   BILITOT 1.3* 0.8   ALKPHOS 47* 36*   ALT 24 20   * 123*   MG 3.2*  --    PHOS 2.9  --        All pertinent labs within the past 24 hours have been reviewed.    Significant Imaging:  I have reviewed all pertinent imaging results/findings within the past 24 hours.

## 2023-11-14 NOTE — NURSING
Pt HR sustaining 120's. BP tolerating well (120/60's). MD aware. EKG obtain showing accelerated junction rhythm. Pt self converted, then entered rhythm again x3 within 30 minutes. Amiodarone bolus and gtt started.

## 2023-11-14 NOTE — ANESTHESIA POSTPROCEDURE EVALUATION
Anesthesia Post Evaluation    Patient: Kendra Will    Procedure(s) Performed: Procedure(s) (LRB):  REPLACEMENT, AORTIC VALVE, USING ROSS PROCEDURE (N/A)    Final Anesthesia Type: general      Patient location during evaluation: ICU  Patient participation: No - Unable to Participate, Sedation  Level of consciousness: sedated  Post-procedure vital signs: reviewed and stable  Pain management: adequate  Airway patency: patent    PONV status at discharge: No PONV  Anesthetic complications: no      Cardiovascular status: hemodynamically stable  Respiratory status: intubated            Vitals Value Taken Time   BP 97/54 11/14/23 0602   Temp 38.1 °C (100.58 °F) 11/14/23 0642   Pulse 71 11/14/23 0642   Resp 20 11/14/23 0425   SpO2 98 % 11/14/23 0642   Vitals shown include unvalidated device data.      No case tracking events are documented in the log.      Pain/Harman Score: Pain Rating Prior to Med Admin: 8 (11/14/2023  4:25 AM)  Pain Rating Post Med Admin: 3 (11/14/2023  4:55 AM)

## 2023-11-14 NOTE — PLAN OF CARE
Recommendations     Advance diet order to Cardiac as tolerated (fluid restriction per MD).   If PO intake poor >48 hrs, add Boost Plus ONS TID to supplement intake.   RD to monitor & follow-up.     Goals: Meet % EEN, EPN by RD f/u date  Nutrition Goal Status: new  Communication of RD Recs: other (comment) (POC)

## 2023-11-14 NOTE — PROGRESS NOTES
[facility]  Critical Care - Surgery  Progress Note    Patient Name: Kendra Will  MRN: [id number]  Admission Date: 11/13/2023  Hospital Length of Stay: 1 days  Code Status: Full Code  Attending Provider: Nicola Montgomery MD  Primary Care Provider: Janett Gonzalez MD   Principal Problem: S/P aortic valve replacement    Subjective:     Hospital/ICU Course:  No notes on file    Interval History/Significant Events: Patient admitted to SICU yesterday afternoon/evening. Difficulty with extubation due to patient not following commands and with multiple vagal episodes bradying down. This morning she was extubated to nasal cannula.       Follow-up For: Procedure(s) (LRB):  REPLACEMENT, AORTIC VALVE, USING ROSS PROCEDURE (N/A)    Post-Operative Day: 1 Day Post-Op    Objective:     Vital Signs (Most Recent):  Temp: (!) 100.9 °F (38.3 °C) (11/14/23 0425)  Pulse: 80 (11/14/23 0430)  Resp: 20 (11/14/23 0425)  BP: (!) 105/57 (11/14/23 0400)  SpO2: 95 % (11/14/23 0430) Vital Signs (24h Range):  Temp:  [98.2 °F (36.8 °C)-100.9 °F (38.3 °C)] 100.9 °F (38.3 °C)  Pulse:  [64-91] 80  Resp:  [8-28] 20  SpO2:  [95 %-100 %] 95 %  BP: ()/(52-63) 105/57  Arterial Line BP: ()/(44-64) 98/47     Weight: 61.8 kg (136 lb 5.7 oz)  Body mass index is 27.54 kg/m².      Intake/Output Summary (Last 24 hours) at 11/14/2023 0552  Last data filed at 11/14/2023 0400  Gross per 24 hour   Intake 7353.5 ml   Output 1675 ml   Net 5678.5 ml          Physical Exam  Vitals and nursing note reviewed.   Constitutional:       General: She is not in acute distress.     Appearance: She is not ill-appearing or toxic-appearing.   HENT:      Head: Normocephalic and atraumatic.      Right Ear: External ear normal.      Left Ear: External ear normal.      Nose: Nose normal.   Eyes:      General:         Right eye: No discharge.         Left eye: No discharge.   Cardiovascular:      Rate and Rhythm: Normal rate and regular rhythm.       Pulses: Normal pulses.      Heart sounds: Normal heart sounds. No murmur heard.     No gallop.   Pulmonary:      Effort: Pulmonary effort is normal. No accessory muscle usage or respiratory distress.      Breath sounds: Normal breath sounds. No wheezing or rales.   Abdominal:      General: Abdomen is flat. There is no distension.      Palpations: Abdomen is soft.      Tenderness: There is no abdominal tenderness. There is no guarding.   Musculoskeletal:      Right lower leg: No edema.      Left lower leg: No edema.   Skin:     General: Skin is warm and dry.   Neurological:      Mental Status: She is alert and oriented to person, place, and time.      Motor: No weakness.   Psychiatric:         Speech: Speech normal.         Behavior: Behavior is agitated. Behavior is not aggressive or combative.         Judgment: Judgment is impulsive.            Vents:  Vent Mode: Spont (11/14/23 0425)  Set Rate: 26 BPM (11/14/23 0300)  Vt Set: 350 mL (11/14/23 0300)  Pressure Support: 8 cmH20 (11/14/23 0425)  PEEP/CPAP: 5 cmH20 (11/14/23 0425)  Oxygen Concentration (%): 40 (11/14/23 0430)  Peak Airway Pressure: 13 cmH20 (11/14/23 0425)  Plateau Pressure: 0 cmH20 (11/14/23 0425)  Total Ve: 14.2 L/m (11/14/23 0425)  Negative Inspiratory Force (cm H2O): 0 (11/14/23 0425)  F/VT Ratio<105 (RSBI): (!) 58.14 (11/14/23 0425)    Lines/Drains/Airways       Central Venous Catheter Line  Duration             Percutaneous Central Line Insertion/Assessment - Triple Lumen  11/13/23 Internal Jugular Right 1 day    Introducer 11/13/23 0750 Internal Jugular Right <1 day              Drain  Duration                  NG/OG Tube 11/13/23 Center mouth 1 day         Urethral Catheter 11/13/23 0715 Silicone;Straight-tip;Temperature probe;Non-latex 14 Fr. <1 day         Y Chest Tube 1 and 2 11/13/23 1415 1 Right Mediastinal 19 Fr. 2 Left Mediastinal 19 Fr. <1 day              Airway  Duration                  Airway - Non-Surgical 11/13/23 0709 <1 day               Arterial Line  Duration             Arterial Line 11/13/23 0749 Left Radial <1 day              Line  Duration                  Pacer Wires 11/13/23 1415 <1 day              Peripheral Intravenous Line  Duration                  Peripheral IV - Single Lumen 11/13/23 16 G Posterior;Right Hand 1 day         Peripheral IV - Single Lumen 11/13/23 0628 20 G Anterior;Right Forearm <1 day                    Significant Labs:    CBC/Anemia Profile:  Recent Labs   Lab 11/13/23  1518 11/13/23  1526 11/14/23  0204 11/14/23  0310 11/14/23  0340   WBC 10.55  --   --   --  10.92   HGB 10.3*  --   --   --  9.4*   HCT 30.1*   < > 27* 26* 26.5*     --   --   --  148*   MCV 91  --   --   --  88   RDW 13.8  --   --   --  14.6*    < > = values in this interval not displayed.        Chemistries:  Recent Labs   Lab 11/13/23  1518 11/14/23  0340    144   K 4.0  4.0 4.2   * 115*   CO2 20* 22*   BUN 18 20   CREATININE 0.9 0.9   CALCIUM 9.1 8.6*   ALBUMIN 2.5* 3.8   PROT 4.6* 5.2*   BILITOT 1.3* 0.8   ALKPHOS 47* 36*   ALT 24 20   * 123*   MG 3.2*  --    PHOS 2.9  --        All pertinent labs within the past 24 hours have been reviewed.    Significant Imaging:  I have reviewed all pertinent imaging results/findings within the past 24 hours.  Assessment/Plan:     Cardiac/Vascular  * S/P aortic valve replacement  Neuro/Psych:   - Sedation: none  - Pain:    - Scheduled Tylenol 1000mg Q8H  - PRN Oxycodone 5mg/10mg  - PRN hydromorphone 0.5mg       - We will start a PCA today, discontinue PRN opioids  - Psych: alert, awake, agitated  - She has a history of bipolar, anxiety, depression. She is prescribed risperidone, but she reports she does not take this medication. We will follow-up with our pharmacists to find out what medications are being filled.             Cardiac:   - s/p Ross procedure for bicuspid aortic valve and PFO closure with Dr. Montgomery  - Postoperative MONCHO: mild right ventricular dysfunction  with normal LV function  - BP Goal: MAP>60 mmHg, SBP<120 mmHg  - Inotropes/Pressors: Milrinone 0.375, Epinephrine 0.02 - continue to wean vasopressor support.  - Anti-HTNs: Clevidipine PRN  - Rhythm: NSR, rate 70s  - Beta Blocker: Start per CTS  - Statin: Start per CTS  -       Pulmonary:   - Extubated this morning to nasal cannula  - Goal SpO2 >92%  - Wean supplemental oxygen as tolerated  - PRN albuterol for wheezing or SOB  - Patient is prescribed Advair, however she reports she does not use this  - Continue IS  - Chest Tubes x 2 (2 Meds)  - ABGs Q1H x 6H, Q3H x 3  - ABGS PRN      Renal  - Trend BUN/Cr   - Maintain Crooks, record strict Is/Os  - Monitor UOP 1430cc/24H, Net +5678cc/24H    Recent Labs   Lab 11/08/23 0846 11/13/23  1518 11/14/23  0340   BUN 19 18 20   CREATININE 0.9 0.9 0.9          FEN / GI:   - Daily CMP, PRN K/Mag/Phos per protocol   - Replace electrolytes as needed  - Nutrition: NPO pending bedside swallow, advance to cardiac diet fluid restriction  - Bowel Regimen: Miralax, docusate     ID:   - Tmax: 101.5 early this morning  - She is febrile without a leukocytosis. Likely inflammatory in setting of recent surgical stress. Will continue to monitor.   - Abx: Complete perioperative cefazolin 2g Q8H x 5 doses  - She has a history of HIV. She reports compliance with her antivirals. I will speak with our pharmacist on availability and reorder when appropriate.     Recent Labs   Lab 11/08/23  0846 11/13/23  1518 11/14/23  0340   WBC 5.46 10.55 10.92          Heme/Onc:   - Hgb 14.3 pre-operatively, stable 9.4 postoperatively  - Received 2 units of PRBCs, 950cc Cell Saver intraoperatively  - CBC daily  - ASA 325mg daily, will start pending chest tube output    Recent Labs   Lab 11/08/23  0846 11/13/23  1518 11/14/23  0340   HGB 14.3 10.3* 9.4*    159 148*   APTT 25.8 31.3 27.2   INR 1.0 1.1 1.1          Endocrine:   - CTS Goal -140  - HgbA1c:   - Endocrinology consulted for insulin  management     PPx:   Feeding: NPO, will advance as tolerated to cardiac diet  Analgesia/Sedation:   Thromboembolic Prevention: Enoxaparin, start POD1  HOB >30: Yes  Stress Ulcer: Not indicated, famotidine PO per CTS  Glucose Control: Yes, insulin management per Endocrinology     Lines/Drains/Airway:   Art Line   Central Line   Crooks   Chest Tubes: 2 mediastinal   Pacing Wires: Temporary ventricular pacing wires     Dispo/Code Status/Palliative:   - Continue SICU Care  - Full Code        Critical secondary to Patient has a condition that poses threat to life and bodily function: Major Cardiac Surgery     Critical care was time spent personally by me on the following activities: development of treatment plan with patient or surrogate and bedside caregivers, discussions with consultants, evaluation of patient's response to treatment, examination of patient, ordering and performing treatments and interventions, ordering and review of laboratory studies, ordering and review of radiographic studies, pulse oximetry, re-evaluation of patient's condition.  This critical care time did not overlap with that of any other provider or involve time for any procedures.     Elver Jones, DO  Critical Care - Surgery  [facility]

## 2023-11-14 NOTE — NURSING
SICU PLAN OF CARE NOTE    Dx: S/P aortic valve replacement    Goals of Care:   MAP > 60  SBP <120    Vital Signs (last 12 hours):   Temp:  [99.1 °F (37.3 °C)-101.5 °F (38.6 °C)]   Pulse:  [69-80]   Resp:  [20-28]   BP: ()/(48-59)   SpO2:  [92 %-100 %]   Arterial Line BP: ()/(31-55)      Neuro: AAO x4, Arouses to Voice, Follows Commands, and Moves All Extremities    Cardiac: NSR. S1, S2.    Respiratory: Nasal Cannula 4L    Gtts: Epinephrine , Milrinone, and Insulin    Urine Output: Urinary Catheter 540 cc/shift    Drains: Chest Tube, total output 950 cc /  shift    Diet: NPO     Labs/Accuchecks: Daily labs.    Skin: No altered skin integrity. Drains/ devices see flowsheets for details.     Shift Events: Gtts titrated according to orders. 500 albumin given, x3. 50 meq of bicarb given. Pt agitation increased throughout the night, pt given redirection. Pain treated with PRN dilaudid. Hypotensive period prior to extubation, additional 250 of albumin. Pt extubated to nasal cannula, tolerating well.

## 2023-11-14 NOTE — ASSESSMENT & PLAN NOTE
Neuro/Psych:   - Sedation: none  - Pain:  Difficult to control, her psychiatric history complicates the picture. She is intermittently somnolent and slow to respond, however she moans she is in pain and yells when the NIBP cuff inflates.  - Scheduled acetaminophen   - She fills flexeril regularly, we will use methocarbamol inpatient as it may be slightly less sedating for her      - Continue the hydromorphone PCA, she has received 9mg in the past 24 hours  - Psych: intermittently somnolent, awake. Slowed behavior.   - She has a history of bipolar, anxiety, depression. She is prescribed risperidone, but she reports she does not take this medication.   - Continue risperidone 1mg bid            Cardiac:   - s/p Ross procedure for bicuspid aortic valve and PFO closure with Dr. Montgomery  - Postoperative MONCHO: mild right ventricular dysfunction with normal LV function  - BP Goal: MAP>60 mmHg, SBP<120 mmHg  - Inotropes/Pressors: Milrinone 0.250, Vaso 0.04, Epinephrine 0.02 - continue to wean vasopressor support.  - Anti-HTNs: Clevidipine PRN  - Rhythm: NSR, rate 70s  - Beta Blocker: Start per CTS  - Statin: Start per CTS  -    - Had multiple episodes of atrial flutter vs. junctional rhythm yesterday with HR in 140s. Started on amiodarone infusion. Will continue today.      Pulmonary:   - Nasal cannula with ETCO2 monitoring for PCA  - Goal SpO2 >92%  - Wean supplemental oxygen as tolerated  - PRN albuterol for wheezing or SOB  - Patient is prescribed Advair, however she reports she does not use this  - We will substitute Breo while she is inpatient  - Continue IS  - Chest Tubes x 2 (2 Meds) - output 310 past 24 hours  - ABGs Q1H x 6H, Q3H x 3  - ABGS PRN      Renal  - Trend BUN/Cr   - Maintain Crooks, record strict Is/Os  - Monitor UOP 825cc/24H, Net +781cc/24H  - Started furosemide infusion, max dose 30mg/hr  - Titrate lasix gtt to UOP goal   - She has an ASTON today BUN 35/1.6  - We will continue to monitor  closely     Recent Labs   Lab 11/13/23  1518 11/14/23  0340 11/15/23  0211   BUN 18 20 35*   CREATININE 0.9 0.9 1.6*        FEN / GI:   - Daily CMP, PRN K/Mag/Phos per protocol   - Replace electrolytes as needed  - Nutrition: Cardiac diet, fluid restriction  - Bowel Regimen: Miralax, docusate     ID:   - Tmax: 101.5 early 11/14 morning, fever curve downtrend  - She was febrile without a leukocytosis. Likely inflammatory in setting of recent surgical stress. Will continue to monitor.   - Abx: Complete perioperative cefazolin 2g Q8H x 5 doses  - She has a history of HIV. She reports compliance with her antivirals. I will speak with our pharmacist on availability and reorder when appropriate.     Recent Labs   Lab 11/08/23  0846 11/13/23  1518 11/14/23  0340   WBC 5.46 10.55 10.92          Heme/Onc:   - Hgb 14.3 pre-operatively, slight downtrend but stable overall postoperatively  - Received 2 units of PRBCs, 950cc Cell Saver intraoperatively  - CBC daily  - ASA 325mg daily, will start pending chest tube output    Recent Labs   Lab 11/13/23  1518 11/14/23  0340 11/15/23  0211   HGB 10.3* 9.4* 8.6*    148* 107*   APTT 31.3 27.2 38.7*   INR 1.1 1.1 1.3*        Endocrine:   - CTS Goal -140  - HgbA1c:   - Endocrinology consulted for insulin management     PPx:   Feeding: Cardiac diet, fluid restriction  Analgesia/Sedation:   Thromboembolic Prevention: Heparin 5000 tid  HOB >30: Yes  Stress Ulcer: Not indicated, famotidine PO per CTS  Glucose Control: Yes, insulin management per Endocrinology     Lines/Drains/Airway:   Art Line   Central Line   Crooks   Chest Tubes: 2 mediastinal   Pacing Wires: Temporary ventricular pacing wires     Dispo/Code Status/Palliative:   - Continue SICU Care  - Full Code

## 2023-11-14 NOTE — PT/OT/SLP PROGRESS
Physical Therapy      Patient Name:  Kendra Will   MRN:  [id number]    Patient not seen today. Pt remained intubated in AM. Pt extubated but pt's BP is on the borderline of the goal (MAP>60). RN reported that pt has uncontrolled pain when  awake.  Will follow-up when appropriate.

## 2023-11-14 NOTE — PLAN OF CARE
[facility]  Initial Discharge Assessment       Primary Care Provider: Janett Gonzalez MD    Admission Diagnosis: Nonrheumatic aortic valve stenosis [I35.0]  Bicuspid aortic valve [Q23.1]  Aortic stenosis [I35.0]    Admission Date: 11/13/2023  Expected Discharge Date: 11/17/2023    Transition of Care Barriers: None    Payor: MEDICAID / Plan: [employer] [facility] / Product Type: Managed Medicaid /     Extended Emergency Contact Information  Primary Emergency Contact: Pamela Will   [place]  Mobile Phone: [phone]  Relation: Mother    Discharge Plan A: Home, Home with family  Discharge Plan B: Home with family, Home Health      [facility] - [address]  [address]  [place] [address]  Phone: [phone] Fax: [phone]    [facility] [place]  [place] - [address]  [address]  [place] [address]  Phone: [phone] Fax: [phone]      Initial Assessment (most recent)       Adult Discharge Assessment - 11/14/23 1213          Discharge Assessment    Assessment Type Discharge Planning Assessment     Confirmed/corrected address, phone number and insurance Yes     Confirmed Demographics Correct on Facesheet     Source of Information patient     When was your last doctors appointment? 11/08/23     Communicated TATIANA with patient/caregiver Date not available/Unable to determine     People in Home alone   PATIENT WILL DISCHARGE TO HER PARENTS ADDRESS [address] [place]    Do you have help at home or someone to help you manage your care at home? Yes     Prior to hospitilization cognitive status: No Deficits     Current cognitive status: Unable to Assess     Home Layout Able to live on 1st floor     Equipment Currently Used at Home none     Readmission within 30 days? No     Patient currently being followed by outpatient case management? No     Do you currently have service(s) that help you manage your care at  home? No     Do you take prescription medications? Yes     Do you have prescription coverage? Yes     Do you have any problems affording any of your prescribed medications? No     Is the patient taking medications as prescribed? yes     Who is going to help you get home at discharge? FAMILY     How do you get to doctors appointments? car, drives self;family or friend will provide     Are you on dialysis? No     Do you take coumadin? No     DME Needed Upon Discharge  none     Discharge Plan discussed with: Parent(s);Patient     Transition of Care Barriers None     Discharge Plan A Home;Home with family     Discharge Plan B Home with family;Home Health        Physical Activity    On average, how many days per week do you engage in moderate to strenuous exercise (like a brisk walk)? 0 days     On average, how many minutes do you engage in exercise at this level? 0 min        Financial Resource Strain    How hard is it for you to pay for the very basics like food, housing, medical care, and heating? Not hard at all        Housing Stability    In the last 12 months, was there a time when you were not able to pay the mortgage or rent on time? No     In the last 12 months, was there a time when you did not have a steady place to sleep or slept in a shelter (including now)? No        Transportation Needs    In the past 12 months, has lack of transportation kept you from medical appointments or from getting medications? No     In the past 12 months, has lack of transportation kept you from meetings, work, or from getting things needed for daily living? No        Food Insecurity    Within the past 12 months, you worried that your food would run out before you got the money to buy more. Never true     Within the past 12 months, the food you bought just didn't last and you didn't have money to get more. Never true        Stress    Do you feel stress - tense, restless, nervous, or anxious, or unable to sleep at night because  your mind is troubled all the time - these days? Patient refused        Social Connections    In a typical week, how many times do you talk on the phone with family, friends, or neighbors? Twice a week     How often do you get together with friends or relatives? Once a week     How often do you attend Jain or Christian services? 1 to 4 times per year     Do you belong to any clubs or organizations such as Jain groups, unions, fraternal or athletic groups, or school groups? No     How often do you attend meetings of the clubs or organizations you belong to? Never     Are you , , , , never , or living with a partner? Never         Alcohol Use    Q1: How often do you have a drink containing alcohol? Patient refused     Q2: How many drinks containing alcohol do you have on a typical day when you are drinking? Patient refused     Q3: How often do you have six or more drinks on one occasion? Patient refused                   Discharge Plan A HOME WITH FAMILYand Plan B HOME WITH FAMILY have been determined by review of patient's clinical status, future medical and therapeutic needs, and coverage/benefits for post-acute care in coordination with multidisciplinary team members.  Patient lives alone in a [address] [place] [address] hospital she is not on dialysis and does not take coumadin she ahs a ride home

## 2023-11-14 NOTE — ASSESSMENT & PLAN NOTE
BG goal 110-140 (CTS protocol)   No hx of DM. BG stable post surgery. Will switch to transition drip.  Diet advanced to CLD      -discontinue IV insulin infusion protocol  - Start transition drip at 0.8 u/ hr w/ stepdown parameters  - Start [facility] 150/25  -POCT glucose AC/HS/0200    ** Please call Endocrine for any BG related issues **    Discharge planning: TBD

## 2023-11-14 NOTE — PLAN OF CARE
SICU PLAN OF CARE NOTE    Dx: S/P aortic valve replacement    Goals of Care: MAP >60. SBP <120    Vital Signs (last 12 hours):   Temp:  [97.2 °F (36.2 °C)-101.5 °F (38.6 °C)]   Pulse:  [62-82]   Resp:  [20-28]   BP: ()/(48-59)   SpO2:  [90 %-100 %]   Arterial Line BP: ()/(31-74)      Neuro: AAO x4, Follows Commands, and Moves All Extremities    Cardiac: NSR     Respiratory: Nasal Cannula    Gtts: Epinephrine , Vasopressin, Milrinone, Insulin, and PCA     Urine Output: Urinary Catheter 230 cc/shift    Drains: Chest Tube, total output 220 cc /  shift    Diet: Clear Liquid    Labs/Accuchecks: Daily Labs. Q4H Accuchecks.    Skin: No new breakdown noted. Pt assisted with weight shifting as needed. Foams in place. SCD's on.     Shift Events: Pain management discussed with team. PCA started and Robaxin TID. Weaning off Epi gtt. Vasopressin gtt added. Insulin gtt transitioned to stepdown. Low UOP, lasix gtt started. Rhythm change at end of shift. Amio bolus and gtt started.

## 2023-11-14 NOTE — NURSING
CTS Resident notified about low urine output. Crooks flushed with 100cc NS. Urine output remains ~5cc/hour. MD Montgomery notified. No new orders at this time.

## 2023-11-14 NOTE — NURSING
Pt c/o itching on bilateral upper extremities. No rash present. NP at bedside to assess; itching likely due top PCA pain medications. If itching becomes intolerable, Atarax may be given.

## 2023-11-14 NOTE — CONSULTS
"  [facility]  Adult Nutrition  Consult Note    SUMMARY     Recommendations    Advance diet order to Cardiac as tolerated (fluid restriction per MD).   If PO intake poor >48 hrs, add Boost Plus ONS TID to supplement intake.   RD to monitor & follow-up.    Goals: Meet % EEN, EPN by RD f/u date  Nutrition Goal Status: new  Communication of RD Recs: other (comment) (POC)    Assessment and Plan    Nutrition Problem:  Increased nutrient needs    Related to (etiology):   Physiological causes     Signs and Symptoms (as evidenced by):   S/p cardiac surgery     Interventions(treatment strategy):  Collaboration of nutrition care w/ other providers  ADAT    Nutrition Diagnosis Status:   New    Reason for Assessment    Reason For Assessment: consult  Diagnosis: other (see comments) (S/p aortic valve replacement)  Relevant Medical History: HIV  Interdisciplinary Rounds: did not attend    General Information Comments: Extubated yesterday, diet advanced to clear liquids this AM. S/p AVR. Per chart review, pt with no significant wt loss PTA (UBW: 130-140#). Pt appears nourished; NFPE not warranted.   Nutrition Discharge Planning: Adequate nutrition    Nutrition/Diet History    Spiritual, Cultural Beliefs, Orthodoxy Practices, Values that Affect Care: no  Factors Affecting Nutritional Intake: clear liquid diet    Anthropometrics    Temp: 99.5 °F (37.5 °C)  Height Method: Stated  Height: 4' 11" (149.9 cm)  Height (inches): 59 in  Weight: 61.8 kg (136 lb 3.9 oz)  Weight (lb): 136.25 lb  Ideal Body Weight (IBW), Female: 95 lb  % Ideal Body Weight, Female (lb): 143.42 %  BMI (Calculated): 27.5  BMI Grade: 25 - 29.9 - overweight    Lab/Procedures/Meds    Pertinent Labs Reviewed: reviewed  Pertinent Labs Comments: Cleviprex, Precedex, Epi, Insulin  Pertinent Medications Reviewed: reviewed    Estimated/Assessed Needs    Weight Used For Calorie Calculations: 61.8 kg (136 lb 3.9 oz)    Energy Calorie Requirements " (kcal): 1436 kcal/d  Energy Need Method: [place]-St Jeor (1.25 PAL)    Protein Requirements: 74 g/d (1.2 g/kg)  Weight Used For Protein Calculations: 61.8 kg (136 lb 3.9 oz)    Estimated Fluid Requirement Method: other (see comments) (Per MD or 1 mL/kcal)  RDA Method (mL): 1436    Nutrition Prescription Ordered    Current Diet Order: Clear liquids    Evaluation of Received Nutrient/Fluid Intake    I/O: +6.2L since admit    Comments: LBM: 11/12    Nutrition Risk    Level of Risk/Frequency of Follow-up:  (1x/week)     Monitor and Evaluation    Food and Nutrient Intake: energy intake, food and beverage intake  Food and Nutrient Adminstration: diet order  Physical Activity and Function: nutrition-related ADLs and IADLs  Anthropometric Measurements: weight, weight change  Biochemical Data, Medical Tests and Procedures: glucose/endocrine profile, lipid profile, inflammatory profile, gastrointestinal profile, electrolyte and renal panel  Nutrition-Focused Physical Findings: overall appearance     Nutrition Follow-Up    RD Follow-up?: Yes

## 2023-11-15 LAB
ALBUMIN SERPL BCP-MCNC: 3.5 G/DL (ref 3.5–5.2)
ALBUMIN SERPL BCP-MCNC: 3.6 G/DL (ref 3.5–5.2)
ALLENS TEST: ABNORMAL
ALLENS TEST: NORMAL
ALP SERPL-CCNC: 44 U/L (ref 55–135)
ALP SERPL-CCNC: 46 U/L (ref 55–135)
ALT SERPL W/O P-5'-P-CCNC: 36 U/L (ref 10–44)
ALT SERPL W/O P-5'-P-CCNC: 41 U/L (ref 10–44)
ANION GAP SERPL CALC-SCNC: 10 MMOL/L (ref 8–16)
ANION GAP SERPL CALC-SCNC: 11 MMOL/L (ref 8–16)
APTT PPP: 38.7 SEC (ref 21–32)
AST SERPL-CCNC: 125 U/L (ref 10–40)
AST SERPL-CCNC: 131 U/L (ref 10–40)
BASOPHILS # BLD AUTO: 0.02 K/UL (ref 0–0.2)
BASOPHILS NFR BLD: 0.2 % (ref 0–1.9)
BILIRUB SERPL-MCNC: 0.4 MG/DL (ref 0.1–1)
BILIRUB SERPL-MCNC: 0.5 MG/DL (ref 0.1–1)
BLD PROD TYP BPU: NORMAL
BLD PROD TYP BPU: NORMAL
BLOOD UNIT EXPIRATION DATE: NORMAL
BLOOD UNIT EXPIRATION DATE: NORMAL
BLOOD UNIT TYPE CODE: 6200
BLOOD UNIT TYPE CODE: 6200
BLOOD UNIT TYPE: NORMAL
BLOOD UNIT TYPE: NORMAL
BUN SERPL-MCNC: 35 MG/DL (ref 6–20)
BUN SERPL-MCNC: 39 MG/DL (ref 6–20)
CALCIUM SERPL-MCNC: 8.2 MG/DL (ref 8.7–10.5)
CALCIUM SERPL-MCNC: 8.4 MG/DL (ref 8.7–10.5)
CHLORIDE SERPL-SCNC: 104 MMOL/L (ref 95–110)
CHLORIDE SERPL-SCNC: 108 MMOL/L (ref 95–110)
CO2 SERPL-SCNC: 18 MMOL/L (ref 23–29)
CO2 SERPL-SCNC: 19 MMOL/L (ref 23–29)
CODING SYSTEM: NORMAL
CODING SYSTEM: NORMAL
CREAT SERPL-MCNC: 1.6 MG/DL (ref 0.5–1.4)
CREAT SERPL-MCNC: 1.6 MG/DL (ref 0.5–1.4)
CROSSMATCH INTERPRETATION: NORMAL
CROSSMATCH INTERPRETATION: NORMAL
DIFFERENTIAL METHOD: ABNORMAL
DISPENSE STATUS: NORMAL
DISPENSE STATUS: NORMAL
EOSINOPHIL # BLD AUTO: 0 K/UL (ref 0–0.5)
EOSINOPHIL NFR BLD: 0 % (ref 0–8)
ERYTHROCYTE [DISTWIDTH] IN BLOOD BY AUTOMATED COUNT: 15.1 % (ref 11.5–14.5)
ERYTHROCYTE [DISTWIDTH] IN BLOOD BY AUTOMATED COUNT: 15.3 % (ref 11.5–14.5)
EST. GFR  (NO RACE VARIABLE): 39.3 ML/MIN/1.73 M^2
EST. GFR  (NO RACE VARIABLE): 39.3 ML/MIN/1.73 M^2
GLUCOSE SERPL-MCNC: 112 MG/DL (ref 70–110)
GLUCOSE SERPL-MCNC: 153 MG/DL (ref 70–110)
GLUCOSE SERPL-MCNC: 154 MG/DL (ref 70–110)
GLUCOSE SERPL-MCNC: 206 MG/DL (ref 70–110)
GLUCOSE SERPL-MCNC: 211 MG/DL (ref 70–110)
HCO3 UR-SCNC: 19.2 MMOL/L (ref 24–28)
HCO3 UR-SCNC: 20.3 MMOL/L (ref 24–28)
HCO3 UR-SCNC: 22.6 MMOL/L (ref 24–28)
HCO3 UR-SCNC: 23.8 MMOL/L (ref 24–28)
HCT VFR BLD AUTO: 25 % (ref 37–48.5)
HCT VFR BLD AUTO: 25.7 % (ref 37–48.5)
HCT VFR BLD CALC: 22 %PCV (ref 36–54)
HCT VFR BLD CALC: 23 %PCV (ref 36–54)
HCT VFR BLD CALC: 23 %PCV (ref 36–54)
HCT VFR BLD CALC: 31 %PCV (ref 36–54)
HGB BLD-MCNC: 8.3 G/DL (ref 12–16)
HGB BLD-MCNC: 8.6 G/DL (ref 12–16)
IMM GRANULOCYTES # BLD AUTO: 0.14 K/UL (ref 0–0.04)
IMM GRANULOCYTES NFR BLD AUTO: 1.1 % (ref 0–0.5)
INR PPP: 1.3 (ref 0.8–1.2)
LDH SERPL L TO P-CCNC: 0.97 MMOL/L (ref 0.36–1.25)
LYMPHOCYTES # BLD AUTO: 1.4 K/UL (ref 1–4.8)
LYMPHOCYTES NFR BLD: 10.7 % (ref 18–48)
MAGNESIUM SERPL-MCNC: 2.3 MG/DL (ref 1.6–2.6)
MCH RBC QN AUTO: 30.6 PG (ref 27–31)
MCH RBC QN AUTO: 31.5 PG (ref 27–31)
MCHC RBC AUTO-ENTMCNC: 33.2 G/DL (ref 32–36)
MCHC RBC AUTO-ENTMCNC: 33.5 G/DL (ref 32–36)
MCV RBC AUTO: 92 FL (ref 82–98)
MCV RBC AUTO: 94 FL (ref 82–98)
MONOCYTES # BLD AUTO: 1 K/UL (ref 0.3–1)
MONOCYTES NFR BLD: 8.1 % (ref 4–15)
NEUTROPHILS # BLD AUTO: 10.1 K/UL (ref 1.8–7.7)
NEUTROPHILS NFR BLD: 79.9 % (ref 38–73)
NRBC BLD-RTO: 0 /100 WBC
NUM UNITS TRANS PACKED RBC: NORMAL
NUM UNITS TRANS PACKED RBC: NORMAL
PCO2 BLDA: 32.8 MMHG (ref 35–45)
PCO2 BLDA: 35.8 MMHG (ref 35–45)
PCO2 BLDA: 40.8 MMHG (ref 35–45)
PCO2 BLDA: 40.8 MMHG (ref 35–45)
PH SMN: 7.3 [PH] (ref 7.35–7.45)
PH SMN: 7.37 [PH] (ref 7.35–7.45)
PH SMN: 7.38 [PH] (ref 7.35–7.45)
PH SMN: 7.41 [PH] (ref 7.35–7.45)
PHOSPHATE SERPL-MCNC: 5.3 MG/DL (ref 2.7–4.5)
PLATELET # BLD AUTO: 107 K/UL (ref 150–450)
PLATELET # BLD AUTO: 123 K/UL (ref 150–450)
PMV BLD AUTO: 11.9 FL (ref 9.2–12.9)
PMV BLD AUTO: 12 FL (ref 9.2–12.9)
PO2 BLDA: 230 MMHG (ref 80–100)
PO2 BLDA: 412 MMHG (ref 80–100)
PO2 BLDA: 470 MMHG (ref 80–100)
PO2 BLDA: 73 MMHG (ref 80–100)
POC BE: -1 MMOL/L
POC BE: -2 MMOL/L
POC BE: -6 MMOL/L
POC BE: -6 MMOL/L
POC IONIZED CALCIUM: 1.06 MMOL/L (ref 1.06–1.42)
POC IONIZED CALCIUM: 1.12 MMOL/L (ref 1.06–1.42)
POC IONIZED CALCIUM: 1.19 MMOL/L (ref 1.06–1.42)
POC IONIZED CALCIUM: 1.29 MMOL/L (ref 1.06–1.42)
POC SATURATED O2: 100 % (ref 95–100)
POC SATURATED O2: 93 % (ref 95–100)
POC TCO2: 20 MMOL/L (ref 23–27)
POC TCO2: 22 MMOL/L (ref 23–27)
POC TCO2: 24 MMOL/L (ref 23–27)
POC TCO2: 25 MMOL/L (ref 23–27)
POCT GLUCOSE: 144 MG/DL (ref 70–110)
POCT GLUCOSE: 158 MG/DL (ref 70–110)
POTASSIUM BLD-SCNC: 3.5 MMOL/L (ref 3.5–5.1)
POTASSIUM BLD-SCNC: 3.6 MMOL/L (ref 3.5–5.1)
POTASSIUM BLD-SCNC: 3.6 MMOL/L (ref 3.5–5.1)
POTASSIUM BLD-SCNC: 5.1 MMOL/L (ref 3.5–5.1)
POTASSIUM SERPL-SCNC: 4.3 MMOL/L (ref 3.5–5.1)
POTASSIUM SERPL-SCNC: 5.1 MMOL/L (ref 3.5–5.1)
POTASSIUM SERPL-SCNC: 5.4 MMOL/L (ref 3.5–5.1)
PROT SERPL-MCNC: 5.4 G/DL (ref 6–8.4)
PROT SERPL-MCNC: 5.5 G/DL (ref 6–8.4)
PROTHROMBIN TIME: 13.5 SEC (ref 9–12.5)
RBC # BLD AUTO: 2.71 M/UL (ref 4–5.4)
RBC # BLD AUTO: 2.73 M/UL (ref 4–5.4)
SAMPLE: ABNORMAL
SAMPLE: NORMAL
SITE: ABNORMAL
SITE: NORMAL
SODIUM BLD-SCNC: 136 MMOL/L (ref 136–145)
SODIUM BLD-SCNC: 139 MMOL/L (ref 136–145)
SODIUM BLD-SCNC: 143 MMOL/L (ref 136–145)
SODIUM BLD-SCNC: 144 MMOL/L (ref 136–145)
SODIUM SERPL-SCNC: 134 MMOL/L (ref 136–145)
SODIUM SERPL-SCNC: 136 MMOL/L (ref 136–145)
WBC # BLD AUTO: 11.75 K/UL (ref 3.9–12.7)
WBC # BLD AUTO: 12.68 K/UL (ref 3.9–12.7)

## 2023-11-15 PROCEDURE — 84100 ASSAY OF PHOSPHORUS: CPT | Performed by: STUDENT IN AN ORGANIZED HEALTH CARE EDUCATION/TRAINING PROGRAM

## 2023-11-15 PROCEDURE — 80053 COMPREHEN METABOLIC PANEL: CPT | Mod: 91

## 2023-11-15 PROCEDURE — 97165 OT EVAL LOW COMPLEX 30 MIN: CPT

## 2023-11-15 PROCEDURE — 85610 PROTHROMBIN TIME: CPT | Performed by: STUDENT IN AN ORGANIZED HEALTH CARE EDUCATION/TRAINING PROGRAM

## 2023-11-15 PROCEDURE — 99291 CRITICAL CARE FIRST HOUR: CPT | Mod: ,,, | Performed by: ANESTHESIOLOGY

## 2023-11-15 PROCEDURE — 37799 UNLISTED PX VASCULAR SURGERY: CPT

## 2023-11-15 PROCEDURE — 97161 PT EVAL LOW COMPLEX 20 MIN: CPT

## 2023-11-15 PROCEDURE — 85027 COMPLETE CBC AUTOMATED: CPT | Performed by: STUDENT IN AN ORGANIZED HEALTH CARE EDUCATION/TRAINING PROGRAM

## 2023-11-15 PROCEDURE — 83735 ASSAY OF MAGNESIUM: CPT | Performed by: STUDENT IN AN ORGANIZED HEALTH CARE EDUCATION/TRAINING PROGRAM

## 2023-11-15 PROCEDURE — 80053 COMPREHEN METABOLIC PANEL: CPT | Performed by: STUDENT IN AN ORGANIZED HEALTH CARE EDUCATION/TRAINING PROGRAM

## 2023-11-15 PROCEDURE — 63600175 PHARM REV CODE 636 W HCPCS: Performed by: STUDENT IN AN ORGANIZED HEALTH CARE EDUCATION/TRAINING PROGRAM

## 2023-11-15 PROCEDURE — 99900035 HC TECH TIME PER 15 MIN (STAT)

## 2023-11-15 PROCEDURE — 25000003 PHARM REV CODE 250

## 2023-11-15 PROCEDURE — 20000000 HC ICU ROOM

## 2023-11-15 PROCEDURE — 82803 BLOOD GASES ANY COMBINATION: CPT

## 2023-11-15 PROCEDURE — 84132 ASSAY OF SERUM POTASSIUM: CPT

## 2023-11-15 PROCEDURE — 99291 PR CRITICAL CARE, E/M 30-74 MINUTES: ICD-10-PCS | Mod: ,,, | Performed by: ANESTHESIOLOGY

## 2023-11-15 PROCEDURE — 83605 ASSAY OF LACTIC ACID: CPT

## 2023-11-15 PROCEDURE — 82800 BLOOD PH: CPT

## 2023-11-15 PROCEDURE — 63600175 PHARM REV CODE 636 W HCPCS

## 2023-11-15 PROCEDURE — 99232 SBSQ HOSP IP/OBS MODERATE 35: CPT | Mod: ,,, | Performed by: PHYSICIAN ASSISTANT

## 2023-11-15 PROCEDURE — 85730 THROMBOPLASTIN TIME PARTIAL: CPT | Performed by: STUDENT IN AN ORGANIZED HEALTH CARE EDUCATION/TRAINING PROGRAM

## 2023-11-15 PROCEDURE — 85025 COMPLETE CBC W/AUTO DIFF WBC: CPT

## 2023-11-15 PROCEDURE — 82565 ASSAY OF CREATININE: CPT

## 2023-11-15 PROCEDURE — 84132 ASSAY OF SERUM POTASSIUM: CPT | Performed by: STUDENT IN AN ORGANIZED HEALTH CARE EDUCATION/TRAINING PROGRAM

## 2023-11-15 PROCEDURE — 27100171 HC OXYGEN HIGH FLOW UP TO 24 HOURS

## 2023-11-15 PROCEDURE — 97530 THERAPEUTIC ACTIVITIES: CPT

## 2023-11-15 PROCEDURE — 94761 N-INVAS EAR/PLS OXIMETRY MLT: CPT | Mod: XB

## 2023-11-15 PROCEDURE — 25000003 PHARM REV CODE 250: Performed by: STUDENT IN AN ORGANIZED HEALTH CARE EDUCATION/TRAINING PROGRAM

## 2023-11-15 PROCEDURE — 97112 NEUROMUSCULAR REEDUCATION: CPT

## 2023-11-15 PROCEDURE — 82330 ASSAY OF CALCIUM: CPT

## 2023-11-15 PROCEDURE — 63600175 PHARM REV CODE 636 W HCPCS: Performed by: PHYSICIAN ASSISTANT

## 2023-11-15 PROCEDURE — 85014 HEMATOCRIT: CPT

## 2023-11-15 PROCEDURE — 94660 CPAP INITIATION&MGMT: CPT

## 2023-11-15 PROCEDURE — 99232 PR SUBSEQUENT HOSPITAL CARE,LEVL II: ICD-10-PCS | Mod: ,,, | Performed by: PHYSICIAN ASSISTANT

## 2023-11-15 PROCEDURE — 27000221 HC OXYGEN, UP TO 24 HOURS

## 2023-11-15 RX ORDER — DULOXETIN HYDROCHLORIDE 60 MG/1
60 CAPSULE, DELAYED RELEASE ORAL DAILY
Status: DISCONTINUED | OUTPATIENT
Start: 2023-11-15 | End: 2023-11-19 | Stop reason: HOSPADM

## 2023-11-15 RX ORDER — HEPARIN SODIUM 5000 [USP'U]/ML
5000 INJECTION, SOLUTION INTRAVENOUS; SUBCUTANEOUS EVERY 8 HOURS
Status: DISCONTINUED | OUTPATIENT
Start: 2023-11-15 | End: 2023-11-19 | Stop reason: HOSPADM

## 2023-11-15 RX ORDER — FAMOTIDINE 20 MG/1
20 TABLET, FILM COATED ORAL DAILY
Status: DISCONTINUED | OUTPATIENT
Start: 2023-11-16 | End: 2023-11-19 | Stop reason: HOSPADM

## 2023-11-15 RX ORDER — MILRINONE LACTATE 0.2 MG/ML
0.12 INJECTION, SOLUTION INTRAVENOUS CONTINUOUS
Status: DISCONTINUED | OUTPATIENT
Start: 2023-11-15 | End: 2023-11-17

## 2023-11-15 RX ADMIN — METHOCARBAMOL 500 MG: 500 TABLET ORAL at 08:11

## 2023-11-15 RX ADMIN — VASOPRESSIN 0.04 UNITS/MIN: 20 INJECTION INTRAVENOUS at 11:11

## 2023-11-15 RX ADMIN — AMIODARONE HYDROCHLORIDE 1 MG/MIN: 1.8 INJECTION, SOLUTION INTRAVENOUS at 12:11

## 2023-11-15 RX ADMIN — DOCUSATE SODIUM 100 MG: 100 CAPSULE, LIQUID FILLED ORAL at 08:11

## 2023-11-15 RX ADMIN — MUPIROCIN: 20 OINTMENT TOPICAL at 08:11

## 2023-11-15 RX ADMIN — SODIUM CHLORIDE 25 MG/HR: 9 INJECTION, SOLUTION INTRAVENOUS at 02:11

## 2023-11-15 RX ADMIN — METHOCARBAMOL 500 MG: 500 TABLET ORAL at 03:11

## 2023-11-15 RX ADMIN — METHOCARBAMOL 500 MG: 500 TABLET ORAL at 09:11

## 2023-11-15 RX ADMIN — MILRINONE LACTATE IN DEXTROSE 0.12 MCG/KG/MIN: 200 INJECTION, SOLUTION INTRAVENOUS at 08:11

## 2023-11-15 RX ADMIN — INSULIN ASPART 2 UNITS: 100 INJECTION, SOLUTION INTRAVENOUS; SUBCUTANEOUS at 02:11

## 2023-11-15 RX ADMIN — ASPIRIN 325 MG ORAL TABLET 325 MG: 325 PILL ORAL at 08:11

## 2023-11-15 RX ADMIN — ACETAMINOPHEN 1000 MG: 500 TABLET ORAL at 09:11

## 2023-11-15 RX ADMIN — MUPIROCIN: 20 OINTMENT TOPICAL at 09:11

## 2023-11-15 RX ADMIN — HEPARIN SODIUM 5000 UNITS: 5000 INJECTION INTRAVENOUS; SUBCUTANEOUS at 03:11

## 2023-11-15 RX ADMIN — VASOPRESSIN 0.04 UNITS/MIN: 20 INJECTION INTRAVENOUS at 07:11

## 2023-11-15 RX ADMIN — HEPARIN SODIUM 5000 UNITS: 5000 INJECTION INTRAVENOUS; SUBCUTANEOUS at 09:11

## 2023-11-15 RX ADMIN — DOCUSATE SODIUM 100 MG: 100 CAPSULE, LIQUID FILLED ORAL at 09:11

## 2023-11-15 RX ADMIN — RISPERIDONE 1 MG: 1 TABLET ORAL at 09:11

## 2023-11-15 RX ADMIN — AMIODARONE HYDROCHLORIDE 1 MG/MIN: 1.8 INJECTION, SOLUTION INTRAVENOUS at 05:11

## 2023-11-15 RX ADMIN — ACETAMINOPHEN 1000 MG: 500 TABLET ORAL at 05:11

## 2023-11-15 RX ADMIN — Medication: at 03:11

## 2023-11-15 RX ADMIN — FAMOTIDINE 20 MG: 20 TABLET, FILM COATED ORAL at 08:11

## 2023-11-15 RX ADMIN — VASOPRESSIN 0.04 UNITS/MIN: 20 INJECTION INTRAVENOUS at 02:11

## 2023-11-15 RX ADMIN — MONTELUKAST 10 MG: 10 TABLET, FILM COATED ORAL at 08:11

## 2023-11-15 RX ADMIN — ELVITEGRAVIR, COBICISTAT, EMTRICITABINE, AND TENOFOVIR ALAFENAMIDE 1 TABLET: 150; 150; 200; 10 TABLET ORAL at 01:11

## 2023-11-15 RX ADMIN — RISPERIDONE 1 MG: 1 TABLET ORAL at 08:11

## 2023-11-15 RX ADMIN — POLYETHYLENE GLYCOL 3350 17 G: 17 POWDER, FOR SOLUTION ORAL at 08:11

## 2023-11-15 RX ADMIN — DULOXETINE HYDROCHLORIDE 60 MG: 60 CAPSULE, DELAYED RELEASE ORAL at 11:11

## 2023-11-15 NOTE — PLAN OF CARE
PT evaluation complete - see note for details. POC and goals established.    Problem: Physical Therapy  Goal: Physical Therapy Goal  Description: Goals to be met by: 12/15/23     Patient will increase functional independence with mobility by performin. Supine to sit with Contact Guard Assistance  2. Sit to supine with Contact Guard Assistance  3. Sit to stand transfer with Contact Guard Assistance  4. Bed to chair transfer with Contact Guard Assistance using No Assistive Device  5. Gait  x 200 feet with Contact Guard Assistance using No Assistive Device.     Outcome: Ongoing, Progressing     11/15/2023

## 2023-11-15 NOTE — ASSESSMENT & PLAN NOTE
Neuro/Psych:   - Sedation: none  - Pain:  Appears comfortable. Currently endorsing chronic back pain. Her psychiatric history and prior drug use complicates pain control. 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 (She won't specify chronicity)  - Scheduled acetaminophen   - She fills flexeril regularly, we will use methocarbamol inpatient as it may be slightly less sedating for her      - We will discontinue the hydromorphone PCA, she has received 3.4 mg in the past 24 hours  - Psych: intermittently somnolent, awake. Slowed behavior.   - She has a history of bipolar, anxiety, depression. She is prescribed risperidone, but she reports she does not take this medication.   - Increase risperidone to 2mg bid            Cardiac:   - s/p Ross procedure for bicuspid aortic valve and PFO closure with Dr. Montgomery  - Postoperative MONCHO: mild right ventricular dysfunction with normal LV function  - BP Goal: MAP>60 mmHg, SBP<120 mmHg  - Inotropes/Pressors: Milrinone 0.125 - continue to wean vasopressor support.  - Anti-HTNs: Clevidipine PRN  - Rhythm: junctional rhythm, rate 70s  - Beta Blocker: Start per CTS after milrinone weaned off  - Statin: Start per CTS  -    - Had multiple episodes of atrial flutter vs. junctional rhythm 10/14 with HR in 140s. Started on amiodarone infusion. Will transition to PO today. Continue PO 400mg amiodarone bid for 7 days.      Pulmonary:   - Nasal cannula  - Goal SpO2 >92%  - Wean supplemental oxygen as tolerated  - PRN albuterol for wheezing or SOB  - Patient is prescribed Advair, however she reports she does not use this  - We will substitute Breo while she is inpatient  - Continue IS  - Chest Tubes x 2 (2 Meds) - output 75 past 24 hours, will remove today  - ABGs Q1H x 6H, Q3H x 3  - ABGS PRN      Renal  - Trend BUN/Cr   - Maintain Crooks, record strict Is/Os  - Monitor UOP 825cc/24H, Net +781cc/24H  - Started furosemide infusion, max dose 30mg/hr  -  Titrate lasix gtt to UOP goal   - She has an ASTON today improving from yesterday 35/1.6, today 45/1.3  - We will continue to monitor closely     Recent Labs   Lab 11/13/23  1518 11/14/23 0340 11/15/23  0211   BUN 18 20 35*   CREATININE 0.9 0.9 1.6*          FEN / GI:   - Daily CMP, PRN K/Mag/Phos per protocol   - Replace electrolytes as needed  - Nutrition: Cardiac diet, fluid restriction  - Bowel Regimen: Miralax, docusate     ID:   - Tmax: 101.5 early 11/14 morning, fever curve downtrend, no longer febrile  - She was febrile without a leukocytosis. Likely inflammatory in setting of recent surgical stress. Will continue to monitor.   - Abx: Complete perioperative cefazolin 2g Q8H x 5 doses  - She has a history of HIV. She reports compliance with her antivirals. Her family brought her home medication, we will continue.     Recent Labs   Lab 11/13/23  1518 11/14/23  0340 11/15/23  0211   WBC 10.55 10.92 11.75          Heme/Onc:   - Hgb 14.3 pre-operatively, slight downtrend but stable overall postoperatively  - Received 2 units of PRBCs, 950cc Cell Saver intraoperatively  - CBC daily  - ASA 325mg daily, will start pending chest tube output    Recent Labs   Lab 11/13/23  1518 11/14/23 0340 11/15/23  0211   HGB 10.3* 9.4* 8.6*    148* 107*   APTT 31.3 27.2 38.7*   INR 1.1 1.1 1.3*          Endocrine:   - CTS Goal -140  - HgbA1c:   - Endocrinology consulted for insulin management     PPx:   Feeding: Cardiac diet, fluid restriction  Analgesia/Sedation: controlled, PCA transition to PO today  Thromboembolic Prevention: Heparin 5000 tid  HOB >30: Yes  Stress Ulcer: Not indicated, famotidine PO per CTS  Glucose Control: Yes, insulin management per Endocrinology     Lines/Drains/Airway:   Art Line   Central Line   Crooks   Chest Tubes: 2 mediastinal - remove today   Pacing Wires: Temporary ventricular pacing wires- removed     Dispo/Code Status/Palliative:   - Continue SICU Care  - Full Code

## 2023-11-15 NOTE — SUBJECTIVE & OBJECTIVE
"Interval History/Significant Events: NAEON. Patient was started on lasix gtt yesterday for decreased urine output. Her mentation is abnormal, she is oriented and intermittently moans saying that she is in pain. When she is instructed to use her PCA button, she says she "can't" and claims her hands are weak; however, patient has received over 9mg on the PCA the past 24 hours.         Follow-up For: Procedure(s) (LRB):  REPLACEMENT, AORTIC VALVE, USING ROSS PROCEDURE (N/A)    Post-Operative Day: 2 Days Post-Op    Objective:     Vital Signs (Most Recent):  Temp: 99.5 °F (37.5 °C) (11/15/23 0745)  Pulse: 73 (11/15/23 0745)  Resp: (!) 34 (11/14/23 2035)  BP: (!) 94/50 (11/15/23 0700)  SpO2: 97 % (11/15/23 0745) Vital Signs (24h Range):  Temp:  [96.4 °F (35.8 °C)-100 °F (37.8 °C)] 99.5 °F (37.5 °C)  Pulse:  [] 73  Resp:  [22-34] 34  SpO2:  [90 %-100 %] 97 %  BP: ()/(48-64) 94/50  Arterial Line BP: ()/(41-74) 95/51     Weight: 74.5 kg (164 lb 3.9 oz)  Body mass index is 33.17 kg/m².      Intake/Output Summary (Last 24 hours) at 11/15/2023 0752  Last data filed at 11/15/2023 0700  Gross per 24 hour   Intake 1963.34 ml   Output 1150 ml   Net 813.34 ml          Physical Exam  Vitals and nursing note reviewed.   Constitutional:       General: She is not in acute distress.     Appearance: She is not ill-appearing or toxic-appearing.   HENT:      Head: Normocephalic and atraumatic.      Right Ear: External ear normal.      Left Ear: External ear normal.      Nose: Nose normal.   Eyes:      General:         Right eye: No discharge.         Left eye: No discharge.   Cardiovascular:      Rate and Rhythm: Normal rate and regular rhythm.      Pulses: Normal pulses.      Heart sounds: Normal heart sounds. No murmur heard.     No gallop.   Pulmonary:      Effort: Pulmonary effort is normal. No accessory muscle usage or respiratory distress.      Breath sounds: Normal breath sounds. No wheezing or rales.   Abdominal: "      General: Abdomen is flat. There is no distension.      Palpations: Abdomen is soft.      Tenderness: There is no abdominal tenderness. There is no guarding.   Musculoskeletal:      Right lower leg: No edema.      Left lower leg: No edema.   Skin:     General: Skin is warm and dry.   Neurological:      Mental Status: She is alert and oriented to person, place, and time.      Motor: No weakness.   Psychiatric:         Attention and Perception: She is inattentive.         Speech: Speech is tangential.         Behavior: Behavior is slowed. Behavior is not aggressive or combative.         Judgment: Judgment is impulsive.            Vents:  Vent Mode: Spont (11/14/23 0710)  Set Rate: 26 BPM (11/14/23 0300)  Vt Set: 350 mL (11/14/23 0300)  Pressure Support: 8 cmH20 (11/14/23 0710)  PEEP/CPAP: 5 cmH20 (11/14/23 0710)  Oxygen Concentration (%): 80 (11/14/23 2234)  Peak Airway Pressure: 14 cmH20 (11/14/23 0710)  Plateau Pressure: 0 cmH20 (11/14/23 0710)  Total Ve: 5.42 L/m (11/14/23 0710)  Negative Inspiratory Force (cm H2O): -3.2 (11/14/23 0710)  F/VT Ratio<105 (RSBI): (!) 58.14 (11/14/23 0425)    Lines/Drains/Airways       Central Venous Catheter Line  Duration             Introducer 11/13/23 0750 Internal Jugular Right 2 days    Percutaneous Central Line Insertion/Assessment - Triple Lumen  11/13/23 Internal Jugular Right 2 days              Drain  Duration                  Urethral Catheter 11/13/23 0715 Silicone;Straight-tip;Temperature probe;Non-latex 14 Fr. 2 days         Y Chest Tube 1 and 2 11/13/23 1415 1 Right Mediastinal 19 Fr. 2 Left Mediastinal 19 Fr. 1 day              Arterial Line  Duration             Arterial Line 11/13/23 0749 Left Radial 2 days              Line  Duration                  Pacer Wires 11/13/23 1415 1 day              Peripheral Intravenous Line  Duration                  Peripheral IV - Single Lumen 11/13/23 0628 20 G Anterior;Right Forearm 2 days         Peripheral IV - Single Lumen  11/13/23 16 G Posterior;Right Hand 2 days                    Significant Labs:    CBC/Anemia Profile:  Recent Labs   Lab 11/13/23  1518 11/13/23  1526 11/14/23  0340 11/14/23  0802 11/14/23  2300 11/15/23  0211 11/15/23  0352   WBC 10.55  --  10.92  --   --  11.75  --    HGB 10.3*  --  9.4*  --   --  8.6*  --    HCT 30.1*   < > 26.5*   < > 24* 25.7* 23*     --  148*  --   --  107*  --    MCV 91  --  88  --   --  94  --    RDW 13.8  --  14.6*  --   --  15.3*  --     < > = values in this interval not displayed.        Chemistries:  Recent Labs   Lab 11/13/23  1518 11/14/23  0340 11/15/23  0211 11/15/23  0510    144 136  --    K 4.0  4.0 4.2 5.4* 5.1   * 115* 108  --    CO2 20* 22* 18*  --    BUN 18 20 35*  --    CREATININE 0.9 0.9 1.6*  --    CALCIUM 9.1 8.6* 8.4*  --    ALBUMIN 2.5* 3.8 3.6  --    PROT 4.6* 5.2* 5.4*  --    BILITOT 1.3* 0.8 0.4  --    ALKPHOS 47* 36* 44*  --    ALT 24 20 36  --    * 123* 125*  --    MG 3.2*  --  2.3  --    PHOS 2.9  --  5.3*  --        All pertinent labs within the past 24 hours have been reviewed.    Significant Imaging:  I have reviewed all pertinent imaging results/findings within the past 24 hours.

## 2023-11-15 NOTE — PROGRESS NOTES
"[facility]  Critical Care - Surgery  Progress Note    Patient Name: Kendra Will  MRN: [id number]  Admission Date: 11/13/2023  Hospital Length of Stay: 2 days  Code Status: Full Code  Attending Provider: Nicola Montgomery MD  Primary Care Provider: Janett Gonzalez MD   Principal Problem: S/P aortic valve replacement    Subjective:     Hospital/ICU Course:  No notes on file    Interval History/Significant Events: NAEON. Patient was started on lasix gtt yesterday for decreased urine output. Her mentation is abnormal, she is oriented and intermittently moans saying that she is in pain. When she is instructed to use her PCA button, she says she "can't" and claims her hands are weak; however, patient has received over 9mg on the PCA the past 24 hours.         Follow-up For: Procedure(s) (LRB):  REPLACEMENT, AORTIC VALVE, USING ROSS PROCEDURE (N/A)    Post-Operative Day: 2 Days Post-Op    Objective:     Vital Signs (Most Recent):  Temp: 99.5 °F (37.5 °C) (11/15/23 0745)  Pulse: 73 (11/15/23 0745)  Resp: (!) 34 (11/14/23 2035)  BP: (!) 94/50 (11/15/23 0700)  SpO2: 97 % (11/15/23 0745) Vital Signs (24h Range):  Temp:  [96.4 °F (35.8 °C)-100 °F (37.8 °C)] 99.5 °F (37.5 °C)  Pulse:  [] 73  Resp:  [22-34] 34  SpO2:  [90 %-100 %] 97 %  BP: ()/(48-64) 94/50  Arterial Line BP: ()/(41-74) 95/51     Weight: 74.5 kg (164 lb 3.9 oz)  Body mass index is 33.17 kg/m².      Intake/Output Summary (Last 24 hours) at 11/15/2023 0752  Last data filed at 11/15/2023 0700  Gross per 24 hour   Intake 1963.34 ml   Output 1150 ml   Net 813.34 ml          Physical Exam  Vitals and nursing note reviewed.   Constitutional:       General: She is not in acute distress.     Appearance: She is not ill-appearing or toxic-appearing.   HENT:      Head: Normocephalic and atraumatic.      Right Ear: External ear normal.      Left Ear: External ear normal.      Nose: Nose normal.   Eyes:      General:         " Right eye: No discharge.         Left eye: No discharge.   Cardiovascular:      Rate and Rhythm: Normal rate and regular rhythm.      Pulses: Normal pulses.      Heart sounds: Normal heart sounds. No murmur heard.     No gallop.   Pulmonary:      Effort: Pulmonary effort is normal. No accessory muscle usage or respiratory distress.      Breath sounds: Normal breath sounds. No wheezing or rales.   Abdominal:      General: Abdomen is flat. There is no distension.      Palpations: Abdomen is soft.      Tenderness: There is no abdominal tenderness. There is no guarding.   Musculoskeletal:      Right lower leg: No edema.      Left lower leg: No edema.   Skin:     General: Skin is warm and dry.   Neurological:      Mental Status: She is alert and oriented to person, place, and time.      Motor: No weakness.   Psychiatric:         Attention and Perception: She is inattentive.         Speech: Speech is tangential.         Behavior: Behavior is slowed. Behavior is not aggressive or combative.         Judgment: Judgment is impulsive.            Vents:  Vent Mode: Spont (11/14/23 0710)  Set Rate: 26 BPM (11/14/23 0300)  Vt Set: 350 mL (11/14/23 0300)  Pressure Support: 8 cmH20 (11/14/23 0710)  PEEP/CPAP: 5 cmH20 (11/14/23 0710)  Oxygen Concentration (%): 80 (11/14/23 2234)  Peak Airway Pressure: 14 cmH20 (11/14/23 0710)  Plateau Pressure: 0 cmH20 (11/14/23 0710)  Total Ve: 5.42 L/m (11/14/23 0710)  Negative Inspiratory Force (cm H2O): -3.2 (11/14/23 0710)  F/VT Ratio<105 (RSBI): (!) 58.14 (11/14/23 0425)    Lines/Drains/Airways       Central Venous Catheter Line  Duration             Introducer 11/13/23 0750 Internal Jugular Right 2 days    Percutaneous Central Line Insertion/Assessment - Triple Lumen  11/13/23 Internal Jugular Right 2 days              Drain  Duration                  Urethral Catheter 11/13/23 0715 Silicone;Straight-tip;Temperature probe;Non-latex 14 Fr. 2 days         Y Chest Tube 1 and 2 11/13/23 8525 1  Right Mediastinal 19 Fr. 2 Left Mediastinal 19 Fr. 1 day              Arterial Line  Duration             Arterial Line 11/13/23 0749 Left Radial 2 days              Line  Duration                  Pacer Wires 11/13/23 1415 1 day              Peripheral Intravenous Line  Duration                  Peripheral IV - Single Lumen 11/13/23 0628 20 G Anterior;Right Forearm 2 days         Peripheral IV - Single Lumen 11/13/23 16 G Posterior;Right Hand 2 days                    Significant Labs:    CBC/Anemia Profile:  Recent Labs   Lab 11/13/23  1518 11/13/23  1526 11/14/23  0340 11/14/23  0802 11/14/23  2300 11/15/23  0211 11/15/23  0352   WBC 10.55  --  10.92  --   --  11.75  --    HGB 10.3*  --  9.4*  --   --  8.6*  --    HCT 30.1*   < > 26.5*   < > 24* 25.7* 23*     --  148*  --   --  107*  --    MCV 91  --  88  --   --  94  --    RDW 13.8  --  14.6*  --   --  15.3*  --     < > = values in this interval not displayed.        Chemistries:  Recent Labs   Lab 11/13/23  1518 11/14/23  0340 11/15/23  0211 11/15/23  0510    144 136  --    K 4.0  4.0 4.2 5.4* 5.1   * 115* 108  --    CO2 20* 22* 18*  --    BUN 18 20 35*  --    CREATININE 0.9 0.9 1.6*  --    CALCIUM 9.1 8.6* 8.4*  --    ALBUMIN 2.5* 3.8 3.6  --    PROT 4.6* 5.2* 5.4*  --    BILITOT 1.3* 0.8 0.4  --    ALKPHOS 47* 36* 44*  --    ALT 24 20 36  --    * 123* 125*  --    MG 3.2*  --  2.3  --    PHOS 2.9  --  5.3*  --        All pertinent labs within the past 24 hours have been reviewed.    Significant Imaging:  I have reviewed all pertinent imaging results/findings within the past 24 hours.  Assessment/Plan:     Pulmonary  Asthma  Hx asthma, will restart home inhalers  - Her home inhaler will be substituted for Breo while inpatient  - PRN Ron    Cardiac/Vascular  * S/P aortic valve replacement  Neuro/Psych:   - Sedation: none  - Pain:  Difficult to control, her psychiatric history and prior drug use complicates the picture. She is  intermittently somnolent and slow to respond, however she moans she is in pain and yells when the NIBP cuff inflates. 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 (She won't specify chronicity)  - Scheduled acetaminophen   - She fills flexeril regularly, we will use methocarbamol inpatient as it may be slightly less sedating for her      - Continue the hydromorphone PCA, she has received 9mg in the past 24 hours  - Psych: intermittently somnolent, awake. Slowed behavior.   - She has a history of bipolar, anxiety, depression. She is prescribed risperidone, but she reports she does not take this medication.   - Continue risperidone 1mg bid            Cardiac:   - s/p Ross procedure for bicuspid aortic valve and PFO closure with Dr. Montgomery  - Postoperative MONCHO: mild right ventricular dysfunction with normal LV function  - BP Goal: MAP>60 mmHg, SBP<120 mmHg  - Inotropes/Pressors: Milrinone 0.250, Vaso 0.04, Epinephrine 0.02 - continue to wean vasopressor support.  - Anti-HTNs: Clevidipine PRN  - Rhythm: NSR, rate 70s  - Beta Blocker: Start per CTS  - Statin: Start per CTS  -    - Had multiple episodes of atrial flutter vs. junctional rhythm yesterday with HR in 140s. Started on amiodarone infusion. Will continue today.      Pulmonary:   - Nasal cannula with ETCO2 monitoring for PCA  - Goal SpO2 >92%  - Wean supplemental oxygen as tolerated  - PRN albuterol for wheezing or SOB  - Patient is prescribed Advair, however she reports she does not use this  - We will substitute Breo while she is inpatient  - Continue IS  - Chest Tubes x 2 (2 Meds) - output 310 past 24 hours  - ABGs Q1H x 6H, Q3H x 3  - ABGS PRN      Renal  - Trend BUN/Cr   - Maintain Crooks, record strict Is/Os  - Monitor UOP 825cc/24H, Net +781cc/24H  - Started furosemide infusion, max dose 30mg/hr  - Titrate lasix gtt to UOP goal   - She has an ASTON today BUN 35/1.6  - We will continue to monitor closely      Recent Labs   Lab 11/13/23  1518 11/14/23  0340 11/15/23  0211   BUN 18 20 35*   CREATININE 0.9 0.9 1.6*          FEN / GI:   - Daily CMP, PRN K/Mag/Phos per protocol   - Replace electrolytes as needed  - Nutrition: Cardiac diet, fluid restriction  - Bowel Regimen: Miralax, docusate     ID:   - Tmax: 101.5 early 11/14 morning, fever curve downtrend  - She was febrile without a leukocytosis. Likely inflammatory in setting of recent surgical stress. Will continue to monitor.   - Abx: Complete perioperative cefazolin 2g Q8H x 5 doses  - She has a history of HIV. She reports compliance with her antivirals. I will speak with our pharmacist on availability and reorder when appropriate.     Recent Labs   Lab 11/13/23  1518 11/14/23 0340 11/15/23  0211   WBC 10.55 10.92 11.75          Heme/Onc:   - Hgb 14.3 pre-operatively, slight downtrend but stable overall postoperatively  - Received 2 units of PRBCs, 950cc Cell Saver intraoperatively  - CBC daily  - ASA 325mg daily, will start pending chest tube output    Recent Labs   Lab 11/13/23  1518 11/14/23  0340 11/15/23  0211   HGB 10.3* 9.4* 8.6*    148* 107*   APTT 31.3 27.2 38.7*   INR 1.1 1.1 1.3*          Endocrine:   - CTS Goal -140  - HgbA1c:   - Endocrinology consulted for insulin management     PPx:   Feeding: Cardiac diet, fluid restriction  Analgesia/Sedation:   Thromboembolic Prevention: Heparin 5000 tid  HOB >30: Yes  Stress Ulcer: Not indicated, famotidine PO per CTS  Glucose Control: Yes, insulin management per Endocrinology     Lines/Drains/Airway:   Art Line   Central Line   Crooks   Chest Tubes: 2 mediastinal   Pacing Wires: Temporary ventricular pacing wires     Dispo/Code Status/Palliative:   - Continue SICU Care  - Full Code    Nonrheumatic aortic valve stenosis  S/p replacement. See plan for primary problem.     Oncology  Acute blood loss anemia  Hgb prior to surgery 14.3, arrived with hgb 10.3. Expected blood loss in post-operative  setting. Continued slight downtrend, no s/s of continued bleeding. Will continue to monitor.     -- daily CBC  -- transfuse hgb < 7, symptomatic anemia      Endocrine  Transient hyperglycemia post procedure  - Blood glucose at goal.   - Appreciate endocrinology assistance with management           Critical secondary to Patient has a condition that poses threat to life and bodily function: Major cardiac surgery     Critical care was time spent personally by me on the following activities: development of treatment plan with patient or surrogate and bedside caregivers, discussions with consultants, evaluation of patient's response to treatment, examination of patient, ordering and performing treatments and interventions, ordering and review of laboratory studies, ordering and review of radiographic studies, pulse oximetry, re-evaluation of patient's condition.  This critical care time did not overlap with that of any other provider or involve time for any procedures.     Elver Jones, DO  Critical Care - Surgery  [facility]

## 2023-11-15 NOTE — PT/OT/SLP EVAL
Physical Therapy Co-Evaluation with OT and Treatment     Patient Name:  Kendra Will  MRN: [id number]    Admit Date: 11/13/2023  Admitting Diagnosis:  S/P aortic valve replacement  Length of Stay: 2 days  Recent Surgery: Procedure(s) (LRB):  REPLACEMENT, AORTIC VALVE, USING ROSS PROCEDURE (N/A) 2 Days Post-Op    Recommendations:     Discharge Recommendations: Moderate therapy intensity  Equipment recommendations: none  Barriers to discharge: Increased level of skilled assistance required and Fall risk     Assessment:     Kendra Will is a 48 y.o. female admitted to [facility] on 11/13/2023 with medical diagnosis of S/P aortic valve replacement. Pt presents with weakness, impaired endurance, impaired self care skills, impaired functional mobility, gait instability, impaired balance, decreased coordination, decreased safety awareness, pain, impaired cardiopulmonary response to activity. These deficits effect their roles and responsibilities in which they were able to complete prior to admit.     Pt is semi-agreeable to therapy evaluation. Pt appears to be limited by pain and therefore is a poor historian. Attempted to call mother for PLOF but she did not answer; will continue to gather information during upcoming sessions. Pt able to sit eob and transfer to recliner with significant assistance from therapists. Once in recliner, MAP decreased to 47. Nsg present during entire session increased Epinephrine from .02 to .06; MAP recovered shortly after. Will continue to progress pt as tolerated.    Kendra Will would benefit from acute PT intervention to improve quality of life, focus on recovery of impairments, provide patient/caregiver education, reduce fall risk, and maximize (I) and safety with functional mobility. Once medically stable, recommending pt discharge to moderate therapy intensity. Patient currently demonstrates a need for moderate intensity therapy on a daily basis post acute secondary to a decline in  functional status due to surgical procedure    Rehab Prognosis: Fair    Plan:     During this hospitalization, patient to be seen 5 x/week to address the identified rehab impairments via gait training, therapeutic activities, therapeutic exercises, neuromuscular re-education and progress towards stated goals.     Plan of Care Expires:  12/15/23  Plan of Care reviewed with: patient    This plan of care has been discussed with the patient/caregiver, who was included in its development and is in agreement with the identified goals and treatment plan.     Subjective     Communicated with RN prior to session.  Patient found supine upon PT entry to room, agreeable to evaluation. Pt alone during session.    Chief Complaint: pt screaming indiscriminately throughout session    Patient/Family Comments/goals: pt enjoys talking about her dog, Ananya    Pain/Comfort:  Pain Rating 1:  (pt did not rate)  Pain Addressed 1: Reposition, Distraction, Cessation of Activity    Patients cultural, spiritual, Voodoo conflicts given the current situation: None identified     Patient History: information obtained from none - attempted to call mom but no answer; Pt states she lives with her  but per chart review, pt is not        Objective:     Patient found with: arterial line, blood pressure cuff, central line, chest tube, ocampo catheter, PCA, oxygen, telemetry, pulse ox (continuous), SCD (pacer)    Recent Surgery: Procedure(s) (LRB):  REPLACEMENT, AORTIC VALVE, USING ROSS PROCEDURE (N/A) 2 Days Post-Op  General Precautions: Standard, sternal, fall   Orthopedic Precautions:N/A   Braces: N/A   Oxygen Device: nasal cannula - 2L      Exams:    Cognition:  Alert  Command following: Follows multistep verbal commands 50% of time  Communication: clear/fluent    Sensation:   Light touch sensation: Intact BLEs    Gross Motor Coordination: KRISHAN    Edema/Skin Integrity: None noted; Visible skin intact    Postural examination/scapula  alignment: Rounded shoulder and Head forward    Lower Extremity Range of Motion:  Right Lower Extremity: WFL  Left Lower Extremity: WFL    Lower Extremity Strength: KRISHAN    Functional Mobility:    Bed Mobility:  Supine > Sit with maximal assistance x2  Sit > Supine with maximal assistance x2    Transfers:   Sit <> Stand Transfer: Maximum Assistancex2 x 1 trials from eob with no AD   Bed <> Chair: Maximum Assistancex2 with no AD via stand pivot transfer            Balance:  Dynamic Sitting: FAIR+: Maintains balance through MINIMAL excursions of active trunk motion  Standing:  Static: 0: Needs MAXIMAL assist to maintain    Dynamic: 0: N/A    Outcome Measure: AM-PAC 6 CLICK MOBILITY  Total Score:10     Patient/Caregiver Education:   Therapist reinforced education re:   Post-op sternal precautions, including no lifting > 5 lbs, pulling or pushing with BUEs.    Modifying daily activities and functional mobility tasks to maintain sternal precautions appropriately   Importance of participation in therapy and engaging in increased OOB mobility with assistance to improve endurance     Therapist educated pt/caregiver regarding:   PT POC and goals for therapy   Safety with mobility and fall risk   Instruction on use of call button and importance of calling nursing staff for assistance with mobility   Time provided for therapeutic counseling and discussion of current health disposition. All questions answered to satisfaction, within scope of PT practice     Patient/caregiver able to verbalize understanding and expressed no further questions this visit; will follow-up with pt/caregiver during current admit for additional questions/concerns within scope of practice.     White board updated.     Patient left up in chair with all lines intact, call button in reach, and nsg present.    GOALS:   Multidisciplinary Problems       Physical Therapy Goals          Problem: Physical Therapy    Goal Priority Disciplines Outcome Goal  Variances Interventions   Physical Therapy Goal     PT, PT/OT Ongoing, Progressing     Description: Goals to be met by: 12/15/23     Patient will increase functional independence with mobility by performin. Supine to sit with Contact Guard Assistance  2. Sit to supine with Contact Guard Assistance  3. Sit to stand transfer with Contact Guard Assistance  4. Bed to chair transfer with Contact Guard Assistance using No Assistive Device  5. Gait  x 200 feet with Contact Guard Assistance using No Assistive Device.                            History:     Past Medical History:   Diagnosis Date    Anxiety     Asthma     Bipolar affective     Depression     HIV (human immunodeficiency virus infection)     Immune deficiency disorder     Insomnia     Migraine headache     Mitral valve prolapse     Stroke        Past Surgical History:   Procedure Laterality Date    CERVICAL FUSION  2014    CORONARY ANGIOGRAPHY N/A 2023    Procedure: ANGIOGRAM, CORONARY ARTERY;  Surgeon: Micah Medel MD;  Location: [facility] CATH LAB;  Service: Cardiology;  Laterality: N/A;    HAND SURGERY      LEFT HEART CATHETERIZATION Left 2023    Procedure: Left heart cath;  Surgeon: Micah Medel MD;  Location: [facility] CATH LAB;  Service: Cardiology;  Laterality: Left;    NECK SURGERY      REPLACEMENT OF AORTIC VALVE USING ROSS PROCEDURE N/A 2023    Procedure: REPLACEMENT, AORTIC VALVE, USING ROSS PROCEDURE;  Surgeon: Nicola Montgomery MD;  Location: [facility] OR [address] [place] [address];  Service: Cardiovascular;  Laterality: N/A;       Time Tracking:     PT Received On: 11/15/23  PT Start Time: 1005     PT Stop Time: 1022  PT Total Time (min): 17 min     Billable Minutes: Evaluation 8 and Neuromuscular Re-education 9    11/15/2023

## 2023-11-15 NOTE — ASSESSMENT & PLAN NOTE
Hgb prior to surgery 14.3, arrived with hgb 10.3. Expected blood loss in post-operative setting. Continued slight downtrend, no s/s of continued bleeding. Will continue to monitor.     -- daily CBC  -- transfuse hgb < 7, symptomatic anemia

## 2023-11-15 NOTE — ASSESSMENT & PLAN NOTE
Hgb prior to surgery 14.3, arrived with hgb 10.3. Expected blood loss in post-operative setting.     -- daily CBC  -- transfuse hgb < 7, symptomatic anemia

## 2023-11-15 NOTE — PLAN OF CARE
SICU PLAN OF CARE NOTE    Dx: S/P aortic valve replacement    Goals of Care: MAP >60 SBP <120      Neuro: AAO x4, Follows Commands, and Moves All Extremities    Cardiac: Afib rate controled 70-80    Respiratory: BiPAP/CPAP    Gtts: Epinephrine , Vasopressin, Milrinone, Lasix, and Amiodarone    Urine Output: Urinary Catheter 715 cc/shift  Goal of 50-100cc/hr titrating lasix for goal, max at 30mg.       Drains: Chest Tube, total output 90 cc /  shift    Diet: Clear Liquid; tolerating without issues          Labs/Accuchecks: daily labs, ACHS accucheck, PRN ABGS.    Skin: No breakdown noted. Foam padding in use. Patient on shyla bed with waffle mattress over and inflated.     Shift Events: Patient exteremly restless all shift. Patient neuro intact, but does not always answer questions when asked directly. Educated multiple times throughout night on use of PCA pump. MM in use orally as well. Patient had to be redirected multiple times throughout shift to not push with arms, further educated on sternal precautions each time as well. Patient able to fairly tolerate Bipap overnight for CO2 retention, ABG this AM improved appropriately, transitioned back to NC, PRN abgs as needed ordered. POC discussed with patient all throughout shift.

## 2023-11-15 NOTE — SUBJECTIVE & OBJECTIVE
"Interval HPI:   No acute events overnight. Patient in room 09636/61224 A. Blood glucose stable. BG at goal on current insulin regimen (Transition Insulin Drip). Steroid use- None .   2 Days Post-Op  Renal function- Abnormal - Cr 1.6   Vasopressors-  None     Diet Cardiac Fluid - 1500mL     Eatin%  Nausea: No  Hypoglycemia and intervention: No  Fever: No  TPN and/or TF: No    BP (!) 94/50   Pulse 73   Temp 99.5 °F (37.5 °C)   Resp (!) 34   Ht 4' 11" (1.499 m)   Wt 74.5 kg (164 lb 3.9 oz)   LMP 2018 (Approximate) Comment: Irregular periods  SpO2 97%   Breastfeeding No   BMI 33.17 kg/m²     Labs Reviewed and Include    Recent Labs   Lab 11/15/23  0211 11/15/23  0510   *  --    CALCIUM 8.4*  --    ALBUMIN 3.6  --    PROT 5.4*  --      --    K 5.4* 5.1   CO2 18*  --      --    BUN 35*  --    CREATININE 1.6*  --    ALKPHOS 44*  --    ALT 36  --    *  --    BILITOT 0.4  --      Lab Results   Component Value Date    WBC 11.75 11/15/2023    HGB 8.6 (L) 11/15/2023    HCT 23 (L) 11/15/2023    MCV 94 11/15/2023     (L) 11/15/2023     No results for input(s): "TSH", "FREET4" in the last 168 hours.  Lab Results   Component Value Date    HGBA1C 5.2 2022       Nutritional status:   Body mass index is 33.17 kg/m².  Lab Results   Component Value Date    ALBUMIN 3.6 11/15/2023    ALBUMIN 3.8 2023    ALBUMIN 2.5 (L) 2023     No results found for: "PREALBUMIN"    Estimated Creatinine Clearance: 39.7 mL/min (A) (based on SCr of 1.6 mg/dL (H)).    Accu-Checks  Recent Labs     23  0313 23  0407 23  0542 23  0707 23  0812 23  0900 23  1035 23  1748 23  2224 11/15/23  0209   POCTGLUCOSE 112* 115* 114* 114* 107 104 126* 131* 144* 158*       Current Medications and/or Treatments Impacting Glycemic Control  Immunotherapy:    Immunosuppressants       None          Steroids:   Hormones (From admission, onward)      Start  "    Stop Route Frequency Ordered    11/14/23 1000  vasopressin (PITRESSIN) 0.2 Units/mL in dextrose 5 % (D5W) 100 mL infusion         -- IV Continuous 11/14/23 0856          Pressors:    Autonomic Drugs (From admission, onward)      Start     Stop Route Frequency Ordered    11/13/23 1530  EPINEPHrine 5 mg in dextrose 5% 250 mL infusion (premix)        Question Answer Comment   Begin at (in mcg/kg/min): 0.02    Titrate by: (in mcg/kg/min) 0.02    Titrate interval: (in minutes) 5    Titrate to maintain: (SBP or MAP or Cardiac Index) MAP    Greater than: (in mmHg) 60    Maximum dose: (in mcg/kg/min) 2        -- IV Continuous 11/13/23 1523          Hyperglycemia/Diabetes Medications:   Antihyperglycemics (From admission, onward)      Start     Stop Route Frequency Ordered    11/14/23 1010  insulin aspart U-100 pen 0-10 Units         -- SubQ As needed (PRN) 11/14/23 0911

## 2023-11-15 NOTE — PROGRESS NOTES
"[facility] - [facility]  Endocrinology  Progress Note    Admit Date: 2023     Reason for Consult: Management of Hyperglycemia     Surgical Procedure and Date:  23  REPLACEMENT, AORTIC VALVE, USING ROSS PROCEDURE (N/A)     Lab Results   Component Value Date    HGBA1C 5.2 2022       HPI:   Patient is a 48 y.o. 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 She has GARCIA due to her severe AS. Patient now presents for the above procedure(s). Endocrinology consulted for management of hyperglycemia.       Interval HPI:   No acute events overnight. Patient in room 64936/31234 A. Blood glucose stable. BG at goal on current insulin regimen (Transition Insulin Drip). Steroid use- None .   2 Days Post-Op  Renal function- Abnormal - Cr 1.6   Vasopressors-  None     Diet Cardiac Fluid - 1500mL     Eatin%  Nausea: No  Hypoglycemia and intervention: No  Fever: No  TPN and/or TF: No    BP (!) 94/50   Pulse 73   Temp 99.5 °F (37.5 °C)   Resp (!) 34   Ht 4' 11" (1.499 m)   Wt 74.5 kg (164 lb 3.9 oz)   LMP 2018 (Approximate) Comment: Irregular periods  SpO2 97%   Breastfeeding No   BMI 33.17 kg/m²     Labs Reviewed and Include    Recent Labs   Lab 11/15/23  0211 11/15/23  0510   *  --    CALCIUM 8.4*  --    ALBUMIN 3.6  --    PROT 5.4*  --      --    K 5.4* 5.1   CO2 18*  --      --    BUN 35*  --    CREATININE 1.6*  --    ALKPHOS 44*  --    ALT 36  --    *  --    BILITOT 0.4  --      Lab Results   Component Value Date    WBC 11.75 11/15/2023    HGB 8.6 (L) 11/15/2023    HCT 23 (L) 11/15/2023    MCV 94 11/15/2023     (L) 11/15/2023     No results for input(s): "TSH", "FREET4" in the last 168 hours.  Lab Results   Component Value Date    HGBA1C 5.2 2022       Nutritional status:   Body mass " "index is 33.17 kg/m².  Lab Results   Component Value Date    ALBUMIN 3.6 11/15/2023    ALBUMIN 3.8 11/14/2023    ALBUMIN 2.5 (L) 11/13/2023     No results found for: "PREALBUMIN"    Estimated Creatinine Clearance: 39.7 mL/min (A) (based on SCr of 1.6 mg/dL (H)).    Accu-Checks  Recent Labs     11/14/23  0313 11/14/23  0407 11/14/23  0542 11/14/23  0707 11/14/23  0812 11/14/23  0900 11/14/23  1035 11/14/23  1748 11/14/23  2224 11/15/23  0209   POCTGLUCOSE 112* 115* 114* 114* 107 104 126* 131* 144* 158*       Current Medications and/or Treatments Impacting Glycemic Control  Immunotherapy:    Immunosuppressants       None          Steroids:   Hormones (From admission, onward)      Start     Stop Route Frequency Ordered    11/14/23 1000  vasopressin (PITRESSIN) 0.2 Units/mL in dextrose 5 % (D5W) 100 mL infusion         -- IV Continuous 11/14/23 0856          Pressors:    Autonomic Drugs (From admission, onward)      Start     Stop Route Frequency Ordered    11/13/23 1530  EPINEPHrine 5 mg in dextrose 5% 250 mL infusion (premix)        Question Answer Comment   Begin at (in mcg/kg/min): 0.02    Titrate by: (in mcg/kg/min) 0.02    Titrate interval: (in minutes) 5    Titrate to maintain: (SBP or MAP or Cardiac Index) MAP    Greater than: (in mmHg) 60    Maximum dose: (in mcg/kg/min) 2        -- IV Continuous 11/13/23 1523          Hyperglycemia/Diabetes Medications:   Antihyperglycemics (From admission, onward)      Start     Stop Route Frequency Ordered    11/14/23 1010  insulin aspart U-100 pen 0-10 Units         -- SubQ As needed (PRN) 11/14/23 0911            ASSESSMENT and PLAN    Cardiac/Vascular  * S/P aortic valve replacement  Managed per primary team  Optimize BG control        Nonrheumatic aortic valve stenosis  Managed per primary team  Optimize bg control        Endocrine  Transient hyperglycemia post procedure  BG goal 110-140 (CTS protocol)   No hx of DM. BG stable post surgery.  Requirements decreasing on " transition drip.  Will d/c and monitor on correction.       -discontinue transition drip   - Continue  /25  -POCT glucose AC/HS  -Hypoglycemia protocol    ** Please call Endocrine for any BG related issues **    Discharge planning: LAVERN Nicolas PA-C  Endocrinology  [facility] - Surgical Intensive Care

## 2023-11-15 NOTE — ASSESSMENT & PLAN NOTE
BG goal 110-140 (CTS protocol)   No hx of DM. BG stable post surgery.  Requirements decreasing on transition drip.  Will d/c and monitor on correction.       -discontinue transition drip   - Continue  [facility] 150/25  -POCT glucose //0200  -Hypoglycemia protocol    ** Please call Endocrine for any BG related issues **    Discharge planning: LAVERN

## 2023-11-15 NOTE — PLAN OF CARE
Problem: Occupational Therapy  Goal: Occupational Therapy Goal  Description: Goals to be met by: 11/29/2023     Patient will increase functional independence with ADLs by performing:    UE Dressing with Minimal Assistance.  LE Dressing with Moderate Assistance.  Grooming while EOB with Minimal Assistance.  Toileting from bedside commode with Minimal Assistance for hygiene and clothing management.   Supine to sit with Minimal Assistance.  Stand pivot transfer with Minimal Assistance.  Toilet transfer to bedside commode with Minimal Assistance.    Outcome: Ongoing, Progressing     Pt evaluated and OT goals established.

## 2023-11-15 NOTE — ASSESSMENT & PLAN NOTE
Hx asthma, will restart home inhalers  - Her home inhaler will be substituted for Breo while inpatient  - SALVADOR Velasquez

## 2023-11-15 NOTE — PT/OT/SLP EVAL
Occupational Therapy   Evaluation and Treatment    Co-eval with PT to have 2 skilled therapists present to safely assess pt's functional mobility.     Name: Kendra Will  MRN: [id number]  Admitting Diagnosis: S/P aortic valve replacement  Recent Surgery: Procedure(s) (LRB):  REPLACEMENT, AORTIC VALVE, USING ROSS PROCEDURE (N/A) 2 Days Post-Op    Recommendations:     Discharge Recommendations: Moderate Intensity Therapy  Discharge Equipment Recommendations:  other (see comments) (TBD pending progress)  Barriers to discharge:  Other (Comment), Decreased caregiver support (increased assistance with ADLs and mobility)    Assessment:     Kendra Will is a 48 y.o. female with a medical diagnosis of S/P aortic valve replacement.  Pt tolerated session well and without incident after maximal encouragement from therapists and her nurse to participate.  She requires significant assistance with ADLs and mobility due to pain and self-limiting behavior.  Pt's performing below her functional baseline.  Due to her current level of function and her home environment, moderate intensity therapy recommended at d/c for maximal pt gains in functional independence and to ensure her safety upon returning home.  She presents with the following.  Performance deficits affecting function: weakness, impaired endurance, impaired self care skills, impaired functional mobility, gait instability, impaired balance, pain, decreased safety awareness, decreased lower extremity function, decreased ROM, decreased upper extremity function, decreased coordination, impaired fine motor, impaired skin, impaired cardiopulmonary response to activity (sternal precautions).      Rehab Prognosis: Good; patient would benefit from acute skilled OT services to address these deficits and reach maximum level of function.       Plan:     Patient to be seen 5 x/week to address the above listed problems via self-care/home management, therapeutic activities,  "therapeutic exercises  Plan of Care Expires: 12/13/23  Plan of Care Reviewed with: patient    Subjective     Chief Complaint: "I can't."  Patient/Family Comments/goals: "I'm going to fall."    Occupational Profile: (obtained per chart due to pt being questionable historian and therapist unable to reach pt's family)  Living Environment: Per chart, pt lives alone in a H.  Pt said she lives with her .  She will d/c to her parents' house.    Previous level of function: unknown  Roles and Routines: daughter  Equipment Used at Home: other (see comments) (uknown)  Assistance upon Discharge: Her parents, per chart    Pain/Comfort:  Pain Rating 1: other (see comments) (not rated nor specified; pt moaned in pain prior to and during mobility)  Pain Addressed 1: Distraction, Reposition, Pre-medicate for activity, Nurse notified, Cessation of Activity  Pain Rating Post-Intervention 1: other (see comments) (unchanged)    Patients cultural, spiritual, Voodoo conflicts given the current situation: no    Objective:     Communicated with: nurse and PT prior to session.  Patient found HOB elevated with MD team and her nurse present with arterial line, peripheral IV, central line, ocampo catheter, chest tube, telemetry, blood pressure cuff, oxygen, pulse ox (continuous) (pacer wires, introducer) upon OT entry to room.    General Precautions: Standard, sternal, fall  Orthopedic Precautions: N/A  Braces: N/A  Respiratory Status: Nasal cannula, flow 2 L/min    Occupational Performance:    Bed Mobility:    Patient completed Supine to Sit to the L with maximal assistance and 2 persons    Functional Mobility/Transfers:  Patient completed Sit <> Stand Transfer from EOB x 2 trials with maximal assistance and of 2 persons  with  hand-held assist.  For 1st trial she was afraid of falling and sat back down EOB almost immediately.     Patient completed Bed <> Chair Transfer using Stand Pivot technique with maximal assistance and of 2 " persons with hand-held assist.  Her MAP decreased to 47; her nurse was present and adjusted her epinephrine that helped it recover to within parameters.      Activities of Daily Living:  Feeding:  maximal assistance using [place] (A) to grasp cup of water using her R hand and to drink from a straw while seated in bedside chair  Lower Body Dressing: total assistance to cheryle non-skid socks while supine    Cognitive/Visual Perceptual:  Cognitive/Psychosocial Skills:     -       Pt was alert but confused.   -       Follows Commands/attention: Inattentive and Easily distracted, causing her difficulty to follow one-step commands  -       Communication: clear/fluent  -       Mood/Affect/Coping skills/emotional control: Labile, Cooperative, and Anxious    Physical Exam:  Upper Extremity Range of Motion:     -       Right Upper Extremity: AAROM WFL within her sternal precautions; pt had difficulty following one-step commands  -       Left Upper Extremity: AAROM WFL within her sternal precautions; pt had difficulty following one-step commands  Upper Extremity Strength:    -       Right Upper Extremity: unable to assess due to sternal precautions  -       Left Upper Extremity: unable to assess due to sternal precautions   Strength:    -       Right Upper Extremity: weak composite grasp on command  -       Left Upper Extremity: weak composite grasp on command    AMPAC 6 Click ADL:  AMPAC Total Score: 10    Treatment & Education:  Pt edu on role of OT, POC, her sternal precautions, safety when performing self care tasks, benefit of performing EOB/OOB activity, and safety when performing functional transfers and mobility.    - Self care tasks completed-- as noted above      Patient left up in chair with all lines intact, call button in reach, nurse notified, and nurse present    GOALS:   Multidisciplinary Problems       Occupational Therapy Goals          Problem: Occupational Therapy    Goal Priority Disciplines Outcome  Interventions   Occupational Therapy Goal     OT, PT/OT Ongoing, Progressing    Description: Goals to be met by: 11/29/2023     Patient will increase functional independence with ADLs by performing:    UE Dressing with Minimal Assistance.  LE Dressing with Moderate Assistance.  Grooming while EOB with Minimal Assistance.  Toileting from bedside commode with Minimal Assistance for hygiene and clothing management.   Supine to sit with Minimal Assistance.  Stand pivot transfer with Minimal Assistance.  Toilet transfer to bedside commode with Minimal Assistance.                         History:     Past Medical History:   Diagnosis Date    Anxiety     Asthma     Bipolar affective     Depression     HIV (human immunodeficiency virus infection)     Immune deficiency disorder     Insomnia     Migraine headache     Mitral valve prolapse     Stroke          Past Surgical History:   Procedure Laterality Date    CERVICAL FUSION  5/13/2014    CORONARY ANGIOGRAPHY N/A 9/25/2023    Procedure: ANGIOGRAM, CORONARY ARTERY;  Surgeon: Micah Medel MD;  Location: [facility] CATH LAB;  Service: Cardiology;  Laterality: N/A;    HAND SURGERY      LEFT HEART CATHETERIZATION Left 9/25/2023    Procedure: Left heart cath;  Surgeon: Micah Medel MD;  Location: [facility] CATH LAB;  Service: Cardiology;  Laterality: Left;    NECK SURGERY      REPLACEMENT OF AORTIC VALVE USING ROSS PROCEDURE N/A 11/13/2023    Procedure: REPLACEMENT, AORTIC VALVE, USING ROSS PROCEDURE;  Surgeon: Nicola Montgomery MD;  Location: [facility] OR [address] [place] [address];  Service: Cardiovascular;  Laterality: N/A;       Time Tracking:     OT Date of Treatment: 11/15/23  OT Start Time: 1006  OT Stop Time: 1023  OT Total Time (min): 17 min    Billable Minutes:Evaluation 8 min  Therapeutic Activity 9 min    11/15/2023

## 2023-11-16 LAB
ALBUMIN SERPL BCP-MCNC: 3.3 G/DL (ref 3.5–5.2)
ALLENS TEST: ABNORMAL
ALP SERPL-CCNC: 59 U/L (ref 55–135)
ALT SERPL W/O P-5'-P-CCNC: 44 U/L (ref 10–44)
ANION GAP SERPL CALC-SCNC: 10 MMOL/L (ref 8–16)
ANION GAP SERPL CALC-SCNC: 12 MMOL/L (ref 8–16)
APTT PPP: 29.6 SEC (ref 21–32)
AST SERPL-CCNC: 105 U/L (ref 10–40)
BILIRUB SERPL-MCNC: 0.4 MG/DL (ref 0.1–1)
BUN SERPL-MCNC: 42 MG/DL (ref 6–20)
BUN SERPL-MCNC: 45 MG/DL (ref 6–20)
CALCIUM SERPL-MCNC: 7.1 MG/DL (ref 8.7–10.5)
CALCIUM SERPL-MCNC: 8 MG/DL (ref 8.7–10.5)
CHLORIDE SERPL-SCNC: 102 MMOL/L (ref 95–110)
CHLORIDE SERPL-SCNC: 106 MMOL/L (ref 95–110)
CO2 SERPL-SCNC: 21 MMOL/L (ref 23–29)
CO2 SERPL-SCNC: 21 MMOL/L (ref 23–29)
CREAT SERPL-MCNC: 1.1 MG/DL (ref 0.5–1.4)
CREAT SERPL-MCNC: 1.3 MG/DL (ref 0.5–1.4)
DELSYS: ABNORMAL
ERYTHROCYTE [DISTWIDTH] IN BLOOD BY AUTOMATED COUNT: 14.6 % (ref 11.5–14.5)
EST. GFR  (NO RACE VARIABLE): 50.4 ML/MIN/1.73 M^2
EST. GFR  (NO RACE VARIABLE): >60 ML/MIN/1.73 M^2
FINAL PATHOLOGIC DIAGNOSIS: NORMAL
FIO2: 40
GLUCOSE SERPL-MCNC: 123 MG/DL (ref 70–110)
GLUCOSE SERPL-MCNC: 128 MG/DL (ref 70–110)
GROSS: NORMAL
HCO3 UR-SCNC: 20.3 MMOL/L (ref 24–28)
HCT VFR BLD AUTO: 25.6 % (ref 37–48.5)
HCT VFR BLD CALC: 25 %PCV (ref 36–54)
HGB BLD-MCNC: 8.7 G/DL (ref 12–16)
INR PPP: 1.1 (ref 0.8–1.2)
Lab: NORMAL
MAGNESIUM SERPL-MCNC: 2.1 MG/DL (ref 1.6–2.6)
MAGNESIUM SERPL-MCNC: 2.4 MG/DL (ref 1.6–2.6)
MCH RBC QN AUTO: 30.9 PG (ref 27–31)
MCHC RBC AUTO-ENTMCNC: 34 G/DL (ref 32–36)
MCV RBC AUTO: 91 FL (ref 82–98)
MODE: ABNORMAL
PCO2 BLDA: 38.3 MMHG (ref 35–45)
PH SMN: 7.33 [PH] (ref 7.35–7.45)
PHOSPHATE SERPL-MCNC: 3.4 MG/DL (ref 2.7–4.5)
PHOSPHATE SERPL-MCNC: 5 MG/DL (ref 2.7–4.5)
PLATELET # BLD AUTO: 140 K/UL (ref 150–450)
PMV BLD AUTO: 11.6 FL (ref 9.2–12.9)
PO2 BLDA: 144 MMHG (ref 80–100)
POC BE: -6 MMOL/L
POC IONIZED CALCIUM: 1.1 MMOL/L (ref 1.06–1.42)
POC SATURATED O2: 99 % (ref 95–100)
POC TCO2: 21 MMOL/L (ref 23–27)
POCT GLUCOSE: 105 MG/DL (ref 70–110)
POCT GLUCOSE: 132 MG/DL (ref 70–110)
POCT GLUCOSE: 143 MG/DL (ref 70–110)
POCT GLUCOSE: 151 MG/DL (ref 70–110)
POTASSIUM BLD-SCNC: 4.1 MMOL/L (ref 3.5–5.1)
POTASSIUM SERPL-SCNC: 3.4 MMOL/L (ref 3.5–5.1)
POTASSIUM SERPL-SCNC: 4.4 MMOL/L (ref 3.5–5.1)
PROT SERPL-MCNC: 5.5 G/DL (ref 6–8.4)
PROTHROMBIN TIME: 12.2 SEC (ref 9–12.5)
RBC # BLD AUTO: 2.82 M/UL (ref 4–5.4)
SAMPLE: ABNORMAL
SITE: ABNORMAL
SODIUM BLD-SCNC: 135 MMOL/L (ref 136–145)
SODIUM SERPL-SCNC: 135 MMOL/L (ref 136–145)
SODIUM SERPL-SCNC: 137 MMOL/L (ref 136–145)
SP02: 99
WBC # BLD AUTO: 12.45 K/UL (ref 3.9–12.7)

## 2023-11-16 PROCEDURE — 80053 COMPREHEN METABOLIC PANEL: CPT | Performed by: STUDENT IN AN ORGANIZED HEALTH CARE EDUCATION/TRAINING PROGRAM

## 2023-11-16 PROCEDURE — 27100171 HC OXYGEN HIGH FLOW UP TO 24 HOURS

## 2023-11-16 PROCEDURE — 84295 ASSAY OF SERUM SODIUM: CPT

## 2023-11-16 PROCEDURE — 97530 THERAPEUTIC ACTIVITIES: CPT

## 2023-11-16 PROCEDURE — 20000000 HC ICU ROOM

## 2023-11-16 PROCEDURE — 94640 AIRWAY INHALATION TREATMENT: CPT

## 2023-11-16 PROCEDURE — 85027 COMPLETE CBC AUTOMATED: CPT | Performed by: STUDENT IN AN ORGANIZED HEALTH CARE EDUCATION/TRAINING PROGRAM

## 2023-11-16 PROCEDURE — 25000003 PHARM REV CODE 250: Performed by: THORACIC SURGERY (CARDIOTHORACIC VASCULAR SURGERY)

## 2023-11-16 PROCEDURE — 84100 ASSAY OF PHOSPHORUS: CPT | Mod: 91 | Performed by: THORACIC SURGERY (CARDIOTHORACIC VASCULAR SURGERY)

## 2023-11-16 PROCEDURE — 80048 BASIC METABOLIC PNL TOTAL CA: CPT | Mod: XB | Performed by: THORACIC SURGERY (CARDIOTHORACIC VASCULAR SURGERY)

## 2023-11-16 PROCEDURE — 83735 ASSAY OF MAGNESIUM: CPT | Performed by: THORACIC SURGERY (CARDIOTHORACIC VASCULAR SURGERY)

## 2023-11-16 PROCEDURE — 99232 PR SUBSEQUENT HOSPITAL CARE,LEVL II: ICD-10-PCS | Mod: ,,, | Performed by: PHYSICIAN ASSISTANT

## 2023-11-16 PROCEDURE — 37799 UNLISTED PX VASCULAR SURGERY: CPT

## 2023-11-16 PROCEDURE — 25000003 PHARM REV CODE 250: Performed by: STUDENT IN AN ORGANIZED HEALTH CARE EDUCATION/TRAINING PROGRAM

## 2023-11-16 PROCEDURE — 96372 THER/PROPH/DIAG INJ SC/IM: CPT

## 2023-11-16 PROCEDURE — 25000003 PHARM REV CODE 250

## 2023-11-16 PROCEDURE — 94660 CPAP INITIATION&MGMT: CPT

## 2023-11-16 PROCEDURE — 82803 BLOOD GASES ANY COMBINATION: CPT

## 2023-11-16 PROCEDURE — 85730 THROMBOPLASTIN TIME PARTIAL: CPT | Performed by: STUDENT IN AN ORGANIZED HEALTH CARE EDUCATION/TRAINING PROGRAM

## 2023-11-16 PROCEDURE — 85610 PROTHROMBIN TIME: CPT | Performed by: STUDENT IN AN ORGANIZED HEALTH CARE EDUCATION/TRAINING PROGRAM

## 2023-11-16 PROCEDURE — 82962 GLUCOSE BLOOD TEST: CPT

## 2023-11-16 PROCEDURE — 82330 ASSAY OF CALCIUM: CPT

## 2023-11-16 PROCEDURE — 85014 HEMATOCRIT: CPT

## 2023-11-16 PROCEDURE — 63600175 PHARM REV CODE 636 W HCPCS: Performed by: STUDENT IN AN ORGANIZED HEALTH CARE EDUCATION/TRAINING PROGRAM

## 2023-11-16 PROCEDURE — 84132 ASSAY OF SERUM POTASSIUM: CPT

## 2023-11-16 PROCEDURE — 99232 SBSQ HOSP IP/OBS MODERATE 35: CPT | Mod: ,,, | Performed by: PHYSICIAN ASSISTANT

## 2023-11-16 PROCEDURE — 99291 CRITICAL CARE FIRST HOUR: CPT | Mod: ,,, | Performed by: ANESTHESIOLOGY

## 2023-11-16 PROCEDURE — 63600175 PHARM REV CODE 636 W HCPCS: Performed by: THORACIC SURGERY (CARDIOTHORACIC VASCULAR SURGERY)

## 2023-11-16 PROCEDURE — 63600175 PHARM REV CODE 636 W HCPCS

## 2023-11-16 PROCEDURE — 94761 N-INVAS EAR/PLS OXIMETRY MLT: CPT | Mod: XB

## 2023-11-16 PROCEDURE — 99291 PR CRITICAL CARE, E/M 30-74 MINUTES: ICD-10-PCS | Mod: ,,, | Performed by: ANESTHESIOLOGY

## 2023-11-16 PROCEDURE — 97535 SELF CARE MNGMENT TRAINING: CPT

## 2023-11-16 PROCEDURE — 99900035 HC TECH TIME PER 15 MIN (STAT)

## 2023-11-16 RX ORDER — OXYCODONE HYDROCHLORIDE 5 MG/1
5 TABLET ORAL EVERY 4 HOURS PRN
Status: DISCONTINUED | OUTPATIENT
Start: 2023-11-16 | End: 2023-11-19 | Stop reason: HOSPADM

## 2023-11-16 RX ORDER — AMIODARONE HYDROCHLORIDE 200 MG/1
400 TABLET ORAL 2 TIMES DAILY
Status: DISCONTINUED | OUTPATIENT
Start: 2023-11-16 | End: 2023-11-19 | Stop reason: HOSPADM

## 2023-11-16 RX ORDER — POLYETHYLENE GLYCOL 3350 17 G/17G
17 POWDER, FOR SOLUTION ORAL 2 TIMES DAILY
Status: DISCONTINUED | OUTPATIENT
Start: 2023-11-16 | End: 2023-11-19 | Stop reason: HOSPADM

## 2023-11-16 RX ORDER — SODIUM CHLORIDE 9 MG/ML
INJECTION, SOLUTION INTRAVENOUS
Status: DISCONTINUED | OUTPATIENT
Start: 2023-11-16 | End: 2023-11-19 | Stop reason: HOSPADM

## 2023-11-16 RX ORDER — OXYCODONE HYDROCHLORIDE 10 MG/1
10 TABLET ORAL EVERY 4 HOURS PRN
Status: DISCONTINUED | OUTPATIENT
Start: 2023-11-16 | End: 2023-11-19 | Stop reason: HOSPADM

## 2023-11-16 RX ORDER — RISPERIDONE 1 MG/1
2 TABLET ORAL 2 TIMES DAILY
Status: DISCONTINUED | OUTPATIENT
Start: 2023-11-16 | End: 2023-11-19 | Stop reason: HOSPADM

## 2023-11-16 RX ORDER — SODIUM,POTASSIUM PHOSPHATES 280-250MG
2 POWDER IN PACKET (EA) ORAL
Status: DISCONTINUED | OUTPATIENT
Start: 2023-11-16 | End: 2023-11-19 | Stop reason: HOSPADM

## 2023-11-16 RX ORDER — AMOXICILLIN 250 MG
1 CAPSULE ORAL 2 TIMES DAILY
Status: DISCONTINUED | OUTPATIENT
Start: 2023-11-16 | End: 2023-11-19 | Stop reason: HOSPADM

## 2023-11-16 RX ADMIN — POTASSIUM BICARBONATE 35 MEQ: 391 TABLET, EFFERVESCENT ORAL at 05:11

## 2023-11-16 RX ADMIN — MUPIROCIN: 20 OINTMENT TOPICAL at 08:11

## 2023-11-16 RX ADMIN — RISPERIDONE 2 MG: 1 TABLET ORAL at 08:11

## 2023-11-16 RX ADMIN — HEPARIN SODIUM 5000 UNITS: 5000 INJECTION INTRAVENOUS; SUBCUTANEOUS at 02:11

## 2023-11-16 RX ADMIN — ELVITEGRAVIR, COBICISTAT, EMTRICITABINE, AND TENOFOVIR ALAFENAMIDE 1 TABLET: 150; 150; 200; 10 TABLET ORAL at 09:11

## 2023-11-16 RX ADMIN — SODIUM CHLORIDE 10 MG/HR: 9 INJECTION, SOLUTION INTRAVENOUS at 11:11

## 2023-11-16 RX ADMIN — RISPERIDONE 2 MG: 1 TABLET ORAL at 09:11

## 2023-11-16 RX ADMIN — ACETAMINOPHEN 1000 MG: 500 TABLET ORAL at 10:11

## 2023-11-16 RX ADMIN — POLYETHYLENE GLYCOL 3350 17 G: 17 POWDER, FOR SOLUTION ORAL at 09:11

## 2023-11-16 RX ADMIN — AMIODARONE HYDROCHLORIDE 400 MG: 200 TABLET ORAL at 08:11

## 2023-11-16 RX ADMIN — METHOCARBAMOL 500 MG: 500 TABLET ORAL at 02:11

## 2023-11-16 RX ADMIN — AMIODARONE HYDROCHLORIDE 400 MG: 200 TABLET ORAL at 09:11

## 2023-11-16 RX ADMIN — AMIODARONE HYDROCHLORIDE 0.5 MG/MIN: 1.8 INJECTION, SOLUTION INTRAVENOUS at 04:11

## 2023-11-16 RX ADMIN — ASPIRIN 325 MG ORAL TABLET 325 MG: 325 PILL ORAL at 09:11

## 2023-11-16 RX ADMIN — ACETAMINOPHEN 1000 MG: 500 TABLET ORAL at 05:11

## 2023-11-16 RX ADMIN — HEPARIN SODIUM 5000 UNITS: 5000 INJECTION INTRAVENOUS; SUBCUTANEOUS at 10:11

## 2023-11-16 RX ADMIN — FAMOTIDINE 20 MG: 20 TABLET ORAL at 09:11

## 2023-11-16 RX ADMIN — ACETAMINOPHEN 1000 MG: 500 TABLET ORAL at 02:11

## 2023-11-16 RX ADMIN — LIDOCAINE 1 PATCH: 50 PATCH CUTANEOUS at 09:11

## 2023-11-16 RX ADMIN — POLYETHYLENE GLYCOL 3350 17 G: 17 POWDER, FOR SOLUTION ORAL at 08:11

## 2023-11-16 RX ADMIN — ONDANSETRON 4 MG: 2 INJECTION INTRAMUSCULAR; INTRAVENOUS at 08:11

## 2023-11-16 RX ADMIN — HEPARIN SODIUM 5000 UNITS: 5000 INJECTION INTRAVENOUS; SUBCUTANEOUS at 05:11

## 2023-11-16 RX ADMIN — SENNOSIDES AND DOCUSATE SODIUM 1 TABLET: 8.6; 5 TABLET ORAL at 08:11

## 2023-11-16 RX ADMIN — METHOCARBAMOL 500 MG: 500 TABLET ORAL at 08:11

## 2023-11-16 RX ADMIN — MONTELUKAST 10 MG: 10 TABLET, FILM COATED ORAL at 09:11

## 2023-11-16 RX ADMIN — DULOXETINE HYDROCHLORIDE 60 MG: 60 CAPSULE, DELAYED RELEASE ORAL at 09:11

## 2023-11-16 RX ADMIN — FLUTICASONE FUROATE AND VILANTEROL TRIFENATATE 1 PUFF: 200; 25 POWDER RESPIRATORY (INHALATION) at 08:11

## 2023-11-16 RX ADMIN — MUPIROCIN: 20 OINTMENT TOPICAL at 09:11

## 2023-11-16 RX ADMIN — METHOCARBAMOL 500 MG: 500 TABLET ORAL at 09:11

## 2023-11-16 RX ADMIN — OXYCODONE HYDROCHLORIDE 10 MG: 10 TABLET ORAL at 04:11

## 2023-11-16 RX ADMIN — SENNOSIDES AND DOCUSATE SODIUM 1 TABLET: 8.6; 5 TABLET ORAL at 09:11

## 2023-11-16 NOTE — ASSESSMENT & PLAN NOTE
BG goal 110-140 (CTS protocol)   No hx of DM. BG stable post surgery.  Will continue to monitor on correction. If continues to remain stable, likely will sign off soon      - Continue  /25  -POCT glucose AC/HS  -Hypoglycemia protocol    ** Please call Endocrine for any BG related issues **    Discharge planning: TBD

## 2023-11-16 NOTE — PT/OT/SLP PROGRESS
"Physical Therapy Co-Treatment    Patient Name:  Kendra Will   MRN:  [id number]  Admitting Diagnosis:  S/P aortic valve replacement   Recent Surgery: Procedure(s) (LRB):  REPLACEMENT, AORTIC VALVE, USING ROSS PROCEDURE (N/A) 3 Days Post-Op  Admit Date: 11/13/2023  Length of Stay: 3 days    Recommendations:     Discharge Recommendations: Moderate Intensity Therapy  Discharge Equipment Recommendations: walker, rolling, wheelchair, bedside commode   Barriers to discharge:  increased assist needed    Appropriate transfer level with nursing staff: assist x 2 bed <>chair for pt/nsg safety    Plan:     During this hospitalization, patient to be seen 5 x/week to address the identified rehab impairments via gait training, therapeutic activities, therapeutic exercises, neuromuscular re-education and progress towards the established goals.  Plan of Care Expires:  12/15/23  Plan of Care Reviewed with: patient, father, mother    Assessment     Kendra Will is a 48 y.o. female admitted with a medical diagnosis of S/P aortic valve replacement. Pt with poor tolerance to treatment session today. Pt alert but required max encouragement to participate in treatment session. Pt with slurred speech and eyes closed intermittently during session, requiring max tc's to maintain participation. She was able to perform x2 stands but with decreased participation noted throughout. In standing pt reporting pain with FFP, decreased knee extension, and posterior trunk lean and stating phrases "I can't stand, I can't walk, I feel dizzy, etc" while performing ADLs. She required max redirection and encouragement from therapist and family. Despite encouragement and tactile cueing pt still returning self to sitting and declining further participation in standing balance tasks. Pt will continue to benefit from skilled PT services during this hospital admit to continue to improve transfer ability and efficiency as well as continue to progress pt's " "ambulation distance and cardiopulmonary endurance to maximize pt's functional independence and return to PLOF.    Problem List: weakness, impaired endurance, impaired self care skills, impaired functional mobility, gait instability, impaired balance, impaired cognition, decreased coordination, decreased upper extremity function, decreased lower extremity function, decreased safety awareness, pain, impaired coordination, impaired fine motor, impaired skin, impaired cardiopulmonary response to activity, edema.  Rehab Prognosis: Fair; patient would benefit from acute skilled PT services to address these deficits and reach maximum level of function.      Goals:   Multidisciplinary Problems       Physical Therapy Goals          Problem: Physical Therapy    Goal Priority Disciplines Outcome Goal Variances Interventions   Physical Therapy Goal     PT, PT/OT Ongoing, Progressing     Description: Goals to be met by: 12/15/23     Patient will increase functional independence with mobility by performin. Supine to sit with Contact Guard Assistance  2. Sit to supine with Contact Guard Assistance  3. Sit to stand transfer with Contact Guard Assistance  4. Bed to chair transfer with Contact Guard Assistance using No Assistive Device  5. Gait  x 200 feet with Contact Guard Assistance using No Assistive Device.                          Subjective     RN notified prior to session. Parents present upon PT entrance into room. Patient agreeable to PT evaluation.    Chief Complaint: "I can't"  Patient/Family Comments/goals: none stated besides "let me sit down"  Pain/Comfort:  Pain Rating 1: 810  Location - Side 1: Bilateral  Location - Orientation 1: generalized  Location 1: chest  Pain Addressed 1: Reposition, Distraction, Cessation of Activity (use of PCA pump\)  Pain Rating Post-Intervention 1: 810    Objective:     Additional staff present: OT; OT for cotx due to pt's multiple medical comorbidities and functional deficits " "req'ing two skilled therapists to appropriately progress pt's musculoskeletal strength, neuromuscular control, and endurance while taking into consideration severe medical acuity in the ICU    Patient found up in chair with: telemetry, blood pressure cuff, pulse ox (continuous), central line, arterial line, peripheral IV, external pacer, ocampo catheter, PCA   Cognition:   Alert and Lethargic  Patient is oriented to Person  Command following: Easily distracted and Follows one-step verbal commands  Fluency: dysarthria  General Precautions: Standard, Cardiac fall, sternal   Orthopedic Precautions:N/A   Braces: N/A   Body mass index is 31.97 kg/m².  Oxygen Device: Nasal Cannula 2L  Vitals: /61 (BP Location: Left arm, Patient Position: Sitting)   Pulse 82   Temp 98.2 °F (36.8 °C)   Resp 12   Ht 4' 11" (1.499 m)   Wt 71.8 kg (158 lb 4.6 oz)   LMP 05/01/2018 (Approximate) Comment: Irregular periods  SpO2 (!) 94%   Breastfeeding No   BMI 31.97 kg/m²     Outcome Measures:  AM-PAC 6 CLICK MOBILITY  Turning over in bed (including adjusting bedclothes, sheets and blankets)?: 2  Sitting down on and standing up from a chair with arms (e.g., wheelchair, bedside commode, etc.): 3  Moving from lying on back to sitting on the side of the bed?: 2  Moving to and from a bed to a chair (including a wheelchair)?: 2  Need to walk in hospital room?: 2  Climbing 3-5 steps with a railing?: 2  Basic Mobility Total Score: 13     Functional Mobility:    Bed Mobility:   Pt found/returned to bedside chair    Transfers:   Sit <> Stand Transfer: contact guard assistance and of 2 persons with hand-held assist. [id number] from bedside chair    Standing Balance:  Static Standing Balance: Fair- : requires minimal assistance or UE support in order to stand without LOB  Dynamic Standing Balance: Poor+ : able to stand with minimal assistance and reach ipsilaterally. Unable to weight shift.  Assistance Level Required: Minimal " Assistance  Patient used: hand-held assist   Time: 2 x 15 seconds  Postural deviations noted: slouched posture, rounded shoulders, and posterior trunk lean  Comments: Pt requiring Min A due to posterior trunk lean and decreased participation in standing with FFP.      Gait:   Patient ambulated: 3 steps fwd   Patient required: minimal assist and of 2 persons  Patient used:  hand-held assist   Gait Pattern observed: reciprocal gait  Gait Deviation(s): unsteady gait, decreased step length, narrow base of support, decreased weight shift, flexed posture, decreased nadiya, and shuffle gait  Impairments due to: impaired balance, pain, and decreased endurance  all lines remained intact throughout ambulation trial  Comments:  Pt required min A x 2 persons to maintain balance with pt attempting to sit down prematurely. Not receptive to cues from therapist for gait quality    Education:  Time provided for education, counseling and discussion of health disposition in regards to patient's current status  All questions answered within PT scope of practice and to patient's satisfaction  PT role in POC to address current functional deficits  Pt educated on proper body mechanics, safety techniques, and energy conservation with PT facilitation and cueing throughout session  Call nursing/pct to transfer to chair/use bathroom. Pt stated understanding.  Whiteboard updated with therapist name and pt's current mobility status documented above  Safe to perform step transfer to/from chair/bedside commode assist x 2 and HHA w/ nursing/PCT present  Importance of OOB tolerance prn hrs/day to improve lung ventilation and expansion as well as strengthen postural musculature  Sternal Precautions: patient able to voice 0/3 precautions without assistance. Patient able to maintain precautions throughout session with max verbal cues.    Patient left up in chair with all lines intact, call button in reach, and RN and family present.    Time Tracking:      PT Received On: 11/16/23  PT Start Time: 1126     PT Stop Time: 1141  PT Total Time (min): 15 min     Billable Minutes:   Therapeutic Activity 15 minutes    Treatment Type: Treatment  PT/PTA: PT       11/16/2023

## 2023-11-16 NOTE — SUBJECTIVE & OBJECTIVE
Interval History/Significant Events: NAEON. Weaned off vaso and epi last night. Mentation seems better this morning. She has been getting out of bed to the chair and working with PT.     Follow-up For: Procedure(s) (LRB):  REPLACEMENT, AORTIC VALVE, USING ROSS PROCEDURE (N/A)    Post-Operative Day: 3 Days Post-Op    Objective:     Vital Signs (Most Recent):  Temp: 98.2 °F (36.8 °C) (11/16/23 0715)  Pulse: 71 (11/16/23 0715)  Resp: 20 (11/15/23 1525)  BP: (!) 101/58 (11/15/23 1500)  SpO2: 95 % (11/16/23 0715) Vital Signs (24h Range):  Temp:  [97.7 °F (36.5 °C)-99.5 °F (37.5 °C)] 98.2 °F (36.8 °C)  Pulse:  [62-98] 71  Resp:  [20-30] 20  SpO2:  [93 %-100 %] 95 %  BP: ()/(53-66) 101/58  Arterial Line BP: ()/(45-65) 98/52     Weight: 71.8 kg (158 lb 4.6 oz)  Body mass index is 31.97 kg/m².      Intake/Output Summary (Last 24 hours) at 11/16/2023 0744  Last data filed at 11/16/2023 0700  Gross per 24 hour   Intake 1090.12 ml   Output 2017 ml   Net -926.88 ml          Physical Exam  Vitals and nursing note reviewed.   Constitutional:       General: She is not in acute distress.     Appearance: She is not ill-appearing or toxic-appearing.   HENT:      Head: Normocephalic and atraumatic.      Right Ear: External ear normal.      Left Ear: External ear normal.      Nose: Nose normal.   Eyes:      General:         Right eye: No discharge.         Left eye: No discharge.   Cardiovascular:      Rate and Rhythm: Normal rate and regular rhythm.      Pulses: Normal pulses.      Heart sounds: Normal heart sounds. No murmur heard.     No gallop.   Pulmonary:      Effort: Pulmonary effort is normal. No accessory muscle usage or respiratory distress.      Breath sounds: Normal breath sounds. No wheezing or rales.   Abdominal:      General: Abdomen is flat. There is no distension.      Palpations: Abdomen is soft.      Tenderness: There is no abdominal tenderness. There is no guarding.   Musculoskeletal:      Right lower  leg: No edema.      Left lower leg: No edema.   Skin:     General: Skin is warm and dry.   Neurological:      Mental Status: She is alert and oriented to person, place, and time.      Motor: No weakness.   Psychiatric:         Attention and Perception: She is inattentive.         Speech: Speech is tangential.         Behavior: Behavior is not aggressive or combative.            Vents:  Vent Mode: Spont (11/14/23 0710)  Set Rate: 26 BPM (11/14/23 0300)  Vt Set: 350 mL (11/14/23 0300)  Pressure Support: 8 cmH20 (11/14/23 0710)  PEEP/CPAP: 5 cmH20 (11/14/23 0710)  Oxygen Concentration (%): 40 (11/16/23 0400)  Peak Airway Pressure: 14 cmH20 (11/14/23 0710)  Plateau Pressure: 0 cmH20 (11/14/23 0710)  Total Ve: 5.42 L/m (11/14/23 0710)  Negative Inspiratory Force (cm H2O): -3.2 (11/14/23 0710)  F/VT Ratio<105 (RSBI): (!) 58.14 (11/14/23 0425)    Lines/Drains/Airways       Central Venous Catheter Line  Duration             Percutaneous Central Line Insertion/Assessment - Triple Lumen  11/13/23 Internal Jugular Right 3 days    Introducer 11/13/23 0750 Internal Jugular Right 2 days              Drain  Duration                  Urethral Catheter 11/13/23 0715 Silicone;Straight-tip;Temperature probe;Non-latex 14 Fr. 3 days         Y Chest Tube 1 and 2 11/13/23 1415 1 Right Mediastinal 19 Fr. 2 Left Mediastinal 19 Fr. 2 days              Arterial Line  Duration             Arterial Line 11/13/23 0749 Left Radial 2 days              Line  Duration                  Pacer Wires 11/13/23 1415 2 days              Peripheral Intravenous Line  Duration                  Peripheral IV - Single Lumen 11/13/23 0628 20 G Anterior;Right Forearm 3 days         Peripheral IV - Single Lumen 11/13/23 16 G Posterior;Right Hand 3 days                    Significant Labs:    CBC/Anemia Profile:  Recent Labs   Lab 11/15/23  0211 11/15/23  0352 11/15/23  1107 11/16/23  0307 11/16/23  0418   WBC 11.75  --  12.68 12.45  --    HGB 8.6*  --  8.3* 8.7*   --    HCT 25.7*   < > 25.0* 25.6* 25*   *  --  123* 140*  --    MCV 94  --  92 91  --    RDW 15.3*  --  15.1* 14.6*  --     < > = values in this interval not displayed.        Chemistries:  Recent Labs   Lab 11/15/23  0211 11/15/23  0510 11/15/23  1107 11/16/23  0307     --  134* 135*   K 5.4* 5.1 4.3 4.4     --  104 102   CO2 18*  --  19* 21*   BUN 35*  --  39* 45*   CREATININE 1.6*  --  1.6* 1.3   CALCIUM 8.4*  --  8.2* 8.0*   ALBUMIN 3.6  --  3.5 3.3*   PROT 5.4*  --  5.5* 5.5*   BILITOT 0.4  --  0.5 0.4   ALKPHOS 44*  --  46* 59   ALT 36  --  41 44   *  --  131* 105*   MG 2.3  --   --  2.4   PHOS 5.3*  --   --  5.0*       All pertinent labs within the past 24 hours have been reviewed.    Significant Imaging:  I have reviewed all pertinent imaging results/findings within the past 24 hours.

## 2023-11-16 NOTE — PROGRESS NOTES
"[facility]  Endocrinology  Progress Note    Admit Date: 2023     Reason for Consult: Management of Hyperglycemia     Surgical Procedure and Date:  23  REPLACEMENT, AORTIC VALVE, USING ROSS PROCEDURE (N/A)     Lab Results   Component Value Date    HGBA1C 5.2 2022       HPI:   Patient is a 48 y.o. 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 She has GARCIA due to her severe AS. Patient now presents for the above procedure(s). Endocrinology consulted for management of hyperglycemia.       Interval HPI:   No acute events overnight. Patient in room 17342/08386 A. Blood glucose stable. BG at goal on current insulin regimen (SSI ). Steroid use- None .   3 Days Post-Op  Renal function- Normal   Vasopressors-  None     Diet Cardiac Fluid - 1500mL     Eatin%  Nausea: No  Hypoglycemia and intervention: No  Fever: No  TPN and/or TF: No    BP (!) 101/58   Pulse 71   Temp 98.2 °F (36.8 °C) (Core Bladder)   Resp 20   Ht 4' 11" (1.499 m)   Wt 71.8 kg (158 lb 4.6 oz)   LMP 2018 (Approximate) Comment: Irregular periods  SpO2 95%   Breastfeeding No   BMI 31.97 kg/m²     Labs Reviewed and Include    Recent Labs   Lab 23  0307   *   CALCIUM 8.0*   ALBUMIN 3.3*   PROT 5.5*   *   K 4.4   CO2 21*      BUN 45*   CREATININE 1.3   ALKPHOS 59   ALT 44   *   BILITOT 0.4     Lab Results   Component Value Date    WBC 12.45 2023    HGB 8.7 (L) 2023    HCT 25 (L) 2023    MCV 91 2023     (L) 2023     No results for input(s): "TSH", "FREET4" in the last 168 hours.  Lab Results   Component Value Date    HGBA1C 5.2 2022       Nutritional status:   Body mass index is 31.97 kg/m².  Lab Results   Component Value Date    ALBUMIN 3.3 (L) 2023    ALBUMIN 3.5 " "11/15/2023    ALBUMIN 3.6 11/15/2023     No results found for: "PREALBUMIN"    Estimated Creatinine Clearance: 47.9 mL/min (based on SCr of 1.3 mg/dL).    Accu-Checks  Recent Labs     11/14/23  0407 11/14/23  0542 11/14/23  0707 11/14/23  0812 11/14/23  0900 11/14/23  1035 11/14/23  1748 11/14/23  2224 11/15/23  0209 11/15/23  2133   POCTGLUCOSE 115* 114* 114* 107 104 126* 131* 144* 158* 144*       Current Medications and/or Treatments Impacting Glycemic Control  Immunotherapy:    Immunosuppressants       None          Steroids:   Hormones (From admission, onward)      Start     Stop Route Frequency Ordered    11/14/23 1000  vasopressin (PITRESSIN) 0.2 Units/mL in dextrose 5 % (D5W) 100 mL infusion         -- IV Continuous 11/14/23 0856          Pressors:    Autonomic Drugs (From admission, onward)      Start     Stop Route Frequency Ordered    11/13/23 1530  EPINEPHrine 5 mg in dextrose 5% 250 mL infusion (premix)        Question Answer Comment   Begin at (in mcg/kg/min): 0.02    Titrate by: (in mcg/kg/min) 0.02    Titrate interval: (in minutes) 5    Titrate to maintain: (SBP or MAP or Cardiac Index) MAP    Greater than: (in mmHg) 60    Maximum dose: (in mcg/kg/min) 2        -- IV Continuous 11/13/23 1523          Hyperglycemia/Diabetes Medications:   Antihyperglycemics (From admission, onward)      Start     Stop Route Frequency Ordered    11/14/23 1010  insulin aspart U-100 pen 0-10 Units         -- SubQ As needed (PRN) 11/14/23 0911            ASSESSMENT and PLAN    Cardiac/Vascular  * S/P aortic valve replacement  Managed per primary team  Optimize BG control        Nonrheumatic aortic valve stenosis  Managed per primary team  Optimize bg control        Endocrine  Transient hyperglycemia post procedure  BG goal 110-140 (CTS protocol)   No hx of DM. BG stable post surgery.  Will continue to monitor on correction. If continues to remain stable, likely will sign off soon      - Continue  /25  -POCT glucose " AC/HS  -Hypoglycemia protocol    ** Please call Endocrine for any BG related issues **    Discharge planning: LAVERN Nicolas PA-C  Endocrinology  [facility]

## 2023-11-16 NOTE — PT/OT/SLP PROGRESS
Occupational Therapy   Co-Treatment    Name: Kendra Will  MRN: [id number]  Admitting Diagnosis:  S/P aortic valve replacement  3 Days Post-Op    Recommendations:     Discharge Recommendations: Moderate Intensity Therapy  Discharge Equipment Recommendations:  none  Barriers to discharge:   (unknown)    Assessment:     Kendra Will is a 48 y.o. female with a medical diagnosis of S/P aortic valve replacement.  She presents poorly motivated and self-limiting 2* pain. Pt was able to stand with decreased assistance, but attempting to sit prematurely. Performance deficits affecting function are impaired balance, weakness, decreased safety awareness, impaired endurance, pain, impaired self care skills, impaired functional mobility, decreased upper extremity function, gait instability, decreased lower extremity function, impaired cardiopulmonary response to activity. Pt benefits from co-treatment with PT to accommodate pt's activity tolerance and progression with therapy.    Rehab Prognosis:  Good; patient would benefit from acute skilled OT services to address these deficits and reach maximum level of function.       Plan:     Patient to be seen 5 x/week to address the above listed problems via self-care/home management, therapeutic activities, therapeutic exercises  Plan of Care Expires: 12/13/23  Plan of Care Reviewed with: patient, father, mother    Subjective     Chief Complaint: pain  Patient/Family Comments/goals: to get better and go home  Pain/Comfort:  Pain Rating 1: 8/10  Location - Orientation 1: generalized  Location 1:  (back and chest)  Pain Addressed 1: Reposition, Distraction, Cessation of Activity  Pain Rating Post-Intervention 1: 8/10    Objective:     Communicated with: RN prior to session.  Patient found up in chair with blood pressure cuff, telemetry, pulse ox (continuous), arterial line, central line, PCA upon OT entry to room.    General Precautions: Standard, fall, sternal    Orthopedic  Precautions:N/A  Braces: N/A  Respiratory Status: Nasal cannula, flow 2 L/min     Occupational Performance:     Bed Mobility:    NT     Functional Mobility/Transfers:  Patient completed Sit <> Stand Transfer with contact guard assistance and of 2 persons  with  hand-held assist   Functional Mobility: Min A x 2 persons for stepping ~3 steps forward; further distance limited 2* pt unsteady and attempting to sit prematurely    Activities of Daily Living:  Grooming: maximal assistance for oral hygiene with assist needed for set-up and initiation  Upper Body Dressing: total assistance    Lower Body Dressing: total assistance        AMPAC 6 Click ADL: 9    Treatment & Education:  Pt ed on OT POC  Pt re-ed on sternal precautions during ADL  Pt/mother ed on ROM ex's 3x daily for increased overall strength and endurance      Patient left up in chair with all lines intact, call button in reach, RN notified, and parents present    GOALS:   Multidisciplinary Problems       Occupational Therapy Goals          Problem: Occupational Therapy    Goal Priority Disciplines Outcome Interventions   Occupational Therapy Goal     OT, PT/OT Ongoing, Progressing    Description: Goals to be met by: 11/29/2023     Patient will increase functional independence with ADLs by performing:    UE Dressing with Minimal Assistance.  LE Dressing with Moderate Assistance.  Grooming while EOB with Minimal Assistance.  Toileting from bedside commode with Minimal Assistance for hygiene and clothing management.   Supine to sit with Minimal Assistance.  Stand pivot transfer with Minimal Assistance.  Toilet transfer to bedside commode with Minimal Assistance.                         Time Tracking:     OT Date of Treatment: 11/16/23  OT Start Time: 1126  OT Stop Time: 1141  OT Total Time (min): 15 min    Billable Minutes:Self Care/Home Management 12               11/16/2023

## 2023-11-16 NOTE — PROGRESS NOTES
[facility]  Critical Care - Surgery  Progress Note    Patient Name: Kendra Will  MRN: [id number]  Admission Date: 11/13/2023  Hospital Length of Stay: 3 days  Code Status: Full Code  Attending Provider: Nicola Montgomery MD  Primary Care Provider: Janett Gonzalez MD   Principal Problem: S/P aortic valve replacement    Subjective:     Hospital/ICU Course:  No notes on file    Interval History/Significant Events: NAEON. Weaned off vaso and epi last night. Mentation seems better this morning. She has been getting out of bed to the chair and working with PT.     Follow-up For: Procedure(s) (LRB):  REPLACEMENT, AORTIC VALVE, USING ROSS PROCEDURE (N/A)    Post-Operative Day: 3 Days Post-Op    Objective:     Vital Signs (Most Recent):  Temp: 98.2 °F (36.8 °C) (11/16/23 0715)  Pulse: 71 (11/16/23 0715)  Resp: 20 (11/15/23 1525)  BP: (!) 101/58 (11/15/23 1500)  SpO2: 95 % (11/16/23 0715) Vital Signs (24h Range):  Temp:  [97.7 °F (36.5 °C)-99.5 °F (37.5 °C)] 98.2 °F (36.8 °C)  Pulse:  [62-98] 71  Resp:  [20-30] 20  SpO2:  [93 %-100 %] 95 %  BP: ()/(53-66) 101/58  Arterial Line BP: ()/(45-65) 98/52     Weight: 71.8 kg (158 lb 4.6 oz)  Body mass index is 31.97 kg/m².      Intake/Output Summary (Last 24 hours) at 11/16/2023 0744  Last data filed at 11/16/2023 0700  Gross per 24 hour   Intake 1090.12 ml   Output 2017 ml   Net -926.88 ml          Physical Exam  Vitals and nursing note reviewed.   Constitutional:       General: She is not in acute distress.     Appearance: She is not ill-appearing or toxic-appearing.   HENT:      Head: Normocephalic and atraumatic.      Right Ear: External ear normal.      Left Ear: External ear normal.      Nose: Nose normal.   Eyes:      General:         Right eye: No discharge.         Left eye: No discharge.   Cardiovascular:      Rate and Rhythm: Normal rate and regular rhythm.      Pulses: Normal pulses.      Heart sounds: Normal heart sounds. No  murmur heard.     No gallop.   Pulmonary:      Effort: Pulmonary effort is normal. No accessory muscle usage or respiratory distress.      Breath sounds: Normal breath sounds. No wheezing or rales.   Abdominal:      General: Abdomen is flat. There is no distension.      Palpations: Abdomen is soft.      Tenderness: There is no abdominal tenderness. There is no guarding.   Musculoskeletal:      Right lower leg: No edema.      Left lower leg: No edema.   Skin:     General: Skin is warm and dry.   Neurological:      Mental Status: She is alert and oriented to person, place, and time.      Motor: No weakness.   Psychiatric:         Attention and Perception: She is inattentive.         Speech: Speech is tangential.         Behavior: Behavior is not aggressive or combative.            Vents:  Vent Mode: Spont (11/14/23 0710)  Set Rate: 26 BPM (11/14/23 0300)  Vt Set: 350 mL (11/14/23 0300)  Pressure Support: 8 cmH20 (11/14/23 0710)  PEEP/CPAP: 5 cmH20 (11/14/23 0710)  Oxygen Concentration (%): 40 (11/16/23 0400)  Peak Airway Pressure: 14 cmH20 (11/14/23 0710)  Plateau Pressure: 0 cmH20 (11/14/23 0710)  Total Ve: 5.42 L/m (11/14/23 0710)  Negative Inspiratory Force (cm H2O): -3.2 (11/14/23 0710)  F/VT Ratio<105 (RSBI): (!) 58.14 (11/14/23 0425)    Lines/Drains/Airways       Central Venous Catheter Line  Duration             Percutaneous Central Line Insertion/Assessment - Triple Lumen  11/13/23 Internal Jugular Right 3 days    Introducer 11/13/23 0750 Internal Jugular Right 2 days              Drain  Duration                  Urethral Catheter 11/13/23 0715 Silicone;Straight-tip;Temperature probe;Non-latex 14 Fr. 3 days         Y Chest Tube 1 and 2 11/13/23 1415 1 Right Mediastinal 19 Fr. 2 Left Mediastinal 19 Fr. 2 days              Arterial Line  Duration             Arterial Line 11/13/23 0749 Left Radial 2 days              Line  Duration                  Pacer Wires 11/13/23 1415 2 days              Peripheral  Intravenous Line  Duration                  Peripheral IV - Single Lumen 11/13/23 0628 20 G Anterior;Right Forearm 3 days         Peripheral IV - Single Lumen 11/13/23 16 G Posterior;Right Hand 3 days                    Significant Labs:    CBC/Anemia Profile:  Recent Labs   Lab 11/15/23  0211 11/15/23  0352 11/15/23  1107 11/16/23  0307 11/16/23  0418   WBC 11.75  --  12.68 12.45  --    HGB 8.6*  --  8.3* 8.7*  --    HCT 25.7*   < > 25.0* 25.6* 25*   *  --  123* 140*  --    MCV 94  --  92 91  --    RDW 15.3*  --  15.1* 14.6*  --     < > = values in this interval not displayed.        Chemistries:  Recent Labs   Lab 11/15/23  0211 11/15/23  0510 11/15/23  1107 11/16/23  0307     --  134* 135*   K 5.4* 5.1 4.3 4.4     --  104 102   CO2 18*  --  19* 21*   BUN 35*  --  39* 45*   CREATININE 1.6*  --  1.6* 1.3   CALCIUM 8.4*  --  8.2* 8.0*   ALBUMIN 3.6  --  3.5 3.3*   PROT 5.4*  --  5.5* 5.5*   BILITOT 0.4  --  0.5 0.4   ALKPHOS 44*  --  46* 59   ALT 36  --  41 44   *  --  131* 105*   MG 2.3  --   --  2.4   PHOS 5.3*  --   --  5.0*       All pertinent labs within the past 24 hours have been reviewed.    Significant Imaging:  I have reviewed all pertinent imaging results/findings within the past 24 hours.  Assessment/Plan:     Pulmonary  Asthma  Hx asthma, will restart home inhalers  - Her home inhaler will be substituted for Breo while inpatient  - PRN Duonebs    Cardiac/Vascular  * S/P aortic valve replacement  Neuro/Psych:   - Sedation: none  - Pain:  Appears comfortable. Currently endorsing chronic back pain. Her psychiatric history and prior drug use complicates pain control. 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 (She won't specify chronicity)  - Scheduled acetaminophen   - She fills flexeril regularly, we will use methocarbamol inpatient as it may be slightly less sedating for her      - We will discontinue the hydromorphone PCA, she has received  3.4 mg in the past 24 hours  - Psych: intermittently somnolent, awake. Slowed behavior.   - She has a history of bipolar, anxiety, depression. She is prescribed risperidone, but she reports she does not take this medication.   - Increase risperidone to 2mg bid            Cardiac:   - s/p Ross procedure for bicuspid aortic valve and PFO closure with Dr. Montgomery  - Postoperative MONCHO: mild right ventricular dysfunction with normal LV function  - BP Goal: MAP>60 mmHg, SBP<120 mmHg  - Inotropes/Pressors: Milrinone 0.125 - continue to wean vasopressor support.  - Anti-HTNs: Clevidipine PRN  - Rhythm: junctional rhythm, rate 70s  - Beta Blocker: Start per CTS after milrinone weaned off  - Statin: Start per CTS  -    - Had multiple episodes of atrial flutter vs. junctional rhythm 10/14 with HR in 140s. Started on amiodarone infusion. Will transition to PO today. Continue PO 400mg amiodarone bid for 7 days.      Pulmonary:   - Nasal cannula  - Goal SpO2 >92%  - Wean supplemental oxygen as tolerated  - PRN albuterol for wheezing or SOB  - Patient is prescribed Advair, however she reports she does not use this  - We will substitute Breo while she is inpatient  - Continue IS  - Chest Tubes x 2 (2 Meds) - output 75 past 24 hours, will remove today  - ABGs Q1H x 6H, Q3H x 3  - ABGS PRN      Renal  - Trend BUN/Cr   - Maintain Crooks, record strict Is/Os  - Monitor UOP 825cc/24H, Net +781cc/24H  - Started furosemide infusion, max dose 30mg/hr  - Titrate lasix gtt to UOP goal   - She has an ASTON today improving from yesterday 35/1.6, today 45/1.3  - We will continue to monitor closely     Recent Labs   Lab 11/13/23  1518 11/14/23  0340 11/15/23  0211   BUN 18 20 35*   CREATININE 0.9 0.9 1.6*          FEN / GI:   - Daily CMP, PRN K/Mag/Phos per protocol   - Replace electrolytes as needed  - Nutrition: Cardiac diet, fluid restriction  - Bowel Regimen: Miralax, docusate     ID:   - Tmax: 101.5 early 11/14 morning, fever  curve downtrend, no longer febrile  - She was febrile without a leukocytosis. Likely inflammatory in setting of recent surgical stress. Will continue to monitor.   - Abx: Complete perioperative cefazolin 2g Q8H x 5 doses  - She has a history of HIV. She reports compliance with her antivirals. Her family brought her home medication, we will continue.     Recent Labs   Lab 11/13/23  1518 11/14/23  0340 11/15/23  0211   WBC 10.55 10.92 11.75          Heme/Onc:   - Hgb 14.3 pre-operatively, slight downtrend but stable overall postoperatively  - Received 2 units of PRBCs, 950cc Cell Saver intraoperatively  - CBC daily  - ASA 325mg daily, will start pending chest tube output    Recent Labs   Lab 11/13/23  1518 11/14/23  0340 11/15/23  0211   HGB 10.3* 9.4* 8.6*    148* 107*   APTT 31.3 27.2 38.7*   INR 1.1 1.1 1.3*          Endocrine:   - CTS Goal -140  - HgbA1c:   - Endocrinology consulted for insulin management     PPx:   Feeding: Cardiac diet, fluid restriction  Analgesia/Sedation: controlled, PCA transition to PO today  Thromboembolic Prevention: Heparin 5000 tid  HOB >30: Yes  Stress Ulcer: Not indicated, famotidine PO per CTS  Glucose Control: Yes, insulin management per Endocrinology     Lines/Drains/Airway:   Art Line   Central Line   Crooks   Chest Tubes: 2 mediastinal - remove today   Pacing Wires: Temporary ventricular pacing wires- removed     Dispo/Code Status/Palliative:   - Continue SICU Care  - Full Code    Nonrheumatic aortic valve stenosis  S/p replacement. See plan for primary problem.     Oncology  Acute blood loss anemia  Hgb prior to surgery 14.3, arrived with hgb 10.3. Expected blood loss in post-operative setting. Continued slight downtrend, no s/s of continued bleeding. Will continue to monitor.     -- daily CBC  -- transfuse hgb < 7, symptomatic anemia      Endocrine  Transient hyperglycemia post procedure  - Blood glucose at goal.   - Appreciate endocrinology assistance with  management         Critical secondary to Patient has a condition that poses threat to life and bodily function: Major Cardiac Surgery     Critical care was time spent personally by me on the following activities: development of treatment plan with patient or surrogate and bedside caregivers, discussions with consultants, evaluation of patient's response to treatment, examination of patient, ordering and performing treatments and interventions, ordering and review of laboratory studies, ordering and review of radiographic studies, pulse oximetry, re-evaluation of patient's condition.  This critical care time did not overlap with that of any other provider or involve time for any procedures.     Elver Jones,   Critical Care - Surgery  [facility]

## 2023-11-16 NOTE — SUBJECTIVE & OBJECTIVE
"Interval HPI:   No acute events overnight. Patient in room 30627/66538 A. Blood glucose stable. BG at goal on current insulin regimen (SSI ). Steroid use- None .   3 Days Post-Op  Renal function- Normal   Vasopressors-  None     Diet Cardiac Fluid - 1500mL     Eatin%  Nausea: No  Hypoglycemia and intervention: No  Fever: No  TPN and/or TF: No    BP (!) 101/58   Pulse 71   Temp 98.2 °F (36.8 °C) (Core Bladder)   Resp 20   Ht 4' 11" (1.499 m)   Wt 71.8 kg (158 lb 4.6 oz)   LMP 2018 (Approximate) Comment: Irregular periods  SpO2 95%   Breastfeeding No   BMI 31.97 kg/m²     Labs Reviewed and Include    Recent Labs   Lab 23  0307   *   CALCIUM 8.0*   ALBUMIN 3.3*   PROT 5.5*   *   K 4.4   CO2 21*      BUN 45*   CREATININE 1.3   ALKPHOS 59   ALT 44   *   BILITOT 0.4     Lab Results   Component Value Date    WBC 12.45 2023    HGB 8.7 (L) 2023    HCT 25 (L) 2023    MCV 91 2023     (L) 2023     No results for input(s): "TSH", "FREET4" in the last 168 hours.  Lab Results   Component Value Date    HGBA1C 5.2 2022       Nutritional status:   Body mass index is 31.97 kg/m².  Lab Results   Component Value Date    ALBUMIN 3.3 (L) 2023    ALBUMIN 3.5 11/15/2023    ALBUMIN 3.6 11/15/2023     No results found for: "PREALBUMIN"    Estimated Creatinine Clearance: 47.9 mL/min (based on SCr of 1.3 mg/dL).    Accu-Checks  Recent Labs     23  0407 23  0542 23  0707 23  0812 23  0900 23  1035 23  1748 23  2224 11/15/23  0209 11/15/23  2133   POCTGLUCOSE 115* 114* 114* 107 104 126* 131* 144* 158* 144*       Current Medications and/or Treatments Impacting Glycemic Control  Immunotherapy:    Immunosuppressants       None          Steroids:   Hormones (From admission, onward)      Start     Stop Route Frequency Ordered    23 1000  vasopressin (PITRESSIN) 0.2 Units/mL in dextrose 5 % (D5W) " 100 mL infusion         -- IV Continuous 11/14/23 0856          Pressors:    Autonomic Drugs (From admission, onward)      Start     Stop Route Frequency Ordered    11/13/23 1530  EPINEPHrine 5 mg in dextrose 5% 250 mL infusion (premix)        Question Answer Comment   Begin at (in mcg/kg/min): 0.02    Titrate by: (in mcg/kg/min) 0.02    Titrate interval: (in minutes) 5    Titrate to maintain: (SBP or MAP or Cardiac Index) MAP    Greater than: (in mmHg) 60    Maximum dose: (in mcg/kg/min) 2        -- IV Continuous 11/13/23 1523          Hyperglycemia/Diabetes Medications:   Antihyperglycemics (From admission, onward)      Start     Stop Route Frequency Ordered    11/14/23 1010  insulin aspart U-100 pen 0-10 Units         -- SubQ As needed (PRN) 11/14/23 0911

## 2023-11-16 NOTE — PLAN OF CARE
"      SICU PLAN OF CARE NOTE    Dx: S/P aortic valve replacement    Shift Events: NAEON    Goals of Care: MAP >60, SBP <120    Neuro: AAO x4, Follows Commands, and Moves All Extremities    Vital Signs: BP (!) 101/58   Pulse 67   Temp 97.9 °F (36.6 °C)   Resp 20   Ht 4' 11" (1.499 m)   Wt 74.5 kg (164 lb 3.9 oz)   LMP 05/01/2018 (Approximate) Comment: Irregular periods  SpO2 100%   Breastfeeding No   BMI 33.17 kg/m²     Cardiac:NSR    Respiratory: Nasal Cannula 2L/50% bipap at night    Diet: Cardiac Diet 1500 ccFR    Gtts: Vasopressin, Amiodarone, Lasix, Milrinone, and PCA    Urine Output: Urinary Catheter 1012 cc/shift    Drains: Chest Tube, total output 35 cc /  shift     Labs/Accuchecks: Daily labs/ q6 accuchecks.    Skin: Bed plugged in with pads and foams in place. Pt able to weight shift and assisted with turns as needed. Medsternal incision, pacer wires, and chest tubes noted during assessment. See flowsheet for skin and line details.      "

## 2023-11-17 LAB
ALBUMIN SERPL BCP-MCNC: 3.2 G/DL (ref 3.5–5.2)
ALP SERPL-CCNC: 69 U/L (ref 55–135)
ALT SERPL W/O P-5'-P-CCNC: 119 U/L (ref 10–44)
ANION GAP SERPL CALC-SCNC: 12 MMOL/L (ref 8–16)
APTT PPP: 27.1 SEC (ref 21–32)
ASCENDING AORTA: 3.44 CM
AST SERPL-CCNC: 177 U/L (ref 10–40)
AV INDEX (PROSTH): 0.45
AV MEAN GRADIENT: 9 MMHG
AV PEAK GRADIENT: 16 MMHG
AV VALVE AREA BY VELOCITY RATIO: 1.41 CM²
AV VALVE AREA: 1.43 CM²
AV VELOCITY RATIO: 0.44
BILIRUB SERPL-MCNC: 0.6 MG/DL (ref 0.1–1)
BSA FOR ECHO PROCEDURE: 1.76 M2
BUN SERPL-MCNC: 39 MG/DL (ref 6–20)
CALCIUM SERPL-MCNC: 8.1 MG/DL (ref 8.7–10.5)
CHLORIDE SERPL-SCNC: 97 MMOL/L (ref 95–110)
CO2 SERPL-SCNC: 27 MMOL/L (ref 23–29)
CREAT SERPL-MCNC: 1.2 MG/DL (ref 0.5–1.4)
CV ECHO LV RWT: 0.5 CM
DOP CALC AO PEAK VEL: 2 M/S
DOP CALC AO VTI: 34.56 CM
DOP CALC LVOT AREA: 3.2 CM2
DOP CALC LVOT DIAMETER: 2.02 CM
DOP CALC LVOT PEAK VEL: 0.88 M/S
DOP CALC LVOT STROKE VOLUME: 49.33 CM3
DOP CALCLVOT PEAK VEL VTI: 15.4 CM
E WAVE DECELERATION TIME: 165.57 MSEC
E/A RATIO: 3.22
E/E' RATIO: 14.5 M/S
ECHO LV POSTERIOR WALL: 0.98 CM (ref 0.6–1.1)
EJECTION FRACTION: 55 %
ERYTHROCYTE [DISTWIDTH] IN BLOOD BY AUTOMATED COUNT: 14 % (ref 11.5–14.5)
EST. GFR  (NO RACE VARIABLE): 55.5 ML/MIN/1.73 M^2
FRACTIONAL SHORTENING: 28 % (ref 28–44)
GLUCOSE SERPL-MCNC: 122 MG/DL (ref 70–110)
HCT VFR BLD AUTO: 24.1 % (ref 37–48.5)
HGB BLD-MCNC: 8.1 G/DL (ref 12–16)
INR PPP: 1.1 (ref 0.8–1.2)
INTERVENTRICULAR SEPTUM: 1.08 CM (ref 0.6–1.1)
LA MAJOR: 5.14 CM
LA MINOR: 5.02 CM
LA WIDTH: 3.89 CM
LEFT ATRIUM SIZE: 3.02 CM
LEFT ATRIUM VOLUME INDEX MOD: 28.7 ML/M2
LEFT ATRIUM VOLUME INDEX: 30 ML/M2
LEFT ATRIUM VOLUME MOD: 48.52 CM3
LEFT ATRIUM VOLUME: 50.72 CM3
LEFT INTERNAL DIMENSION IN SYSTOLE: 2.81 CM (ref 2.1–4)
LEFT VENTRICLE DIASTOLIC VOLUME INDEX: 39.08 ML/M2
LEFT VENTRICLE DIASTOLIC VOLUME: 66.04 ML
LEFT VENTRICLE MASS INDEX: 75 G/M2
LEFT VENTRICLE SYSTOLIC VOLUME INDEX: 17.7 ML/M2
LEFT VENTRICLE SYSTOLIC VOLUME: 29.92 ML
LEFT VENTRICULAR INTERNAL DIMENSION IN DIASTOLE: 3.9 CM (ref 3.5–6)
LEFT VENTRICULAR MASS: 127.39 G
LV LATERAL E/E' RATIO: 11.6 M/S
LV SEPTAL E/E' RATIO: 19.33 M/S
MAGNESIUM SERPL-MCNC: 2.3 MG/DL (ref 1.6–2.6)
MCH RBC QN AUTO: 30.7 PG (ref 27–31)
MCHC RBC AUTO-ENTMCNC: 33.6 G/DL (ref 32–36)
MCV RBC AUTO: 91 FL (ref 82–98)
MV PEAK A VEL: 0.36 M/S
MV PEAK E VEL: 1.16 M/S
PHOSPHATE SERPL-MCNC: 3 MG/DL (ref 2.7–4.5)
PISA MRMAX VEL: 0.05 M/S
PISA TR MAX VEL: 2.21 M/S
PLATELET # BLD AUTO: 149 K/UL (ref 150–450)
PMV BLD AUTO: 11.8 FL (ref 9.2–12.9)
POCT GLUCOSE: 122 MG/DL (ref 70–110)
POCT GLUCOSE: 128 MG/DL (ref 70–110)
POCT GLUCOSE: 129 MG/DL (ref 70–110)
POTASSIUM SERPL-SCNC: 3.4 MMOL/L (ref 3.5–5.1)
PROT SERPL-MCNC: 5.6 G/DL (ref 6–8.4)
PROTHROMBIN TIME: 12.1 SEC (ref 9–12.5)
RA MAJOR: 4.39 CM
RA WIDTH: 3.74 CM
RBC # BLD AUTO: 2.64 M/UL (ref 4–5.4)
RIGHT VENTRICULAR END-DIASTOLIC DIMENSION: 3.75 CM
SINUS: 3.25 CM
SODIUM SERPL-SCNC: 136 MMOL/L (ref 136–145)
STJ: 2.98 CM
TDI LATERAL: 0.1 M/S
TDI SEPTAL: 0.06 M/S
TDI: 0.08 M/S
TR MAX PG: 20 MMHG
WBC # BLD AUTO: 9.5 K/UL (ref 3.9–12.7)
Z-SCORE OF LEFT VENTRICULAR DIMENSION IN END DIASTOLE: -1.88
Z-SCORE OF LEFT VENTRICULAR DIMENSION IN END SYSTOLE: -0.29

## 2023-11-17 PROCEDURE — 25000003 PHARM REV CODE 250: Performed by: STUDENT IN AN ORGANIZED HEALTH CARE EDUCATION/TRAINING PROGRAM

## 2023-11-17 PROCEDURE — 63600175 PHARM REV CODE 636 W HCPCS: Performed by: STUDENT IN AN ORGANIZED HEALTH CARE EDUCATION/TRAINING PROGRAM

## 2023-11-17 PROCEDURE — 84100 ASSAY OF PHOSPHORUS: CPT

## 2023-11-17 PROCEDURE — 25000003 PHARM REV CODE 250

## 2023-11-17 PROCEDURE — 97116 GAIT TRAINING THERAPY: CPT

## 2023-11-17 PROCEDURE — 99291 CRITICAL CARE FIRST HOUR: CPT | Mod: ,,, | Performed by: ANESTHESIOLOGY

## 2023-11-17 PROCEDURE — 85730 THROMBOPLASTIN TIME PARTIAL: CPT | Performed by: STUDENT IN AN ORGANIZED HEALTH CARE EDUCATION/TRAINING PROGRAM

## 2023-11-17 PROCEDURE — 99900035 HC TECH TIME PER 15 MIN (STAT)

## 2023-11-17 PROCEDURE — 99232 SBSQ HOSP IP/OBS MODERATE 35: CPT | Mod: ,,, | Performed by: PHYSICIAN ASSISTANT

## 2023-11-17 PROCEDURE — 20600001 HC STEP DOWN PRIVATE ROOM

## 2023-11-17 PROCEDURE — 63600175 PHARM REV CODE 636 W HCPCS

## 2023-11-17 PROCEDURE — 99291 PR CRITICAL CARE, E/M 30-74 MINUTES: ICD-10-PCS | Mod: ,,, | Performed by: ANESTHESIOLOGY

## 2023-11-17 PROCEDURE — 83735 ASSAY OF MAGNESIUM: CPT

## 2023-11-17 PROCEDURE — 97535 SELF CARE MNGMENT TRAINING: CPT

## 2023-11-17 PROCEDURE — 97530 THERAPEUTIC ACTIVITIES: CPT

## 2023-11-17 PROCEDURE — 85027 COMPLETE CBC AUTOMATED: CPT | Performed by: STUDENT IN AN ORGANIZED HEALTH CARE EDUCATION/TRAINING PROGRAM

## 2023-11-17 PROCEDURE — 82962 GLUCOSE BLOOD TEST: CPT

## 2023-11-17 PROCEDURE — 85610 PROTHROMBIN TIME: CPT | Performed by: STUDENT IN AN ORGANIZED HEALTH CARE EDUCATION/TRAINING PROGRAM

## 2023-11-17 PROCEDURE — 94660 CPAP INITIATION&MGMT: CPT

## 2023-11-17 PROCEDURE — 25000003 PHARM REV CODE 250: Performed by: THORACIC SURGERY (CARDIOTHORACIC VASCULAR SURGERY)

## 2023-11-17 PROCEDURE — 94761 N-INVAS EAR/PLS OXIMETRY MLT: CPT

## 2023-11-17 PROCEDURE — 27000221 HC OXYGEN, UP TO 24 HOURS

## 2023-11-17 PROCEDURE — 94640 AIRWAY INHALATION TREATMENT: CPT

## 2023-11-17 PROCEDURE — 99232 PR SUBSEQUENT HOSPITAL CARE,LEVL II: ICD-10-PCS | Mod: ,,, | Performed by: PHYSICIAN ASSISTANT

## 2023-11-17 PROCEDURE — 80053 COMPREHEN METABOLIC PANEL: CPT | Performed by: STUDENT IN AN ORGANIZED HEALTH CARE EDUCATION/TRAINING PROGRAM

## 2023-11-17 RX ORDER — FUROSEMIDE 40 MG/1
40 TABLET ORAL 2 TIMES DAILY
Status: DISCONTINUED | OUTPATIENT
Start: 2023-11-17 | End: 2023-11-18

## 2023-11-17 RX ORDER — ACETAMINOPHEN 325 MG/1
650 TABLET ORAL EVERY 8 HOURS
Status: DISCONTINUED | OUTPATIENT
Start: 2023-11-17 | End: 2023-11-19 | Stop reason: HOSPADM

## 2023-11-17 RX ORDER — POTASSIUM CHLORIDE 20 MEQ/1
20 TABLET, EXTENDED RELEASE ORAL 2 TIMES DAILY
Status: DISCONTINUED | OUTPATIENT
Start: 2023-11-17 | End: 2023-11-19 | Stop reason: HOSPADM

## 2023-11-17 RX ADMIN — ACETAMINOPHEN 650 MG: 325 TABLET ORAL at 03:11

## 2023-11-17 RX ADMIN — RISPERIDONE 2 MG: 1 TABLET ORAL at 08:11

## 2023-11-17 RX ADMIN — OXYCODONE HYDROCHLORIDE 10 MG: 10 TABLET ORAL at 08:11

## 2023-11-17 RX ADMIN — AMIODARONE HYDROCHLORIDE 400 MG: 200 TABLET ORAL at 08:11

## 2023-11-17 RX ADMIN — MUPIROCIN: 20 OINTMENT TOPICAL at 08:11

## 2023-11-17 RX ADMIN — SENNOSIDES AND DOCUSATE SODIUM 1 TABLET: 8.6; 5 TABLET ORAL at 08:11

## 2023-11-17 RX ADMIN — METHOCARBAMOL 500 MG: 500 TABLET ORAL at 03:11

## 2023-11-17 RX ADMIN — FLUTICASONE FUROATE AND VILANTEROL TRIFENATATE 1 PUFF: 200; 25 POWDER RESPIRATORY (INHALATION) at 07:11

## 2023-11-17 RX ADMIN — METOPROLOL SUCCINATE 12.5 MG: 25 TABLET, EXTENDED RELEASE ORAL at 08:11

## 2023-11-17 RX ADMIN — FUROSEMIDE 40 MG: 40 TABLET ORAL at 06:11

## 2023-11-17 RX ADMIN — POTASSIUM BICARBONATE 35 MEQ: 391 TABLET, EFFERVESCENT ORAL at 04:11

## 2023-11-17 RX ADMIN — HEPARIN SODIUM 5000 UNITS: 5000 INJECTION INTRAVENOUS; SUBCUTANEOUS at 05:11

## 2023-11-17 RX ADMIN — HEPARIN SODIUM 5000 UNITS: 5000 INJECTION INTRAVENOUS; SUBCUTANEOUS at 09:11

## 2023-11-17 RX ADMIN — POLYETHYLENE GLYCOL 3350 17 G: 17 POWDER, FOR SOLUTION ORAL at 08:11

## 2023-11-17 RX ADMIN — LIDOCAINE 1 PATCH: 50 PATCH CUTANEOUS at 08:11

## 2023-11-17 RX ADMIN — ACETAMINOPHEN 1000 MG: 500 TABLET ORAL at 05:11

## 2023-11-17 RX ADMIN — METHOCARBAMOL 500 MG: 500 TABLET ORAL at 08:11

## 2023-11-17 RX ADMIN — FUROSEMIDE 40 MG: 40 TABLET ORAL at 08:11

## 2023-11-17 RX ADMIN — ELVITEGRAVIR, COBICISTAT, EMTRICITABINE, AND TENOFOVIR ALAFENAMIDE 1 TABLET: 150; 150; 200; 10 TABLET ORAL at 08:11

## 2023-11-17 RX ADMIN — ASPIRIN 325 MG ORAL TABLET 325 MG: 325 PILL ORAL at 08:11

## 2023-11-17 RX ADMIN — ACETAMINOPHEN 650 MG: 325 TABLET ORAL at 09:11

## 2023-11-17 RX ADMIN — DULOXETINE HYDROCHLORIDE 60 MG: 60 CAPSULE, DELAYED RELEASE ORAL at 08:11

## 2023-11-17 RX ADMIN — POTASSIUM CHLORIDE 20 MEQ: 1500 TABLET, EXTENDED RELEASE ORAL at 08:11

## 2023-11-17 RX ADMIN — OXYCODONE HYDROCHLORIDE 10 MG: 10 TABLET ORAL at 02:11

## 2023-11-17 RX ADMIN — MONTELUKAST 10 MG: 10 TABLET, FILM COATED ORAL at 08:11

## 2023-11-17 RX ADMIN — MILRINONE LACTATE IN DEXTROSE 0.12 MCG/KG/MIN: 200 INJECTION, SOLUTION INTRAVENOUS at 02:11

## 2023-11-17 RX ADMIN — FAMOTIDINE 20 MG: 20 TABLET ORAL at 08:11

## 2023-11-17 RX ADMIN — HEPARIN SODIUM 5000 UNITS: 5000 INJECTION INTRAVENOUS; SUBCUTANEOUS at 03:11

## 2023-11-17 NOTE — SUBJECTIVE & OBJECTIVE
Interval History/Significant Events: NAEON. Pain controlled on current oral regimen after PCA stopped. Milrinone stopped this morning. UOP at goal on furosemide infusion. Arterial line malfunctioning yesterday and removed.       Follow-up For: Procedure(s) (LRB):  REPLACEMENT, AORTIC VALVE, USING ROSS PROCEDURE (N/A)    Post-Operative Day: 4 Days Post-Op    Objective:     Vital Signs (Most Recent):  Temp: 97.5 °F (36.4 °C) (11/17/23 0300)  Pulse: 74 (11/17/23 0500)  Resp: 13 (11/17/23 0500)  BP: 112/65 (11/17/23 0500)  SpO2: (!) 93 % (11/17/23 0500) Vital Signs (24h Range):  Temp:  [97.5 °F (36.4 °C)-98.6 °F (37 °C)] 97.5 °F (36.4 °C)  Pulse:  [69-83] 74  Resp:  [10-27] 13  SpO2:  [89 %-100 %] 93 %  BP: ()/(55-73) 112/65  Arterial Line BP: ()/() 102/99     Weight: 74.4 kg (164 lb 0.4 oz)  Body mass index is 33.13 kg/m².      Intake/Output Summary (Last 24 hours) at 11/17/2023 0615  Last data filed at 11/17/2023 0500  Gross per 24 hour   Intake 880.84 ml   Output 2972 ml   Net -2091.16 ml          Physical Exam  Vitals and nursing note reviewed.   Constitutional:       General: She is not in acute distress.     Appearance: She is obese. She is not ill-appearing or toxic-appearing.   HENT:      Head: Normocephalic and atraumatic.      Right Ear: External ear normal.      Left Ear: External ear normal.      Nose: Nose normal.   Eyes:      General:         Right eye: No discharge.         Left eye: No discharge.   Cardiovascular:      Rate and Rhythm: Normal rate and regular rhythm.      Pulses: Normal pulses.      Heart sounds: Normal heart sounds. No murmur heard.     No gallop.   Pulmonary:      Effort: Pulmonary effort is normal. No accessory muscle usage or respiratory distress.      Breath sounds: Normal breath sounds. No wheezing or rales.   Abdominal:      General: Abdomen is flat. There is no distension.      Palpations: Abdomen is soft.      Tenderness: There is no abdominal tenderness. There  is no guarding.   Musculoskeletal:      Right lower leg: No edema.      Left lower leg: No edema.   Skin:     General: Skin is warm and dry.   Neurological:      Mental Status: She is alert and oriented to person, place, and time.      Motor: No weakness.   Psychiatric:         Attention and Perception: Attention normal.         Behavior: Behavior is not aggressive or combative. Behavior is cooperative.            Vents:  Vent Mode: Spont (11/14/23 0710)  Set Rate: 26 BPM (11/14/23 0300)  Vt Set: 350 mL (11/14/23 0300)  Pressure Support: 8 cmH20 (11/14/23 0710)  PEEP/CPAP: 5 cmH20 (11/14/23 0710)  Oxygen Concentration (%): 2 (11/16/23 0802)  Peak Airway Pressure: 14 cmH20 (11/14/23 0710)  Plateau Pressure: 0 cmH20 (11/14/23 0710)  Total Ve: 5.42 L/m (11/14/23 0710)  Negative Inspiratory Force (cm H2O): -3.2 (11/14/23 0710)  F/VT Ratio<105 (RSBI): (!) 58.14 (11/14/23 0425)    Lines/Drains/Airways       Central Venous Catheter Line  Duration             Percutaneous Central Line Insertion/Assessment - Triple Lumen  11/13/23 Internal Jugular Right 4 days    Introducer 11/13/23 0750 Internal Jugular Right 3 days              Drain  Duration                  Urethral Catheter 11/13/23 0715 Silicone;Straight-tip;Temperature probe;Non-latex 14 Fr. 3 days                    Significant Labs:    CBC/Anemia Profile:  Recent Labs   Lab 11/15/23  1107 11/16/23  0307 11/16/23  0418 11/17/23  0247   WBC 12.68 12.45  --  9.50   HGB 8.3* 8.7*  --  8.1*   HCT 25.0* 25.6* 25* 24.1*   * 140*  --  149*   MCV 92 91  --  91   RDW 15.1* 14.6*  --  14.0        Chemistries:  Recent Labs   Lab 11/15/23  1107 11/16/23  0307 11/16/23  1541 11/17/23  0247   * 135* 137 136   K 4.3 4.4 3.4* 3.4*    102 106 97   CO2 19* 21* 21* 27   BUN 39* 45* 42* 39*   CREATININE 1.6* 1.3 1.1 1.2   CALCIUM 8.2* 8.0* 7.1* 8.1*   ALBUMIN 3.5 3.3*  --  3.2*   PROT 5.5* 5.5*  --  5.6*   BILITOT 0.5 0.4  --  0.6   ALKPHOS 46* 59  --  69   ALT 41 44   --  119*   * 105*  --  177*   MG  --  2.4 2.1 2.3   PHOS  --  5.0* 3.4 3.0       All pertinent labs within the past 24 hours have been reviewed.    Significant Imaging:  I have reviewed all pertinent imaging results/findings within the past 24 hours.

## 2023-11-17 NOTE — ASSESSMENT & PLAN NOTE
Neuro/Psych:   - Sedation: none  - Pain: Currently endorsing chronic back pain. Her psychiatric history and prior drug use complicates pain control. 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 (She won't specify chronicity)  - Scheduled acetaminophen   - She fills flexeril regularly, we will use methocarbamol inpatient as it may be slightly less sedating for her      - Continue PRN oxycodone 5mg and 10mg PRN for acute surgical pain.   - Psych: awake, alert.   - She has a history of bipolar, anxiety, depression. She is prescribed risperidone, but she reports she does not take this medication.   - Continue risperidone to 2mg bid            Cardiac:   - s/p Ross procedure for bicuspid aortic valve and PFO closure with Dr. Montgomery  - Postoperative MONCHO: mild right ventricular dysfunction with normal LV function  - BP Goal: MAP>60 mmHg, SBP<120 mmHg  - Inotropes/Pressors: Milrinone 0.125 stopped this morning.   - Anti-HTNs: Clevidipine PRN  - Rhythm: junctional rhythm, rate 70s  - Beta Blocker: Milrinone stopped this morning, will plan to start later today.  - Statin: Start per CTS  -    - Had multiple episodes of atrial flutter vs. junctional rhythm 10/14 with HR in 140s. Started on amiodarone infusion. Will transition to PO today. Continue PO 400mg amiodarone bid for 7 days.      Pulmonary:   - Nasal cannula  - Goal SpO2 >92%  - Wean supplemental oxygen as tolerated  - PRN albuterol for wheezing or SOB  - Patient is prescribed Advair, however she reports she does not use this  - We will substitute Breo while she is inpatient  - Continue IS  - ABGS PRN      Renal  - Trend BUN/Cr   - Maintain Crooks, record strict Is/Os  - Monitor UOP 2970cc/24H, Net -2116cc/24H  - Started furosemide infusion, max dose 30mg/hr  - Titrate lasix gtt to UOP goal   - She has an ASTON today improving 39/1.2  - We will continue to monitor closely     Recent Labs   Lab 11/16/23  0308 11/16/23  0220  11/17/23  0247   BUN 45* 42* 39*   CREATININE 1.3 1.1 1.2          FEN / GI:   - Daily CMP, PRN K/Mag/Phos per protocol   - Replace electrolytes as needed  - Nutrition: Cardiac diet, fluid restriction  - Bowel Regimen: Miralax, docusate     ID:   - Tmax: 101.5 early 11/14 morning, fever curve downtrend, no longer febrile  - She was febrile without a leukocytosis. Likely inflammatory in setting of recent surgical stress. Will continue to monitor.   - Abx: Complete perioperative cefazolin 2g Q8H x 5 doses  - She has a history of HIV. She reports compliance with her antivirals. Her family brought her home medication, we will continue.     Recent Labs   Lab 11/15/23  1107 11/16/23  0307 11/17/23  0247   WBC 12.68 12.45 9.50          Heme/Onc:   - Hgb 14.3 pre-operatively, slight downtrend but stable overall postoperatively  - Received 2 units of PRBCs, 950cc Cell Saver intraoperatively  - CBC daily  - ASA 325mg daily, will start pending chest tube output    Recent Labs   Lab 11/15/23  0211 11/15/23  1107 11/16/23  0307 11/17/23  0247   HGB 8.6* 8.3* 8.7* 8.1*   * 123* 140* 149*   APTT 38.7*  --  29.6 27.1   INR 1.3*  --  1.1 1.1          Endocrine:   - CTS Goal -140  - HgbA1c:   - Endocrinology consulted for insulin management     PPx:   Feeding: Cardiac diet, fluid restriction  Analgesia/Sedation: controlled, continue PRN oxycodone  Thromboembolic Prevention: Heparin 5000 tid  HOB >30: Yes  Stress Ulcer: Not indicated, famotidine PO per CTS  Glucose Control: Yes, insulin management per Endocrinology     Lines/Drains/Airway:   Central Line   Crooks     Dispo/Code Status/Palliative:   - Continue SICU Care  - Full Code

## 2023-11-17 NOTE — ASSESSMENT & PLAN NOTE
BG goal 110-140 (CTS protocol)   No hx of DM. BG stable post surgery.      -Given bg stability,Endocrine will sign off.  Please re-consult with any new concerns

## 2023-11-17 NOTE — PT/OT/SLP PROGRESS
Physical Therapy   Progress Note    Patient Name:  Kendra Will  MRN: [id number]    Admit Date: 11/13/2023  Admitting Diagnosis:  S/P aortic valve replacement  Length of Stay: 4 days  Recent Surgery: Procedure(s) (LRB):  REPLACEMENT, AORTIC VALVE, USING ROSS PROCEDURE (N/A) 4 Days Post-Op    Recommendations:     Discharge Recommendations: Moderate therapy intensity  Equipment recommendations: bedside commode  Barriers to discharge: Increased level of skilled assistance required    Assessment:     Kendar Will is a 48 y.o. female admitted to [facility] [place] on [place]/13/2023 with medical diagnosis of S/P aortic valve replacement. Pt presents with weakness, impaired endurance, impaired self care skills, gait instability, impaired functional mobility, impaired balance, decreased coordination, pain, decreased safety awareness, impaired cardiopulmonary response to activity. Pt is progressing towards goals, but has not yet reached prior level of function.     Pt agreeable to therapy session. Up in recliner when therapists entered room. Pt able to stand from recliner and perform standing ADLs with v/c to keep eyes open. 1 postural sway LOB requiring Emmanuel to recover. Pt then ambulated 8 ft to bed with CGA. Pt left supine for nsg to remove central line. Will continue to progress pt as tolerated. VSS during session.    Kendra Will would benefit from continued acute PT intervention to improve quality of life, focus on recovery of impairments, provide patient/caregiver education, reduce fall risk, and maximize (I) and safety with functional mobility. Once medically stable, recommending pt discharge to moderate therapy intensity. Patient continues to demonstrate the need for moderate intensity therapy on a daily basis post acute exhibited by decreased independence with functional mobility  .      Rehab Prognosis: Good    Plan:     During this hospitalization, patient to be seen 5 x/week to address the identified rehab impairments via  "gait training, therapeutic activities, therapeutic exercises, neuromuscular re-education and progress towards stated goals.     Plan of Care Expires:  12/15/23  Plan of Care reviewed with: patient    This plan of care has been discussed with the patient/caregiver, who was included in its development and is in agreement with the identified goals and treatment plan.     Subjective     Communicated with RN prior to session.  Patient found up in chair upon PT entry to room, agreeable to therapy session. Pt alone during session.    Patient/Family Comments/goals: "I am keeping my eyes open!"    Pain/Comfort:  Pain Rating 1: 8/10  Location - Side 1: Bilateral  Location - Orientation 1: generalized  Location 1: chest  Pain Addressed 1: Reposition, Distraction, Cessation of Activity  Pain Rating Post-Intervention 1: 8/10    Patients cultural, spiritual, Rastafarian conflicts given the current situation: None identified     Objective:   OT present for cotreat due to pt's multiple medical comorbidities and functional/cognition deficits requiring two skilled therapists to appropriately progress pt's musculoskeletal strength, neuromuscular control, and endurance while taking into consideration medical acuity and pt safety.    Patient found with: telemetry, blood pressure cuff, pulse ox (continuous), central line, ocampo catheter, peripheral IV    General Precautions: Standard, fall, sternal   Orthopedic Precautions:N/A   Braces: N/A   Oxygen Device: room air    Cognition:  Pt is Alert during session.    Therapist provided skilled verbal and tactile cueing to facilitate the following functional mobility tasks. Listed tasks are focused on recovery of impairments and improving pt's independence and efficiency with bed mobility, transfers and ambulation as able.     Bed Mobility:  Sit > Supine: Moderate Assistance    Transfers:   Sit <> Stand Transfer: Contact Guard Assistance from recliner with no AD x2 trials               "   Gait:  Distance: 8 ft  Assistance level: Contact Guard Assistance  Assistive Device: none  Gait Assessment: decreased step length , decreased nadiya, decreased gait speed, and reduced reciprocal arm swing     Balance:  Dynamic Sitting: GOOD: Maintains balance through MODERATE excursions of active trunk movement  Standing:  Static: FAIR+: Takes MINIMAL challenges from all directions   Dynamic: FAIR: Needs CONTACT GUARD during gait    Outcome Measure: AM-PAC 6 CLICK MOBILITY  Total Score:17     Patient/Caregiver Education and Additional Therapeutic Activities/Exercises   Therapist reinforced education re:  - pt able to recall 2/3 sternal precautions  Post-op sternal precautions, including no lifting > 5 lbs, pulling or pushing with BUEs.    Modifying daily activities and functional mobility tasks to maintain sternal precautions appropriately   Importance of participation in therapy and engaging in increased OOB mobility with assistance to improve endurance       Provided pt/caregiver education regarding:   PT POC and goals for therapy   Safety with mobility and fall risk   Safe management of AD as needed   Energy conservation techniques   Instruction on use of call button and importance of calling nursing staff for assistance with mobility     Patient/caregiver able to verbalize understanding; will follow-up with pt/caregiver during current admit for additional questions/concerns within scope of practice.     White board updated.     Patient left supine with all lines intact, call button in reach, and nsg present.    Goals:     Multidisciplinary Problems       Physical Therapy Goals          Problem: Physical Therapy    Goal Priority Disciplines Outcome Goal Variances Interventions   Physical Therapy Goal     PT, PT/OT Ongoing, Progressing     Description: Goals to be met by: 12/15/23     Patient will increase functional independence with mobility by performin. Supine to sit with Contact Guard Assistance  2.  Sit to supine with Contact Guard Assistance  3. Sit to stand transfer with Contact Guard Assistance  4. Bed to chair transfer with Contact Guard Assistance using No Assistive Device  5. Gait  x 200 feet with Contact Guard Assistance using No Assistive Device.                          Time Tracking:       PT Received On: 11/17/23  PT Start Time: 0837     PT Stop Time: 0900  PT Total Time (min): 23 min     Billable Minutes: Gait Training 10 and Therapeutic Activity 13    11/17/2023

## 2023-11-17 NOTE — PLAN OF CARE
SICU PLAN OF CARE NOTE    Dx: S/P aortic valve replacement    Goals of Care: MAP > 65, SBP < 120, UOP goal , work with PT/OT    Vital Signs (last 12 hours):   Temp:  [98 °F (36.7 °C)-98.4 °F (36.9 °C)]   Pulse:  [69-83]   Resp:  [10-24]   BP: ()/(55-63)   SpO2:  [89 %-98 %]   Arterial Line BP: ()/()      Neuro: AAO x4, Follows Commands, and Moves All Extremities    Cardiac: NSR    Respiratory: Nasal Cannula    Gtts: Milrinone and Lasix    Urine Output: Urinary Catheter 1425 cc/shift    Diet: Cardiac Diet     Labs/Accuchecks: Daily labs. Accuchecks AC/HS    Skin: No evidence of breakdown. Heel boots on. Sacral and heel foams on. Pt turned q2 hours.     Shift Events: Amiodarone switched to PO. Lasix titrated to meet UOP goals. PCA pump D/C'd. Pt switched to PO pain meds. Plan of care dicussed with pt and family. Questions answered.

## 2023-11-17 NOTE — SUBJECTIVE & OBJECTIVE
"Interval HPI:   No acute events overnight. Patient in room 02450/40946 A. Blood glucose stable. BG at goal on current insulin regimen (SSI ). Steroid use- None .   4 Days Post-Op  Renal function- Normal   Vasopressors-  None      Diet Cardiac Fluid - 1500mL      Eatin%  Nausea: No  Hypoglycemia and intervention: No  Fever: No  TPN and/or TF: No    BP (!) 100/57 (BP Location: Left arm, Patient Position: Lying)   Pulse 69   Temp 98.1 °F (36.7 °C) (Oral)   Resp 18   Ht 4' 11" (1.499 m)   Wt 74.4 kg (164 lb 0.4 oz)   LMP 2018 (Approximate) Comment: Irregular periods  SpO2 97%   Breastfeeding No   BMI 33.13 kg/m²     Labs Reviewed and Include    Recent Labs   Lab 23  0247   *   CALCIUM 8.1*   ALBUMIN 3.2*   PROT 5.6*      K 3.4*   CO2 27   CL 97   BUN 39*   CREATININE 1.2   ALKPHOS 69   *   *   BILITOT 0.6     Lab Results   Component Value Date    WBC 9.50 2023    HGB 8.1 (L) 2023    HCT 24.1 (L) 2023    MCV 91 2023     (L) 2023     No results for input(s): "TSH", "FREET4" in the last 168 hours.  Lab Results   Component Value Date    HGBA1C 5.2 2022       Nutritional status:   Body mass index is 33.13 kg/m².  Lab Results   Component Value Date    ALBUMIN 3.2 (L) 2023    ALBUMIN 3.3 (L) 2023    ALBUMIN 3.5 11/15/2023     No results found for: "PREALBUMIN"    Estimated Creatinine Clearance: 52.8 mL/min (based on SCr of 1.2 mg/dL).    Accu-Checks  Recent Labs     23  0900 23  1035 23  1748 23  2224 11/15/23  0209 11/15/23  2133 23  0838 23  1237 23  1755 23   POCTGLUCOSE 104 126* 131* 144* 158* 144* 132* 105 151* 143*       Current Medications and/or Treatments Impacting Glycemic Control  Immunotherapy:    Immunosuppressants       None          Steroids:   Hormones (From admission, onward)      None          Pressors:    Autonomic Drugs (From admission, onward) "      Start     Stop Route Frequency Ordered    11/17/23 0900  metoprolol succinate (TOPROL-XL) 24 hr split tablet 12.5 mg         -- Oral Daily 11/17/23 0570          Hyperglycemia/Diabetes Medications:   Antihyperglycemics (From admission, onward)      Start     Stop Route Frequency Ordered    11/14/23 1010  insulin aspart U-100 pen 0-10 Units         -- SubQ As needed (PRN) 11/14/23 0911

## 2023-11-17 NOTE — NURSING TRANSFER
Nursing Transfer Note      11/17/2023   11:14 AM    Nurse giving handoff: Luly CARMONA RN  Nurse receiving handoff: EDILSON Shah    Reason patient is being transferred: Stepdown    Transfer To: 330    Transfer via wheelchair    Transfer with 2L NC to O2, cardiac monitoring    Transported by EDILSON Mauricio    Transfer Vital Signs:  Blood Pressure: 94/56  Heart Rate: 70  O2: 96  Temperature:98.1  Respirations:12    Telemetry: Box Number 0760, Rate 76, Rhythm Normal Sinus Rhythm, and Telemetry  Gerald  Order for Tele Monitor? Yes    Additional Lines: Oxygen    4eyes on Skin: yes    Medicines sent: Yes    Any special needs or follow-up needed: None    Patient belongings transferred with patient: Yes    Chart send with patient: Yes    Notified: Patient's mother and father present during transfer    Patient reassessed at: 11/17/23 @ 1039    Upon arrival to floor: cardiac monitor applied, patient oriented to room, call bell in reach, and bed in lowest position

## 2023-11-17 NOTE — PT/OT/SLP PROGRESS
Occupational Therapy   CoTreatment    Name: Kendra Will  MRN: [id number]  Admitting Diagnosis:  S/P aortic valve replacement  4 Days Post-Op    Recommendations:     Discharge Recommendations: Moderate Intensity Therapy    Assessment:     Kendra Will is a 48 y.o. female with a medical diagnosis of S/P aortic valve replacement.   Performance deficits affecting function are weakness, impaired endurance, impaired self care skills, impaired functional mobility, gait instability, impaired balance, pain, decreased safety awareness, decreased upper extremity function. Pt tolerated session fairly well with some progress noted. Anticipate pt to need moderate level of intensity therapy upon hospital d/c to ensure safety and independence upon d/c.     Rehab Prognosis:  Good; patient would benefit from acute skilled OT services to address these deficits and reach maximum level of function.       Plan:     Patient to be seen 5 x/week to address the above listed problems via self-care/home management, therapeutic activities, therapeutic exercises  Plan of Care Expires: 12/13/23  Plan of Care Reviewed with: patient    Subjective     Pt agreeable to therapy.     Pain/Comfort:  Pain Rating 1: 8/10  Location 1: sternal  Pain Addressed 1: Reposition, Distraction    Objective:     Communicated with: nsg prior to session.  Patient found in bed with tele, pulse ox, BP cuff, peripheral line, ocampo.   Cotx completed this date to optimize functional performance and safety given impaired tolerance for activity in setting of ICU    General Precautions: Standard, fall, sternal      Occupational Performance:     Bed Mobility:    Sit>supine with MAX A      Functional Mobility/Transfers:  Sit>stand with CGA x 2 trials from chair.     Activities of Daily Living:  Feeding: set-up  G/H: Pt completed 3 minutes of standing with CGA for  g/h skills. Pt did have large postural sway with eyes closed needing MIN A to recover. Constant cues needed to  keep eyes open.   LE dressing: MOD A ; cues needed for adaptive technique for  LB dressing with sternal precautions. Pt to benefit from additional education   UE dressing: MOD A     [facility] 6 Click ADL: 14    Treatment & Education:  Pt verb 2/3 sternal precautions and further education provided re: sternal precautions and implications on functional activity.   Pt needed constant cues to keep eyes open and to maintain attention to task.   VSS throughout session.     Education provided re: role of OT and safety with functional mobility/ADl skills.     Patient left supine with all lines intact, call button in reach, and nsg notified  Nsg requested pt return to bed to remove line in prep for t/f to floor.     GOALS:   Multidisciplinary Problems       Occupational Therapy Goals          Problem: Occupational Therapy    Goal Priority Disciplines Outcome Interventions   Occupational Therapy Goal     OT, PT/OT Ongoing, Progressing    Description: Goals to be met by: 11/29/2023     Patient will increase functional independence with ADLs by performing:    UE Dressing with Minimal Assistance.  LE Dressing with Moderate Assistance.  Grooming while EOB with Minimal Assistance.  Toileting from bedside commode with Minimal Assistance for hygiene and clothing management.   Supine to sit with Minimal Assistance.  Stand pivot transfer with Minimal Assistance.  Toilet transfer to bedside commode with Minimal Assistance.                         Time Tracking:     OT Date of Treatment: 11/17/23  OT Start Time: 0837  OT Stop Time: 0910  OT Total Time (min): 33 min    Billable Minutes:Self Care/Home Management 17  Therapeutic Activity 16    OT/KOBY: OT          11/17/2023

## 2023-11-17 NOTE — PROGRESS NOTES
[facility]  Critical Care - Surgery  Progress Note    Patient Name: Kendra Will  MRN: [id number]  Admission Date: 11/13/2023  Hospital Length of Stay: 4 days  Code Status: Full Code  Attending Provider: Nicola Montgomery MD  Primary Care Provider: Janett Gonzalez MD   Principal Problem: S/P aortic valve replacement    Subjective:     Hospital/ICU Course:  No notes on file    Interval History/Significant Events: NAEON. Pain controlled on current oral regimen after PCA stopped. Milrinone stopped this morning. UOP at goal on furosemide infusion. Arterial line malfunctioning yesterday and removed.       Follow-up For: Procedure(s) (LRB):  REPLACEMENT, AORTIC VALVE, USING ROSS PROCEDURE (N/A)    Post-Operative Day: 4 Days Post-Op    Objective:     Vital Signs (Most Recent):  Temp: 97.5 °F (36.4 °C) (11/17/23 0300)  Pulse: 74 (11/17/23 0500)  Resp: 13 (11/17/23 0500)  BP: 112/65 (11/17/23 0500)  SpO2: (!) 93 % (11/17/23 0500) Vital Signs (24h Range):  Temp:  [97.5 °F (36.4 °C)-98.6 °F (37 °C)] 97.5 °F (36.4 °C)  Pulse:  [69-83] 74  Resp:  [10-27] 13  SpO2:  [89 %-100 %] 93 %  BP: ()/(55-73) 112/65  Arterial Line BP: ()/() 102/99     Weight: 74.4 kg (164 lb 0.4 oz)  Body mass index is 33.13 kg/m².      Intake/Output Summary (Last 24 hours) at 11/17/2023 0615  Last data filed at 11/17/2023 0500  Gross per 24 hour   Intake 880.84 ml   Output 2972 ml   Net -2091.16 ml          Physical Exam  Vitals and nursing note reviewed.   Constitutional:       General: She is not in acute distress.     Appearance: She is obese. She is not ill-appearing or toxic-appearing.   HENT:      Head: Normocephalic and atraumatic.      Right Ear: External ear normal.      Left Ear: External ear normal.      Nose: Nose normal.   Eyes:      General:         Right eye: No discharge.         Left eye: No discharge.   Cardiovascular:      Rate and Rhythm: Normal rate and regular rhythm.      Pulses:  Normal pulses.      Heart sounds: Normal heart sounds. No murmur heard.     No gallop.   Pulmonary:      Effort: Pulmonary effort is normal. No accessory muscle usage or respiratory distress.      Breath sounds: Normal breath sounds. No wheezing or rales.   Abdominal:      General: Abdomen is flat. There is no distension.      Palpations: Abdomen is soft.      Tenderness: There is no abdominal tenderness. There is no guarding.   Musculoskeletal:      Right lower leg: No edema.      Left lower leg: No edema.   Skin:     General: Skin is warm and dry.   Neurological:      Mental Status: She is alert and oriented to person, place, and time.      Motor: No weakness.   Psychiatric:         Attention and Perception: Attention normal.         Behavior: Behavior is not aggressive or combative. Behavior is cooperative.            Vents:  Vent Mode: Spont (11/14/23 0710)  Set Rate: 26 BPM (11/14/23 0300)  Vt Set: 350 mL (11/14/23 0300)  Pressure Support: 8 cmH20 (11/14/23 0710)  PEEP/CPAP: 5 cmH20 (11/14/23 0710)  Oxygen Concentration (%): 2 (11/16/23 0802)  Peak Airway Pressure: 14 cmH20 (11/14/23 0710)  Plateau Pressure: 0 cmH20 (11/14/23 0710)  Total Ve: 5.42 L/m (11/14/23 0710)  Negative Inspiratory Force (cm H2O): -3.2 (11/14/23 0710)  F/VT Ratio<105 (RSBI): (!) 58.14 (11/14/23 0425)    Lines/Drains/Airways       Central Venous Catheter Line  Duration             Percutaneous Central Line Insertion/Assessment - Triple Lumen  11/13/23 Internal Jugular Right 4 days    Introducer 11/13/23 0750 Internal Jugular Right 3 days              Drain  Duration                  Urethral Catheter 11/13/23 0715 Silicone;Straight-tip;Temperature probe;Non-latex 14 Fr. 3 days                    Significant Labs:    CBC/Anemia Profile:  Recent Labs   Lab 11/15/23  1107 11/16/23  0307 11/16/23  0418 11/17/23  0247   WBC 12.68 12.45  --  9.50   HGB 8.3* 8.7*  --  8.1*   HCT 25.0* 25.6* 25* 24.1*   * 140*  --  149*   MCV 92 91  --  91    RDW 15.1* 14.6*  --  14.0        Chemistries:  Recent Labs   Lab 11/15/23  1107 11/16/23  0307 11/16/23  1541 11/17/23  0247   * 135* 137 136   K 4.3 4.4 3.4* 3.4*    102 106 97   CO2 19* 21* 21* 27   BUN 39* 45* 42* 39*   CREATININE 1.6* 1.3 1.1 1.2   CALCIUM 8.2* 8.0* 7.1* 8.1*   ALBUMIN 3.5 3.3*  --  3.2*   PROT 5.5* 5.5*  --  5.6*   BILITOT 0.5 0.4  --  0.6   ALKPHOS 46* 59  --  69   ALT 41 44  --  119*   * 105*  --  177*   MG  --  2.4 2.1 2.3   PHOS  --  5.0* 3.4 3.0       All pertinent labs within the past 24 hours have been reviewed.    Significant Imaging:  I have reviewed all pertinent imaging results/findings within the past 24 hours.  Assessment/Plan:     Pulmonary  Asthma  Hx asthma, will restart home inhalers  - Her home inhaler will be substituted for Breo while inpatient  - PRN Duonebs    Cardiac/Vascular  * S/P aortic valve replacement  Neuro/Psych:   - Sedation: none  - Pain: Currently endorsing chronic back pain. Her psychiatric history and prior drug use complicates pain control. 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 (She won't specify chronicity)  - Scheduled acetaminophen   - She fills flexeril regularly, we will use methocarbamol inpatient as it may be slightly less sedating for her      - Continue PRN oxycodone 5mg and 10mg PRN for acute surgical pain.   - Psych: awake, alert.   - She has a history of bipolar, anxiety, depression. She is prescribed risperidone, but she reports she does not take this medication.   - Continue risperidone to 2mg bid            Cardiac:   - s/p Ross procedure for bicuspid aortic valve and PFO closure with Dr. Montgomery  - Postoperative MONCHO: mild right ventricular dysfunction with normal LV function  - BP Goal: MAP>60 mmHg, SBP<120 mmHg  - Inotropes/Pressors: Milrinone 0.125 stopped this morning.   - Anti-HTNs: Clevidipine PRN  - Rhythm: junctional rhythm, rate 70s  - Beta Blocker: Milrinone stopped this  morning, will plan to start later today.  - Statin: Start per CTS  -    - Had multiple episodes of atrial flutter vs. junctional rhythm 10/14 with HR in 140s. Started on amiodarone infusion. Will transition to PO today. Continue PO 400mg amiodarone bid for 7 days.      Pulmonary:   - Nasal cannula  - Goal SpO2 >92%  - Wean supplemental oxygen as tolerated  - PRN albuterol for wheezing or SOB  - Patient is prescribed Advair, however she reports she does not use this  - We will substitute Breo while she is inpatient  - Continue IS  - ABGS PRN      Renal  - Trend BUN/Cr   - Maintain Crooks, record strict Is/Os  - Monitor UOP 2970cc/24H, Net -2116cc/24H  - Started furosemide infusion, max dose 30mg/hr- transition to PO lasix today 40mg bid  - Titrate lasix gtt to UOP goal   - She has an ASTON today improving 39/1.2  - We will continue to monitor closely     Recent Labs   Lab 11/16/23  0307 11/16/23  1541 11/17/23  0247   BUN 45* 42* 39*   CREATININE 1.3 1.1 1.2          FEN / GI:   - Daily CMP, PRN K/Mag/Phos per protocol   - Replace electrolytes as needed  - Nutrition: Cardiac diet, fluid restriction  - Bowel Regimen: Miralax, docusate  - Transaminitis- likely 2/2 surgery/CPB will continue to monitor     ID:   - Tmax: 101.5 early 11/14 morning, fever curve downtrend, no longer febrile  - She was febrile without a leukocytosis. Likely inflammatory in setting of recent surgical stress. Will continue to monitor.   - Abx: Complete perioperative cefazolin 2g Q8H x 5 doses  - She has a history of HIV. She reports compliance with her antivirals. Her family brought her home medication, we will continue.     Recent Labs   Lab 11/15/23  1107 11/16/23  0307 11/17/23  0247   WBC 12.68 12.45 9.50          Heme/Onc:   - Hgb 14.3 pre-operatively, slight downtrend but stable overall postoperatively  - Received 2 units of PRBCs, 950cc Cell Saver intraoperatively  - CBC daily  - ASA 325mg daily, will start pending chest tube  output    Recent Labs   Lab 11/15/23  0211 11/15/23  1107 11/16/23  0307 11/17/23  0247   HGB 8.6* 8.3* 8.7* 8.1*   * 123* 140* 149*   APTT 38.7*  --  29.6 27.1   INR 1.3*  --  1.1 1.1          Endocrine:   - CTS Goal -140  - HgbA1c:   - Endocrinology consulted for insulin management     PPx:   Feeding: Cardiac diet, fluid restriction  Analgesia/Sedation: controlled, continue PRN oxycodone  Thromboembolic Prevention: Heparin 5000 tid  HOB >30: Yes  Stress Ulcer: Not indicated, famotidine PO per CTS  Glucose Control: Yes, insulin management per Endocrinology     Lines/Drains/Airway:   Central Line - remove prior to floor transfer   Crooks     Dispo/Code Status/Palliative:   - Stable for stepdown from the ICU  - Full Code    Nonrheumatic aortic valve stenosis  S/p replacement. See plan for primary problem.     Oncology  Acute blood loss anemia  Hgb prior to surgery 14.3, arrived with hgb 10.3. Expected blood loss in post-operative setting. Continued slight downtrend, no s/s of continued bleeding. Will continue to monitor.     -- daily CBC  -- transfuse hgb < 7, symptomatic anemia      Endocrine  Transient hyperglycemia post procedure  - Blood glucose at goal.   - Appreciate endocrinology assistance with management         Critical care was time spent personally by me on the following activities: development of treatment plan with patient or surrogate and bedside caregivers, discussions with consultants, evaluation of patient's response to treatment, examination of patient, ordering and performing treatments and interventions, ordering and review of laboratory studies, ordering and review of radiographic studies, pulse oximetry, re-evaluation of patient's condition.  This critical care time did not overlap with that of any other provider or involve time for any procedures.     Elver Jones, DO  Critical Care - Surgery  [facility]

## 2023-11-17 NOTE — ASSESSMENT & PLAN NOTE
Neuro/Psych:   - Sedation: none  - Pain: Currently endorsing chronic back pain. Her psychiatric history and prior drug use complicates pain control. 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 (She won't specify chronicity)  - Scheduled acetaminophen   - She fills flexeril regularly, we will use methocarbamol inpatient as it may be slightly less sedating for her      - Continue PRN oxycodone 5mg and 10mg PRN for acute surgical pain.   - Psych: awake, alert.   - She has a history of bipolar, anxiety, depression. She is prescribed risperidone, but she reports she does not take this medication.   - Continue risperidone to 2mg bid            Cardiac:   - s/p Ross procedure for bicuspid aortic valve and PFO closure with Dr. Montgomery  - Postoperative MONCHO: mild right ventricular dysfunction with normal LV function  - BP Goal: MAP>60 mmHg, SBP<120 mmHg  - Inotropes/Pressors: Milrinone 0.125 stopped this morning.   - Anti-HTNs: Clevidipine PRN  - Rhythm: junctional rhythm, rate 70s  - Beta Blocker: Milrinone stopped this morning, will plan to start later today.  - Statin: Start per CTS  -    - Had multiple episodes of atrial flutter vs. junctional rhythm 10/14 with HR in 140s. Started on amiodarone infusion. Will transition to PO today. Continue PO 400mg amiodarone bid for 7 days.      Pulmonary:   - Nasal cannula  - Goal SpO2 >92%  - Wean supplemental oxygen as tolerated  - PRN albuterol for wheezing or SOB  - Patient is prescribed Advair, however she reports she does not use this  - We will substitute Breo while she is inpatient  - Continue IS  - ABGS PRN      Renal  - Trend BUN/Cr   - Maintain Crooks, record strict Is/Os  - Monitor UOP 2970cc/24H, Net -2116cc/24H  - Started furosemide infusion, max dose 30mg/hr- transition to PO lasix today 40mg bid  - Titrate lasix gtt to UOP goal   - She has an ASTON today improving 39/1.2  - We will continue to monitor closely     Recent Labs    Lab 11/16/23  0307 11/16/23  1541 11/17/23  0247   BUN 45* 42* 39*   CREATININE 1.3 1.1 1.2          FEN / GI:   - Daily CMP, PRN K/Mag/Phos per protocol   - Replace electrolytes as needed  - Nutrition: Cardiac diet, fluid restriction  - Bowel Regimen: Miralax, docusate  - Transaminitis- likely 2/2 surgery/CPB will continue to monitor     ID:   - Tmax: 101.5 early 11/14 morning, fever curve downtrend, no longer febrile  - She was febrile without a leukocytosis. Likely inflammatory in setting of recent surgical stress. Will continue to monitor.   - Abx: Complete perioperative cefazolin 2g Q8H x 5 doses  - She has a history of HIV. She reports compliance with her antivirals. Her family brought her home medication, we will continue.     Recent Labs   Lab 11/15/23  1107 11/16/23  0307 11/17/23  0247   WBC 12.68 12.45 9.50          Heme/Onc:   - Hgb 14.3 pre-operatively, slight downtrend but stable overall postoperatively  - Received 2 units of PRBCs, 950cc Cell Saver intraoperatively  - CBC daily  - ASA 325mg daily, will start pending chest tube output    Recent Labs   Lab 11/15/23  0211 11/15/23  1107 11/16/23  0307 11/17/23  0247   HGB 8.6* 8.3* 8.7* 8.1*   * 123* 140* 149*   APTT 38.7*  --  29.6 27.1   INR 1.3*  --  1.1 1.1          Endocrine:   - CTS Goal -140  - HgbA1c:   - Endocrinology consulted for insulin management     PPx:   Feeding: Cardiac diet, fluid restriction  Analgesia/Sedation: controlled, continue PRN oxycodone  Thromboembolic Prevention: Heparin 5000 tid  HOB >30: Yes  Stress Ulcer: Not indicated, famotidine PO per CTS  Glucose Control: Yes, insulin management per Endocrinology     Lines/Drains/Airway:   Central Line - remove prior to floor transfer   Valeriy     Dispo/Code Status/Palliative:   - Stable for stepdown from the ICU  - Full Code

## 2023-11-17 NOTE — PLAN OF CARE
"      SICU PLAN OF CARE NOTE    Dx: S/P aortic valve replacement    Shift Events: Lasix gtt titrated to UOP goal, O2 weaned as tolerated, Ruth Ann D/C    Goals of Care: MAP >60, SBP <120    Neuro: AAO x4, Follows Commands, and Moves All Extremities    Vital Signs: /72 (BP Location: Left arm, Patient Position: Lying)   Pulse 76   Temp 97.5 °F (36.4 °C) (Oral)   Resp 13   Ht 4' 11" (1.499 m)   Wt 71.8 kg (158 lb 4.6 oz)   LMP 05/01/2018 (Approximate) Comment: Irregular periods  SpO2 96%   Breastfeeding No   BMI 31.97 kg/m²     Cardiac:NSR    Respiratory: Nasal [id number]    Diet: Cardiac Diet 1500 ccFR    Gtts: Lasix and Milrinone    Urine Output: Urinary Catheter 1545 cc/shift     Labs/Accuchecks: Daily labs/accuchecks ACHS.    Skin: Bed plugged in with pads and foams in place. Pt able to weight shift and assisted with turns as needed. Medsternal incision noted during assessment. See flowsheet for skin and line details.         "

## 2023-11-17 NOTE — PROGRESS NOTES
"[facility]  Endocrinology  Progress Note    Admit Date: 2023     Reason for Consult: Management of Hyperglycemia     Surgical Procedure and Date:  23  REPLACEMENT, AORTIC VALVE, USING ROSS PROCEDURE (N/A)     Lab Results   Component Value Date    HGBA1C 5.2 2022       HPI:   Patient is a 48 y.o. 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 She has GARCIA due to her severe AS. Patient now presents for the above procedure(s). Endocrinology consulted for management of hyperglycemia.       Interval HPI:   No acute events overnight. Patient in room 20086/15997 A. Blood glucose stable. BG at goal on current insulin regimen (SSI ). Steroid use- None .   4 Days Post-Op  Renal function- Normal   Vasopressors-  None      Diet Cardiac Fluid - 1500mL      Eatin%  Nausea: No  Hypoglycemia and intervention: No  Fever: No  TPN and/or TF: No    BP (!) 100/57 (BP Location: Left arm, Patient Position: Lying)   Pulse 69   Temp 98.1 °F (36.7 °C) (Oral)   Resp 18   Ht 4' 11" (1.499 m)   Wt 74.4 kg (164 lb 0.4 oz)   LMP 2018 (Approximate) Comment: Irregular periods  SpO2 97%   Breastfeeding No   BMI 33.13 kg/m²     Labs Reviewed and Include    Recent Labs   Lab 23  0247   *   CALCIUM 8.1*   ALBUMIN 3.2*   PROT 5.6*      K 3.4*   CO2 27   CL 97   BUN 39*   CREATININE 1.2   ALKPHOS 69   *   *   BILITOT 0.6     Lab Results   Component Value Date    WBC 9.50 2023    HGB 8.1 (L) 2023    HCT 24.1 (L) 2023    MCV 91 2023     (L) 2023     No results for input(s): "TSH", "FREET4" in the last 168 hours.  Lab Results   Component Value Date    HGBA1C 5.2 2022       Nutritional status:   Body mass index is 33.13 kg/m².  Lab Results   Component Value Date    " "ALBUMIN 3.2 (L) 11/17/2023    ALBUMIN 3.3 (L) 11/16/2023    ALBUMIN 3.5 11/15/2023     No results found for: "PREALBUMIN"    Estimated Creatinine Clearance: 52.8 mL/min (based on SCr of 1.2 mg/dL).    Accu-Checks  Recent Labs     11/14/23  0900 11/14/23  1035 11/14/23  1748 11/14/23  2224 11/15/23  0209 11/15/23  2133 11/16/23  0838 11/16/23  1237 11/16/23  1755 11/16/23 2053   POCTGLUCOSE 104 126* 131* 144* 158* 144* 132* 105 151* 143*       Current Medications and/or Treatments Impacting Glycemic Control  Immunotherapy:    Immunosuppressants       None          Steroids:   Hormones (From admission, onward)      None          Pressors:    Autonomic Drugs (From admission, onward)      Start     Stop Route Frequency Ordered    11/17/23 0900  metoprolol succinate (TOPROL-XL) 24 hr split tablet 12.5 mg         -- Oral Daily 11/17/23 0534          Hyperglycemia/Diabetes Medications:   Antihyperglycemics (From admission, onward)      Start     Stop Route Frequency Ordered    11/14/23 1010  insulin aspart U-100 pen 0-10 Units         -- SubQ As needed (PRN) 11/14/23 0911            ASSESSMENT and PLAN    Cardiac/Vascular  * S/P aortic valve replacement  Managed per primary team  Optimize BG control        Nonrheumatic aortic valve stenosis  Managed per primary team  Optimize bg control        Endocrine  Transient hyperglycemia post procedure  BG goal 110-140 (CTS protocol)   No hx of DM. BG stable post surgery.      -Given bg stability,Endocrine will sign off.  Please re-consult with any new concerns              Leandro Nicolas PA-C  Endocrinology  [facility]  "

## 2023-11-18 LAB
ALBUMIN SERPL BCP-MCNC: 3 G/DL (ref 3.5–5.2)
ALP SERPL-CCNC: 72 U/L (ref 55–135)
ALT SERPL W/O P-5'-P-CCNC: 129 U/L (ref 10–44)
ANION GAP SERPL CALC-SCNC: 10 MMOL/L (ref 8–16)
APTT PPP: 31.2 SEC (ref 21–32)
AST SERPL-CCNC: 135 U/L (ref 10–40)
BILIRUB SERPL-MCNC: 0.7 MG/DL (ref 0.1–1)
BUN SERPL-MCNC: 36 MG/DL (ref 6–20)
CALCIUM SERPL-MCNC: 8.2 MG/DL (ref 8.7–10.5)
CHLORIDE SERPL-SCNC: 97 MMOL/L (ref 95–110)
CO2 SERPL-SCNC: 27 MMOL/L (ref 23–29)
CREAT SERPL-MCNC: 0.9 MG/DL (ref 0.5–1.4)
ERYTHROCYTE [DISTWIDTH] IN BLOOD BY AUTOMATED COUNT: 14 % (ref 11.5–14.5)
EST. GFR  (NO RACE VARIABLE): >60 ML/MIN/1.73 M^2
GLUCOSE SERPL-MCNC: 112 MG/DL (ref 70–110)
HCT VFR BLD AUTO: 24.2 % (ref 37–48.5)
HGB BLD-MCNC: 8 G/DL (ref 12–16)
INR PPP: 1.1 (ref 0.8–1.2)
MCH RBC QN AUTO: 30.5 PG (ref 27–31)
MCHC RBC AUTO-ENTMCNC: 33.1 G/DL (ref 32–36)
MCV RBC AUTO: 92 FL (ref 82–98)
PLATELET # BLD AUTO: 175 K/UL (ref 150–450)
PMV BLD AUTO: 11.6 FL (ref 9.2–12.9)
POTASSIUM SERPL-SCNC: 4.1 MMOL/L (ref 3.5–5.1)
PROT SERPL-MCNC: 5.3 G/DL (ref 6–8.4)
PROTHROMBIN TIME: 12.1 SEC (ref 9–12.5)
RBC # BLD AUTO: 2.62 M/UL (ref 4–5.4)
SODIUM SERPL-SCNC: 134 MMOL/L (ref 136–145)
WBC # BLD AUTO: 8.02 K/UL (ref 3.9–12.7)

## 2023-11-18 PROCEDURE — 94640 AIRWAY INHALATION TREATMENT: CPT

## 2023-11-18 PROCEDURE — 97110 THERAPEUTIC EXERCISES: CPT | Mod: CQ

## 2023-11-18 PROCEDURE — 25000003 PHARM REV CODE 250

## 2023-11-18 PROCEDURE — 85027 COMPLETE CBC AUTOMATED: CPT | Performed by: STUDENT IN AN ORGANIZED HEALTH CARE EDUCATION/TRAINING PROGRAM

## 2023-11-18 PROCEDURE — 27000221 HC OXYGEN, UP TO 24 HOURS

## 2023-11-18 PROCEDURE — 85610 PROTHROMBIN TIME: CPT | Performed by: STUDENT IN AN ORGANIZED HEALTH CARE EDUCATION/TRAINING PROGRAM

## 2023-11-18 PROCEDURE — 99900035 HC TECH TIME PER 15 MIN (STAT)

## 2023-11-18 PROCEDURE — 25000003 PHARM REV CODE 250: Performed by: STUDENT IN AN ORGANIZED HEALTH CARE EDUCATION/TRAINING PROGRAM

## 2023-11-18 PROCEDURE — 97116 GAIT TRAINING THERAPY: CPT | Mod: CQ

## 2023-11-18 PROCEDURE — 20600001 HC STEP DOWN PRIVATE ROOM

## 2023-11-18 PROCEDURE — 80053 COMPREHEN METABOLIC PANEL: CPT | Performed by: STUDENT IN AN ORGANIZED HEALTH CARE EDUCATION/TRAINING PROGRAM

## 2023-11-18 PROCEDURE — 25000003 PHARM REV CODE 250: Performed by: THORACIC SURGERY (CARDIOTHORACIC VASCULAR SURGERY)

## 2023-11-18 PROCEDURE — 36415 COLL VENOUS BLD VENIPUNCTURE: CPT | Performed by: STUDENT IN AN ORGANIZED HEALTH CARE EDUCATION/TRAINING PROGRAM

## 2023-11-18 PROCEDURE — 94761 N-INVAS EAR/PLS OXIMETRY MLT: CPT

## 2023-11-18 PROCEDURE — 63600175 PHARM REV CODE 636 W HCPCS: Performed by: STUDENT IN AN ORGANIZED HEALTH CARE EDUCATION/TRAINING PROGRAM

## 2023-11-18 PROCEDURE — 85730 THROMBOPLASTIN TIME PARTIAL: CPT | Performed by: STUDENT IN AN ORGANIZED HEALTH CARE EDUCATION/TRAINING PROGRAM

## 2023-11-18 RX ORDER — METOPROLOL SUCCINATE 25 MG/1
25 TABLET, EXTENDED RELEASE ORAL DAILY
Status: DISCONTINUED | OUTPATIENT
Start: 2023-11-18 | End: 2023-11-19 | Stop reason: HOSPADM

## 2023-11-18 RX ORDER — SIMETHICONE 80 MG
1 TABLET,CHEWABLE ORAL ONCE
Status: COMPLETED | OUTPATIENT
Start: 2023-11-18 | End: 2023-11-18

## 2023-11-18 RX ORDER — FUROSEMIDE 80 MG/1
80 TABLET ORAL 2 TIMES DAILY
Status: DISCONTINUED | OUTPATIENT
Start: 2023-11-18 | End: 2023-11-19 | Stop reason: HOSPADM

## 2023-11-18 RX ADMIN — MONTELUKAST 10 MG: 10 TABLET, FILM COATED ORAL at 09:11

## 2023-11-18 RX ADMIN — ACETAMINOPHEN 650 MG: 325 TABLET ORAL at 09:11

## 2023-11-18 RX ADMIN — ACETAMINOPHEN 650 MG: 325 TABLET ORAL at 02:11

## 2023-11-18 RX ADMIN — HEPARIN SODIUM 5000 UNITS: 5000 INJECTION INTRAVENOUS; SUBCUTANEOUS at 02:11

## 2023-11-18 RX ADMIN — OXYCODONE HYDROCHLORIDE 5 MG: 5 TABLET ORAL at 05:11

## 2023-11-18 RX ADMIN — HEPARIN SODIUM 5000 UNITS: 5000 INJECTION INTRAVENOUS; SUBCUTANEOUS at 05:11

## 2023-11-18 RX ADMIN — OXYCODONE HYDROCHLORIDE 10 MG: 10 TABLET ORAL at 03:11

## 2023-11-18 RX ADMIN — ACETAMINOPHEN 650 MG: 325 TABLET ORAL at 05:11

## 2023-11-18 RX ADMIN — AMIODARONE HYDROCHLORIDE 400 MG: 200 TABLET ORAL at 08:11

## 2023-11-18 RX ADMIN — ASPIRIN 325 MG ORAL TABLET 325 MG: 325 PILL ORAL at 09:11

## 2023-11-18 RX ADMIN — METHOCARBAMOL 500 MG: 500 TABLET ORAL at 03:11

## 2023-11-18 RX ADMIN — FUROSEMIDE 80 MG: 80 TABLET ORAL at 05:11

## 2023-11-18 RX ADMIN — DULOXETINE HYDROCHLORIDE 60 MG: 60 CAPSULE, DELAYED RELEASE ORAL at 09:11

## 2023-11-18 RX ADMIN — POTASSIUM CHLORIDE 20 MEQ: 1500 TABLET, EXTENDED RELEASE ORAL at 09:11

## 2023-11-18 RX ADMIN — FLUTICASONE FUROATE AND VILANTEROL TRIFENATATE 1 PUFF: 200; 25 POWDER RESPIRATORY (INHALATION) at 07:11

## 2023-11-18 RX ADMIN — LIDOCAINE 1 PATCH: 50 PATCH CUTANEOUS at 09:11

## 2023-11-18 RX ADMIN — OXYCODONE HYDROCHLORIDE 5 MG: 5 TABLET ORAL at 08:11

## 2023-11-18 RX ADMIN — FUROSEMIDE 80 MG: 80 TABLET ORAL at 09:11

## 2023-11-18 RX ADMIN — FAMOTIDINE 20 MG: 20 TABLET ORAL at 09:11

## 2023-11-18 RX ADMIN — HEPARIN SODIUM 5000 UNITS: 5000 INJECTION INTRAVENOUS; SUBCUTANEOUS at 09:11

## 2023-11-18 RX ADMIN — POLYETHYLENE GLYCOL 3350 17 G: 17 POWDER, FOR SOLUTION ORAL at 09:11

## 2023-11-18 RX ADMIN — POTASSIUM CHLORIDE 20 MEQ: 1500 TABLET, EXTENDED RELEASE ORAL at 08:11

## 2023-11-18 RX ADMIN — METHOCARBAMOL 500 MG: 500 TABLET ORAL at 08:11

## 2023-11-18 RX ADMIN — RISPERIDONE 2 MG: 1 TABLET ORAL at 08:11

## 2023-11-18 RX ADMIN — ELVITEGRAVIR, COBICISTAT, EMTRICITABINE, AND TENOFOVIR ALAFENAMIDE 1 TABLET: 150; 150; 200; 10 TABLET ORAL at 09:11

## 2023-11-18 RX ADMIN — SENNOSIDES AND DOCUSATE SODIUM 1 TABLET: 8.6; 5 TABLET ORAL at 09:11

## 2023-11-18 RX ADMIN — AMIODARONE HYDROCHLORIDE 400 MG: 200 TABLET ORAL at 09:11

## 2023-11-18 RX ADMIN — METOPROLOL SUCCINATE 25 MG: 25 TABLET, EXTENDED RELEASE ORAL at 09:11

## 2023-11-18 RX ADMIN — RISPERIDONE 2 MG: 1 TABLET ORAL at 09:11

## 2023-11-18 RX ADMIN — SIMETHICONE 80 MG: 80 TABLET, CHEWABLE ORAL at 08:11

## 2023-11-18 RX ADMIN — METHOCARBAMOL 500 MG: 500 TABLET ORAL at 09:11

## 2023-11-18 NOTE — PLAN OF CARE
Problem: Adult Inpatient Plan of Care  Goal: Plan of Care Review  Outcome: Ongoing, Progressing     Problem: Fall Injury Risk  Goal: Absence of Fall and Fall-Related Injury  Outcome: Ongoing, Progressing     Problem: Skin Injury Risk Increased  Goal: Skin Health and Integrity  Outcome: Ongoing, Progressing   Pt A&Ox4. Ambulates with assistance. Unsteady on feet without assistance. C/o of incisional pain, PRN pain medication and scheduled tylenol administered. Mom at bedside.

## 2023-11-18 NOTE — PT/OT/SLP PROGRESS
Physical Therapy Treatment    Patient Name:  Kendra Will   MRN:  [id number]    Recommendations:     Discharge Recommendations: Moderate Intensity Therapy  Discharge Equipment Recommendations: bedside commode  Barriers to discharge: Increased level of skilled assistance required     Assessment:     Kendra Will is a 48 y.o. female admitted with a medical diagnosis of S/P aortic valve replacement.  She presents with the following impairments/functional limitations: weakness, impaired endurance, impaired self care skills, gait instability, impaired functional mobility, impaired balance, decreased coordination, pain, decreased safety awareness, impaired cardiopulmonary response to activity .Pt Progressing with PT Intervention. Pt Progressing with improving gait distance however limited due to fatigue. Pt would continue to benefit from skilled PT to address overall functional mobility, goals , and to return to functional baseline.  Goals remain appropriate.    Rehab Prognosis: Good; patient would benefit from acute skilled PT services to address these deficits and reach maximum level of function.    Recent Surgery: Procedure(s) (LRB):  REPLACEMENT, AORTIC VALVE, USING ROSS PROCEDURE (N/A) 5 Days Post-Op    Plan:     During this hospitalization, patient to be seen 5 x/week to address the identified rehab impairments via gait training, therapeutic activities, therapeutic exercises, neuromuscular re-education and progress toward the following goals:    Plan of Care Expires:  12/15/23    Subjective     Patient/Family Comments/goals: I will try to walk  Pain/Comfort:  Pain Rating 1: 6/10  Location - Side 1: Bilateral  Location - Orientation 1: generalized  Location 1: sternal  Pain Addressed 1: Reposition, Distraction      Objective:     Communicated with RN prior to session.  Patient found up in chair with oxygen, telemetry upon PT entry to room.     General Precautions: Standard, fall, sternal  Orthopedic Precautions:  N/A  Braces: N/A  Respiratory Status: Nasal cannula, flow 1 L/min     Functional Mobility:  Transfers:     Sit to Stand:  contact guard assistance with no AD  Gait: pt amb with no AD x 70 ft with CGA/Fara for balance with O2 in tow. Pt with 2 episode of lateral LOB with Fara to correct      AM-PAC 6 CLICK MOBILITY  Turning over in bed (including adjusting bedclothes, sheets and blankets)?: 3  Sitting down on and standing up from a chair with arms (e.g., wheelchair, bedside commode, etc.): 3  Moving from lying on back to sitting on the side of the bed?: 3  Moving to and from a bed to a chair (including a wheelchair)?: 3  Need to walk in hospital room?: 3  Climbing 3-5 steps with a railing?: 2  Basic Mobility Total Score: 17       Treatment & Education:  Therapist provided instruction and educated of  patient on progress, safety,d/c,PT POC,   proper body mechanics, energy conservation, and fall prevention strategies during tasks listed above, on the effects of prolonged immobility and the importance of performing OOB activity and exercises to promote healing and reduce recovery time      Patient  facilitated therex seated in bedside chair B LE AROM AP, LAQ, Hip Flexion, Hip Abd/Add. Patient required skilled PTA for instruction of exercises and appropriate cues to perform exercises safely, sequencing and appropriately.   Exercises performed to develop and maintain pt's strength, endurance and flexibility.  Updated white board with appropriate PT mobility information for medical team notification    Call nursing/pct to transfer to chair/use bathroom. Pt stated understanding    Bedside table in front of patient and area set up for function, convenience, and safety. RN aware of patient's mobility needs and status. Questions/concerns addressed within PTA scope of practice; patient  with no further questions. Time was provided for active listening, discussion of health disposition, and discussion of safe discharge.  Pt?verbalized?agreement .    Patient left up in chair with all lines intact, call button in reach, and nsg notified..    GOALS:   Multidisciplinary Problems       Physical Therapy Goals          Problem: Physical Therapy    Goal Priority Disciplines Outcome Goal Variances Interventions   Physical Therapy Goal     PT, PT/OT Ongoing, Progressing     Description: Goals to be met by: 12/15/23     Patient will increase functional independence with mobility by performin. Supine to sit with Contact Guard Assistance  2. Sit to supine with Contact Guard Assistance  3. Sit to stand transfer with Contact Guard Assistance  4. Bed to chair transfer with Contact Guard Assistance using No Assistive Device  5. Gait  x 200 feet with Contact Guard Assistance using No Assistive Device.                          Time Tracking:     PT Received On: 23  PT Start Time: 916     PT Stop Time: 939  PT Total Time (min): 23 min     Billable Minutes: Gait Training 13 and Therapeutic Exercise 10    Treatment Type: Treatment  PT/PTA: PTA     Number of PTA visits since last PT visit: 1     2023

## 2023-11-18 NOTE — SUBJECTIVE & OBJECTIVE
Interval History: No acute events  AFVSS on minimal supplemental O2    Review of Systems   Constitutional: Negative.   HENT: Negative.     Eyes: Negative.    Cardiovascular: Negative.    Respiratory: Negative.     Endocrine: Negative.    Hematologic/Lymphatic: Negative.    Skin: Negative.    Musculoskeletal: Negative.    Gastrointestinal: Negative.    Genitourinary: Negative.    Neurological: Negative.    Psychiatric/Behavioral: Negative.       Medications:  Continuous Infusions:  Scheduled Meds:   acetaminophen  650 mg Oral Q8H    amiodarone  400 mg Oral BID    aspirin  325 mg Oral Daily    DULoxetine  60 mg Oral Daily    elviteg-cob-emtri-tenof ALAFEN  1 tablet Oral Daily    famotidine  20 mg Oral Daily    fluticasone furoate-vilanteroL  1 puff Inhalation Daily    furosemide  80 mg Oral BID    heparin (porcine)  5,000 Units Subcutaneous Q8H    LIDOcaine  1 patch Transdermal Q24H    methocarbamoL  500 mg Oral TID    metoprolol succinate  25 mg Oral Daily    montelukast  10 mg Oral Daily    polyethylene glycol  17 g Oral BID    potassium chloride  20 mEq Oral BID    risperiDONE  2 mg Oral BID    senna-docusate 8.6-50 mg  1 tablet Oral BID     PRN Meds:sodium chloride 0.9%, albuterol-ipratropium, calcium gluconate IVPB, calcium gluconate IVPB, calcium gluconate IVPB, dextromethorphan-guaiFENesin  mg, dextrose 10%, dextrose 10%, haloperidol lactate, magnesium sulfate IVPB, magnesium sulfate IVPB, ondansetron, oxyCODONE, oxyCODONE, potassium bicarbonate, potassium bicarbonate, potassium bicarbonate, potassium, sodium phosphates, potassium, sodium phosphates, potassium, sodium phosphates, prochlorperazine, sodium chloride 0.9%     Objective:     Vital Signs (Most Recent):  Temp: 97.9 °F (36.6 °C) (11/18/23 1119)  Pulse: 69 (11/18/23 1121)  Resp: 20 (11/18/23 1119)  BP: (!) 90/51 (11/18/23 1119)  SpO2: 97 % (11/18/23 1119) Vital Signs (24h Range):  Temp:  [97.5 °F (36.4 °C)-98.7 °F (37.1 °C)] 97.9 °F (36.6  °C)  Pulse:  [64-75] 69  Resp:  [16-20] 20  SpO2:  [95 %-100 %] 97 %  BP: ()/(51-66) 90/51     Weight: 68.8 kg (151 lb 10.8 oz)  Body mass index is 30.63 kg/m².    SpO2: 97 %       Intake/Output - Last 3 Shifts         11/16 0700  11/17 0659 11/17 0700 11/18 0659 11/18 0700 11/19 0659    P.O. 600 240 476    I.V. (mL/kg) 280.8 (3.8) 1.6 (0)     Total Intake(mL/kg) 880.8 (11.8) 241.6 (3.2) 476 (6.9)    Urine (mL/kg/hr) 2972 (1.7) 130 (0.1)     Stool  0     Chest Tube 0      Total Output 2972 130     Net -2091.2 +111.6 +476           Urine Occurrence  2 x 1 x    Stool Occurrence  2 x             Lines/Drains/Airways       Peripheral Intravenous Line  Duration                  Peripheral IV - Single Lumen 11/17/23 0817 20 G Distal;Posterior;Right Forearm 1 day                     Physical Exam  Vitals and nursing note reviewed.   Constitutional:       Appearance: Normal appearance.   HENT:      Head: Normocephalic.      Mouth/Throat:      Mouth: Mucous membranes are moist.   Eyes:      Extraocular Movements: Extraocular movements intact.      Pupils: Pupils are equal, round, and reactive to light.   Cardiovascular:      Rate and Rhythm: Normal rate and regular rhythm.   Pulmonary:      Effort: Pulmonary effort is normal.   Abdominal:      General: Abdomen is flat.      Palpations: Abdomen is soft.   Musculoskeletal:         General: Normal range of motion.      Cervical back: Neck supple.   Skin:     General: Skin is warm.      Comments: Sternal incision c/d/i   Neurological:      General: No focal deficit present.      Mental Status: She is alert and oriented to person, place, and time.   Psychiatric:         Mood and Affect: Mood normal.         Behavior: Behavior normal.         Significant Labs:  All pertinent labs from the last 24 hours have been reviewed.    Significant Diagnostics:  I have reviewed all pertinent imaging results/findings within the past 24 hours.

## 2023-11-18 NOTE — PROGRESS NOTES
[facility] - Cardiology Stepdown  Cardiothoracic Surgery  Progress Note    Patient Name: Kendra Will  MRN: [id number]  Admission Date: 11/13/2023  Hospital Length of Stay: 5 days  Code Status: Full Code   Attending Physician: Nicola Montgomery MD   Referring Provider: Nicola Montgomery MD  Principal Problem:S/P aortic valve replacement      Subjective:     Post-Op Info:  Procedure(s) (LRB):  REPLACEMENT, AORTIC VALVE, USING ROSS PROCEDURE (N/A)   5 Days Post-Op     Interval History: No acute events  AFVSS on minimal supplemental O2    Review of Systems   Constitutional: Negative.   HENT: Negative.     Eyes: Negative.    Cardiovascular: Negative.    Respiratory: Negative.     Endocrine: Negative.    Hematologic/Lymphatic: Negative.    Skin: Negative.    Musculoskeletal: Negative.    Gastrointestinal: Negative.    Genitourinary: Negative.    Neurological: Negative.    Psychiatric/Behavioral: Negative.       Medications:  Continuous Infusions:  Scheduled Meds:   acetaminophen  650 mg Oral Q8H    amiodarone  400 mg Oral BID    aspirin  325 mg Oral Daily    DULoxetine  60 mg Oral Daily    elviteg-cob-emtri-tenof ALAFEN  1 tablet Oral Daily    famotidine  20 mg Oral Daily    fluticasone furoate-vilanteroL  1 puff Inhalation Daily    furosemide  80 mg Oral BID    heparin (porcine)  5,000 Units Subcutaneous Q8H    LIDOcaine  1 patch Transdermal Q24H    methocarbamoL  500 mg Oral TID    metoprolol succinate  25 mg Oral Daily    montelukast  10 mg Oral Daily    polyethylene glycol  17 g Oral BID    potassium chloride  20 mEq Oral BID    risperiDONE  2 mg Oral BID    senna-docusate 8.6-50 mg  1 tablet Oral BID     PRN Meds:sodium chloride 0.9%, albuterol-ipratropium, calcium gluconate IVPB, calcium gluconate IVPB, calcium gluconate IVPB, dextromethorphan-guaiFENesin  mg, dextrose 10%, dextrose 10%, haloperidol lactate, magnesium sulfate IVPB, magnesium sulfate IVPB, ondansetron, oxyCODONE, oxyCODONE, potassium  bicarbonate, potassium bicarbonate, potassium bicarbonate, potassium, sodium phosphates, potassium, sodium phosphates, potassium, sodium phosphates, prochlorperazine, sodium chloride 0.9%     Objective:     Vital Signs (Most Recent):  Temp: 97.9 °F (36.6 °C) (11/18/23 1119)  Pulse: 69 (11/18/23 1121)  Resp: 20 (11/18/23 1119)  BP: (!) 90/51 (11/18/23 1119)  SpO2: 97 % (11/18/23 1119) Vital Signs (24h Range):  Temp:  [97.5 °F (36.4 °C)-98.7 °F (37.1 °C)] 97.9 °F (36.6 °C)  Pulse:  [64-75] 69  Resp:  [16-20] 20  SpO2:  [95 %-100 %] 97 %  BP: ()/(51-66) 90/51     Weight: 68.8 kg (151 lb 10.8 oz)  Body mass index is 30.63 kg/m².    SpO2: 97 %       Intake/Output - Last 3 Shifts         11/16 0700  11/17 0659 11/17 0700  11/18 0659 11/18 0700  11/19 0659    P.O. 600 240 476    I.V. (mL/kg) 280.8 (3.8) 1.6 (0)     Total Intake(mL/kg) 880.8 (11.8) 241.6 (3.2) 476 (6.9)    Urine (mL/kg/hr) 2972 (1.7) 130 (0.1)     Stool  0     Chest Tube 0      Total Output 2972 130     Net -2091.2 +111.6 +476           Urine Occurrence  2 x 1 x    Stool Occurrence  2 x             Lines/Drains/Airways       Peripheral Intravenous Line  Duration                  Peripheral IV - Single Lumen 11/17/23 0817 20 G Distal;Posterior;Right Forearm 1 day                     Physical Exam  Vitals and nursing note reviewed.   Constitutional:       Appearance: Normal appearance.   HENT:      Head: Normocephalic.      Mouth/Throat:      Mouth: Mucous membranes are moist.   Eyes:      Extraocular Movements: Extraocular movements intact.      Pupils: Pupils are equal, round, and reactive to light.   Cardiovascular:      Rate and Rhythm: Normal rate and regular rhythm.   Pulmonary:      Effort: Pulmonary effort is normal.   Abdominal:      General: Abdomen is flat.      Palpations: Abdomen is soft.   Musculoskeletal:         General: Normal range of motion.      Cervical back: Neck supple.   Skin:     General: Skin is warm.      Comments: Sternal  incision c/d/i   Neurological:      General: No focal deficit present.      Mental Status: She is alert and oriented to person, place, and time.   Psychiatric:         Mood and Affect: Mood normal.         Behavior: Behavior normal.         Significant Labs:  All pertinent labs from the last 24 hours have been reviewed.    Significant Diagnostics:  I have reviewed all pertinent imaging results/findings within the past 24 hours.  Assessment/Plan:     * S/P aortic valve replacement  48 y.o. female with h/o  bicuspid aortic valve with severe aortic stenosis, persistent left superior vena cava, prior stroke, asthma, bipolar disorder, and HIV now s/p Ross procedure on 11/13/23    Cardiac diet   Sternal precautions  ASA, Statin  Toprol XL 25mg  BID Lasix  Continue PO Amiodarone  Home psych meds  Home HIV meds  DVT ppx  Wean supplemental O2  OOBTC, Ambulate QID        Venkatesh Ibrahim MD  Cardiothoracic Surgery  [facility] - Cardiology Stepdown

## 2023-11-19 VITALS
WEIGHT: 151.69 LBS | DIASTOLIC BLOOD PRESSURE: 58 MMHG | HEART RATE: 68 BPM | TEMPERATURE: 97 F | OXYGEN SATURATION: 94 % | HEIGHT: 59 IN | SYSTOLIC BLOOD PRESSURE: 101 MMHG | BODY MASS INDEX: 30.58 KG/M2 | RESPIRATION RATE: 17 BRPM

## 2023-11-19 LAB
ALBUMIN SERPL BCP-MCNC: 3.1 G/DL (ref 3.5–5.2)
ALP SERPL-CCNC: 76 U/L (ref 55–135)
ALT SERPL W/O P-5'-P-CCNC: 103 U/L (ref 10–44)
ANION GAP SERPL CALC-SCNC: 9 MMOL/L (ref 8–16)
APTT PPP: 25.6 SEC (ref 21–32)
AST SERPL-CCNC: 73 U/L (ref 10–40)
BILIRUB SERPL-MCNC: 0.6 MG/DL (ref 0.1–1)
BUN SERPL-MCNC: 42 MG/DL (ref 6–20)
CALCIUM SERPL-MCNC: 8.8 MG/DL (ref 8.7–10.5)
CHLORIDE SERPL-SCNC: 98 MMOL/L (ref 95–110)
CO2 SERPL-SCNC: 29 MMOL/L (ref 23–29)
CREAT SERPL-MCNC: 1 MG/DL (ref 0.5–1.4)
ERYTHROCYTE [DISTWIDTH] IN BLOOD BY AUTOMATED COUNT: 14 % (ref 11.5–14.5)
EST. GFR  (NO RACE VARIABLE): >60 ML/MIN/1.73 M^2
GLUCOSE SERPL-MCNC: 121 MG/DL (ref 70–110)
HCT VFR BLD AUTO: 25.3 % (ref 37–48.5)
HGB BLD-MCNC: 8.2 G/DL (ref 12–16)
INR PPP: 1.1 (ref 0.8–1.2)
MCH RBC QN AUTO: 30.4 PG (ref 27–31)
MCHC RBC AUTO-ENTMCNC: 32.4 G/DL (ref 32–36)
MCV RBC AUTO: 94 FL (ref 82–98)
PLATELET # BLD AUTO: 219 K/UL (ref 150–450)
PMV BLD AUTO: 10.8 FL (ref 9.2–12.9)
POTASSIUM SERPL-SCNC: 4.9 MMOL/L (ref 3.5–5.1)
PROT SERPL-MCNC: 5.6 G/DL (ref 6–8.4)
PROTHROMBIN TIME: 11.8 SEC (ref 9–12.5)
RBC # BLD AUTO: 2.7 M/UL (ref 4–5.4)
SODIUM SERPL-SCNC: 136 MMOL/L (ref 136–145)
WBC # BLD AUTO: 7.28 K/UL (ref 3.9–12.7)

## 2023-11-19 PROCEDURE — 94640 AIRWAY INHALATION TREATMENT: CPT

## 2023-11-19 PROCEDURE — 85027 COMPLETE CBC AUTOMATED: CPT | Performed by: STUDENT IN AN ORGANIZED HEALTH CARE EDUCATION/TRAINING PROGRAM

## 2023-11-19 PROCEDURE — 25000003 PHARM REV CODE 250

## 2023-11-19 PROCEDURE — 25000003 PHARM REV CODE 250: Performed by: STUDENT IN AN ORGANIZED HEALTH CARE EDUCATION/TRAINING PROGRAM

## 2023-11-19 PROCEDURE — 94761 N-INVAS EAR/PLS OXIMETRY MLT: CPT

## 2023-11-19 PROCEDURE — 63600175 PHARM REV CODE 636 W HCPCS: Performed by: STUDENT IN AN ORGANIZED HEALTH CARE EDUCATION/TRAINING PROGRAM

## 2023-11-19 PROCEDURE — 36415 COLL VENOUS BLD VENIPUNCTURE: CPT | Performed by: STUDENT IN AN ORGANIZED HEALTH CARE EDUCATION/TRAINING PROGRAM

## 2023-11-19 PROCEDURE — 97535 SELF CARE MNGMENT TRAINING: CPT

## 2023-11-19 PROCEDURE — 25000003 PHARM REV CODE 250: Performed by: THORACIC SURGERY (CARDIOTHORACIC VASCULAR SURGERY)

## 2023-11-19 PROCEDURE — 80053 COMPREHEN METABOLIC PANEL: CPT | Performed by: STUDENT IN AN ORGANIZED HEALTH CARE EDUCATION/TRAINING PROGRAM

## 2023-11-19 PROCEDURE — 85610 PROTHROMBIN TIME: CPT | Performed by: STUDENT IN AN ORGANIZED HEALTH CARE EDUCATION/TRAINING PROGRAM

## 2023-11-19 PROCEDURE — 85730 THROMBOPLASTIN TIME PARTIAL: CPT | Performed by: STUDENT IN AN ORGANIZED HEALTH CARE EDUCATION/TRAINING PROGRAM

## 2023-11-19 RX ORDER — FUROSEMIDE 80 MG/1
80 TABLET ORAL DAILY
Qty: 5 TABLET | Refills: 0 | Status: SHIPPED | OUTPATIENT
Start: 2023-11-20 | End: 2024-01-03 | Stop reason: ALTCHOICE

## 2023-11-19 RX ORDER — DULOXETIN HYDROCHLORIDE 60 MG/1
60 CAPSULE, DELAYED RELEASE ORAL DAILY
Qty: 30 CAPSULE | Refills: 2 | Status: SHIPPED | OUTPATIENT
Start: 2023-11-20 | End: 2024-01-16

## 2023-11-19 RX ORDER — METOPROLOL SUCCINATE 25 MG/1
25 TABLET, EXTENDED RELEASE ORAL DAILY
Qty: 30 TABLET | Refills: 11 | Status: SHIPPED | OUTPATIENT
Start: 2023-11-20 | End: 2024-01-16 | Stop reason: SDUPTHER

## 2023-11-19 RX ORDER — POTASSIUM CHLORIDE 20 MEQ/1
20 TABLET, EXTENDED RELEASE ORAL DAILY
Qty: 5 TABLET | Refills: 0 | Status: SHIPPED | OUTPATIENT
Start: 2023-11-20 | End: 2023-11-25

## 2023-11-19 RX ORDER — OXYCODONE HYDROCHLORIDE 5 MG/1
5 TABLET ORAL EVERY 4 HOURS PRN
Qty: 30 TABLET | Refills: 0 | Status: SHIPPED | OUTPATIENT
Start: 2023-11-19 | End: 2024-01-03 | Stop reason: ALTCHOICE

## 2023-11-19 RX ORDER — AMIODARONE HYDROCHLORIDE 200 MG/1
200 TABLET ORAL DAILY
Qty: 30 TABLET | Refills: 0 | Status: SHIPPED | OUTPATIENT
Start: 2023-11-19 | End: 2024-01-03 | Stop reason: ALTCHOICE

## 2023-11-19 RX ORDER — POLYETHYLENE GLYCOL 3350 17 G/17G
17 POWDER, FOR SOLUTION ORAL 2 TIMES DAILY PRN
Refills: 0 | COMMUNITY
Start: 2023-11-19 | End: 2024-01-03 | Stop reason: ALTCHOICE

## 2023-11-19 RX ORDER — ASPIRIN 325 MG
325 TABLET ORAL DAILY
Refills: 0 | COMMUNITY
Start: 2023-11-20 | End: 2024-01-29

## 2023-11-19 RX ORDER — METHOCARBAMOL 500 MG/1
500 TABLET, FILM COATED ORAL 3 TIMES DAILY
Qty: 30 TABLET | Refills: 0 | Status: SHIPPED | OUTPATIENT
Start: 2023-11-19 | End: 2023-11-29

## 2023-11-19 RX ADMIN — RISPERIDONE 2 MG: 1 TABLET ORAL at 09:11

## 2023-11-19 RX ADMIN — LIDOCAINE 1 PATCH: 50 PATCH CUTANEOUS at 09:11

## 2023-11-19 RX ADMIN — POTASSIUM CHLORIDE 20 MEQ: 1500 TABLET, EXTENDED RELEASE ORAL at 09:11

## 2023-11-19 RX ADMIN — OXYCODONE HYDROCHLORIDE 5 MG: 5 TABLET ORAL at 05:11

## 2023-11-19 RX ADMIN — FAMOTIDINE 20 MG: 20 TABLET ORAL at 09:11

## 2023-11-19 RX ADMIN — FUROSEMIDE 80 MG: 80 TABLET ORAL at 09:11

## 2023-11-19 RX ADMIN — SENNOSIDES AND DOCUSATE SODIUM 1 TABLET: 8.6; 5 TABLET ORAL at 09:11

## 2023-11-19 RX ADMIN — METOPROLOL SUCCINATE 25 MG: 25 TABLET, EXTENDED RELEASE ORAL at 09:11

## 2023-11-19 RX ADMIN — AMIODARONE HYDROCHLORIDE 400 MG: 200 TABLET ORAL at 09:11

## 2023-11-19 RX ADMIN — ASPIRIN 325 MG ORAL TABLET 325 MG: 325 PILL ORAL at 09:11

## 2023-11-19 RX ADMIN — POLYETHYLENE GLYCOL 3350 17 G: 17 POWDER, FOR SOLUTION ORAL at 09:11

## 2023-11-19 RX ADMIN — ELVITEGRAVIR, COBICISTAT, EMTRICITABINE, AND TENOFOVIR ALAFENAMIDE 1 TABLET: 150; 150; 200; 10 TABLET ORAL at 09:11

## 2023-11-19 RX ADMIN — FLUTICASONE FUROATE AND VILANTEROL TRIFENATATE 1 PUFF: 200; 25 POWDER RESPIRATORY (INHALATION) at 07:11

## 2023-11-19 RX ADMIN — METHOCARBAMOL 500 MG: 500 TABLET ORAL at 09:11

## 2023-11-19 RX ADMIN — ACETAMINOPHEN 650 MG: 325 TABLET ORAL at 05:11

## 2023-11-19 RX ADMIN — HEPARIN SODIUM 5000 UNITS: 5000 INJECTION INTRAVENOUS; SUBCUTANEOUS at 05:11

## 2023-11-19 RX ADMIN — DULOXETINE HYDROCHLORIDE 60 MG: 60 CAPSULE, DELAYED RELEASE ORAL at 09:11

## 2023-11-19 RX ADMIN — MONTELUKAST 10 MG: 10 TABLET, FILM COATED ORAL at 09:11

## 2023-11-19 NOTE — PLAN OF CARE
Problem: Adult Inpatient Plan of Care  Goal: Absence of Hospital-Acquired Illness or Injury  Outcome: Ongoing, Progressing  Intervention: Identify and Manage Fall Risk  Flowsheets (Taken 11/18/2023 1810)  Safety Promotion/Fall Prevention:   assistive device/personal item within reach   bedside commode chair   commode/urinal/bedpan at bedside   Fall Risk signage in place   family to remain at bedside   Fall Risk reviewed with patient/family   nonskid shoes/socks when out of bed   side rails raised x 2   instructed to call staff for mobility   supervised activity   Supervised toileting - stay within arms reach  Intervention: Prevent Skin Injury  Flowsheets (Taken 11/18/2023 1810)  Body Position: position changed independently  Skin Protection: adhesive use limited  Intervention: Prevent and Manage VTE (Venous Thromboembolism) Risk  Flowsheets (Taken 11/18/2023 1810)  Activity Management: Up to bedside commode - L3  VTE Prevention/Management:   ambulation promoted   bleeding precations maintained  Range of Motion: active ROM (range of motion) encouraged  Intervention: Prevent Infection  Flowsheets (Taken 11/18/2023 1810)  Infection Prevention: hand hygiene promoted

## 2023-11-19 NOTE — PLAN OF CARE
Problem: Adult Inpatient Plan of Care  Goal: Plan of Care Review  Outcome: Ongoing, Progressing     Problem: Fall Injury Risk  Goal: Absence of Fall and Fall-Related Injury  Outcome: Ongoing, Progressing     Problem: Skin Injury Risk Increased  Goal: Skin Health and Integrity  Outcome: Ongoing, Progressing   RN decreased O2 to 0.5L NC to attempt to wean pt off O2 before discharge. Mom and family at bedside. Pt in NSR on tele. RN instructed pt and mom to call for RN or PCT when needing to ambulate. Both verbalized understanding.

## 2023-11-19 NOTE — SUBJECTIVE & OBJECTIVE
Interval History: Did well overnight  AFVSS on RA    Review of Systems   Constitutional: Negative.   HENT: Negative.     Eyes: Negative.    Cardiovascular: Negative.    Respiratory: Negative.     Endocrine: Negative.    Hematologic/Lymphatic: Negative.    Skin: Negative.    Musculoskeletal: Negative.    Gastrointestinal: Negative.    Genitourinary: Negative.    Neurological: Negative.    Psychiatric/Behavioral: Negative.       Medications:  Continuous Infusions:  Scheduled Meds:   acetaminophen  650 mg Oral Q8H    amiodarone  400 mg Oral BID    aspirin  325 mg Oral Daily    DULoxetine  60 mg Oral Daily    elviteg-cob-emtri-tenof ALAFEN  1 tablet Oral Daily    famotidine  20 mg Oral Daily    fluticasone furoate-vilanteroL  1 puff Inhalation Daily    furosemide  80 mg Oral BID    heparin (porcine)  5,000 Units Subcutaneous Q8H    LIDOcaine  1 patch Transdermal Q24H    methocarbamoL  500 mg Oral TID    metoprolol succinate  25 mg Oral Daily    montelukast  10 mg Oral Daily    polyethylene glycol  17 g Oral BID    potassium chloride  20 mEq Oral BID    risperiDONE  2 mg Oral BID    senna-docusate 8.6-50 mg  1 tablet Oral BID     PRN Meds:sodium chloride 0.9%, albuterol-ipratropium, calcium gluconate IVPB, calcium gluconate IVPB, calcium gluconate IVPB, dextromethorphan-guaiFENesin  mg, dextrose 10%, dextrose 10%, haloperidol lactate, magnesium sulfate IVPB, magnesium sulfate IVPB, ondansetron, oxyCODONE, oxyCODONE, potassium bicarbonate, potassium bicarbonate, potassium bicarbonate, potassium, sodium phosphates, potassium, sodium phosphates, potassium, sodium phosphates, prochlorperazine, sodium chloride 0.9%     Objective:     Vital Signs (Most Recent):  Temp: 97.4 °F (36.3 °C) (11/19/23 0756)  Pulse: 66 (11/19/23 0756)  Resp: 18 (11/19/23 0756)  BP: (!) 87/51 (11/19/23 0756)  SpO2: (!) 91 % (11/19/23 0756) Vital Signs (24h Range):  Temp:  [97.4 °F (36.3 °C)-98.3 °F (36.8 °C)] 97.4 °F (36.3 °C)  Pulse:  [58-78]  66  Resp:  [17-20] 18  SpO2:  [91 %-98 %] 91 %  BP: ()/(51-63) 87/51     Weight: 68.8 kg (151 lb 10.8 oz)  Body mass index is 30.63 kg/m².    SpO2: (!) 91 %       Intake/Output - Last 3 Shifts         11/17 0700  11/18 0659 11/18 0700  11/19 0659 11/19 0700  11/20 0659    P.O. 240 716     I.V. (mL/kg) 1.6 (0)      Total Intake(mL/kg) 241.6 (3.2) 716 (10.4)     Urine (mL/kg/hr) 130 (0.1)      Stool 0      Chest Tube       Total Output 130      Net +111.6 +716            Urine Occurrence 2 x 3 x     Stool Occurrence 2 x 2 x             Lines/Drains/Airways       Peripheral Intravenous Line  Duration                  Peripheral IV - Single Lumen 11/17/23 0817 20 G Distal;Posterior;Right Forearm 2 days                     Physical Exam  Vitals and nursing note reviewed.   Constitutional:       Appearance: Normal appearance.   HENT:      Head: Normocephalic.      Mouth/Throat:      Mouth: Mucous membranes are moist.   Eyes:      Extraocular Movements: Extraocular movements intact.      Pupils: Pupils are equal, round, and reactive to light.   Cardiovascular:      Rate and Rhythm: Normal rate and regular rhythm.   Pulmonary:      Effort: Pulmonary effort is normal.   Abdominal:      General: Abdomen is flat.      Palpations: Abdomen is soft.   Musculoskeletal:         General: Normal range of motion.      Cervical back: Neck supple.   Skin:     General: Skin is warm.      Comments: Sternal incision c/d/i   Neurological:      General: No focal deficit present.      Mental Status: She is alert and oriented to person, place, and time.   Psychiatric:         Mood and Affect: Mood normal.         Behavior: Behavior normal.            Significant Labs:  All pertinent labs from the last 24 hours have been reviewed.    Significant Diagnostics:  I have reviewed all pertinent imaging results/findings within the past 24 hours.

## 2023-11-19 NOTE — PT/OT/SLP PROGRESS
Occupational Therapy   Treatment    Name: Kendar Will  MRN: [id number]  Admitting Diagnosis:  S/P aortic valve replacement  6 Days Post-Op    Recommendations:     Discharge Recommendations: Low Intensity Therapy  Discharge Equipment Recommendations:  bedside commode  Barriers to discharge:       Assessment:     Kendra Will is a 48 y.o. female with a medical diagnosis of S/P aortic valve replacement.  Pt presents with decreased endurance and impaired mobility performance as limited by cardiovascular status and generalized weakness. Pt found upright in bed and agreeable for therapy with pt's mother present and supportive of session. Session focused on bedroom mobility, standing ADLs at sink, and toileting today. Pt able to recall 2/3 sternal precautions with notable improvements in overall balance capacity. Pt was CGA-HHA throughout. POC updated from high to low intensity today.  Patient continues to demonstrate the need for low intensity therapy on a scheduled basis exhibited by decreased independence with self-care and functional mobility   Performance deficits affecting function are weakness, gait instability, impaired balance, impaired endurance, impaired self care skills, impaired functional mobility, decreased safety awareness, impaired cardiopulmonary response to activity, pain, orthopedic precautions.     Rehab Prognosis:  Good; patient would benefit from acute skilled OT services to address these deficits and reach maximum level of function.       Plan:     Patient to be seen 5 x/week to address the above listed problems via self-care/home management, therapeutic activities, therapeutic exercises  Plan of Care Expires: 12/13/23  Plan of Care Reviewed with: patient, mother    Subjective     Chief Complaint: chest discomfort at incision site  Patient/Family Comments/goals: to leave hospital   Pain/Comfort:  Pain Rating 1: 0/10  Pain Rating Post-Intervention 1: 0/10  Pain Rating Post-Intervention 2:  0/10    Objective:     Communicated with: RN prior to session.  Patient found HOB elevated with telemetry upon OT entry to room.    General Precautions: Standard, fall, sternal    Orthopedic Precautions:N/A  Braces: N/A  Respiratory Status: Room air     Occupational Performance:     Bed Mobility:    Patient completed Rolling/Turning to Left with  contact guard assistance  Patient completed Scooting/Bridging with contact guard assistance  Patient completed Supine to Sit with contact guard assistance     Functional Mobility/Transfers:  Patient completed Sit <> Stand Transfer with contact guard assistance  with  no assistive device   Patient completed Bed <> Chair Transfer using Step Transfer technique with contact guard assistance with no assistive device  Patient completed Toilet Transfer Step Transfer technique with contact guard assistance with  no AD  Functional Mobility: Pt stood and mobilized in room and into restroom for 5 minutes of standing ADLs. Pt then toileted (needed A for standing pericare). Pt washed hands at sink and mobilized across room to bedside chair.    Activities of Daily Living:  Grooming: stand by assistance washing face and brushing teeth at sink. Pt later washed hands   Upper Body Dressing: minimum assistance donning gown at EOB   Toileting: total assistance for standing pericare      [facility] 6 Click ADL: 21    Treatment & Education:  Pt educated on role of occupational therapy, POC, and safety during ADLs and functional mobility. Pt and OT discussed importance of safe, continued mobility to optimize daily living skills. Pt verbalized understanding.     White board updated during session. Pt given instruction to call for medical staff/nurse for assistance.       Patient left up in chair with all lines intact, call button in reach, RN notified, and pt's mother present    GOALS:   Multidisciplinary Problems       Occupational Therapy Goals       Not on file              Multidisciplinary  Problems (Resolved)          Problem: Occupational Therapy    Goal Priority Disciplines Outcome Interventions   Occupational Therapy Goal   (Resolved)     OT, PT/OT Met    Description: Goals to be met by: 11/29/2023     Patient will increase functional independence with ADLs by performing:    UE Dressing with Minimal Assistance.  LE Dressing with Moderate Assistance.  Grooming while EOB with Minimal Assistance.  Toileting from bedside commode with Minimal Assistance for hygiene and clothing management.   Supine to sit with Minimal Assistance.  Stand pivot transfer with Minimal Assistance.  Toilet transfer to bedside commode with Minimal Assistance.                         Time Tracking:     OT Date of Treatment: 11/19/23  OT Start Time: 0853  OT Stop Time: 0912  OT Total Time (min): 19 min    Billable Minutes:Self Care/Home Management 19 min    OT/KOBY: OT          11/19/2023

## 2023-11-19 NOTE — NURSING
Pt discharged per MD orders.  Tele discontinued and returned to station.  IV discontinued; catheter tip intact x1.  Medication list and prescriptions reviewed; prescriptions sent to pt preferred pharmacy and printed prescriptions provided.  Pt verbalizes understanding of all written and verbal discharge instructions.  Pt awaiting family/escort arrival.  Will continue to monitor.

## 2023-11-19 NOTE — HOSPITAL COURSE
On ***, the patient was taken to the Operating Room for the above stated procedure. Please see the previously dictated operative report for complete details. Postoperatively, the patient was taken from the  Operating Room to the ICU where the vital signs were monitored and pain was kept under control. The patient was weaned from the drips and extubated in the ICU per protocol. Once hemodynamically stable, the patient was transferred to the Cardiac Step-Down floor for continued strengthening and ambulation. Post operative course complicated ***. On postoperative day ***, the patient was ready for discharge to home. At the time of discharge, the patient was ambulating unassisted. Pain was well controlled with oral analgesics and the patient was tolerating the diet.    MOBILITY AND ACTIVITY:   As tolerated. Patient may shower. No heavy lifting of greater than 5 pounds and no driving.     DIET:   An 1800-calorie ADA with a 1500 mL fluid restriction.     WOUND CARE INSTRUCTIONS:   Check for redness, swelling and drainage around the  incision or wound. Patient is to call for any obvious bleeding, drainage, pus from the wound, unusual problems or difficulties or temperature of greater than 101   degrees.     FOLLOW UP:   Follow up with Dr. Montgomery in approximately 5 weeks. Prior to this  appointment, the patient will have a chest x-ray and EKG.     Patient not placed on [employer] at the time of discharge due to potential for hypotension      DISCHARGE CONDITION:   At the time of discharge, the patient was in sinus rhythm and afebrile with stable vital signs.

## 2023-11-19 NOTE — ASSESSMENT & PLAN NOTE
48 y.o. female with h/o  bicuspid aortic valve with severe aortic stenosis, persistent left superior vena cava, prior stroke, asthma, bipolar disorder, and HIV now s/p Ross procedure on 11/13/23    Cardiac diet   Sternal precautions  ASA, Statin  Toprol XL 25mg  BID Lasix  Continue PO Amiodarone  Home psych meds  Home HIV meds  DVT ppx  OOBTC, Ambulate QID    D/c home today

## 2023-11-19 NOTE — DISCHARGE SUMMARY
[facility] - Cardiology Stepdown  Cardiothoracic Surgery  Discharge Summary      Patient Name: Kendra Will  MRN: [id number]  Admission Date: 11/13/2023  Hospital Length of Stay: 6 days  Discharge Date and Time:  11/19/2023 11:12 AM  Attending Physician: Nicola Montgomery MD   Discharging Provider: Venkatesh Ibrahim MD  Primary Care Provider: Janett Gonzalez MD    HPI:   No notes on file    Procedure(s) (LRB):  REPLACEMENT, AORTIC VALVE, USING ROSS PROCEDURE (N/A)      Indwelling Lines/Drains at time of discharge:   Lines/Drains/Airways       None                 Hospital Course: On 11/13/23, the patient was taken to the Operating Room for the above stated procedure. Please see the previously dictated operative report for complete details. Postoperatively, the patient was taken from the  Operating Room to the ICU where the vital signs were monitored and pain was kept under control. The patient was weaned from the drips and extubated in the ICU per protocol. Once hemodynamically stable, the patient was transferred to the Cardiac Step-Down floor for continued strengthening and ambulation. Post operative course was uncomplicated except for development of atrial fibrillation post-operatively for which Amiodarone was started. On postoperative day 6, the patient was ready for discharge to home. At the time of discharge, the patient was ambulating unassisted. Pain was well controlled with oral analgesics and the patient was tolerating the diet.    MOBILITY AND ACTIVITY:   As tolerated. Patient may shower. No heavy lifting of greater than 5 pounds and no driving.     DIET:   An 1800-calorie ADA with a 1500 mL fluid restriction.     WOUND CARE INSTRUCTIONS:   Check for redness, swelling and drainage around the  incision or wound. Patient is to call for any obvious bleeding, drainage, pus from the wound, unusual problems or difficulties or temperature of greater than 101   degrees.     FOLLOW UP:   Follow up with Dr. Montgomery in  approximately 5 weeks. Prior to this  appointment, the patient will have a chest x-ray and EKG.     Patient not placed on Ace-Inhibitor at the time of discharge due to potential for hypotension      DISCHARGE CONDITION:   At the time of discharge, the patient was in sinus rhythm and afebrile with stable vital signs.     Goals of Care Treatment Preferences:  Code Status: Full Code      Consults (From admission, onward)          Status Ordering Provider     Inpatient consult to Registered Dietitian/Nutritionist  Once        Provider:  (Not yet assigned)    Completed TARA DELAROSA     Inpatient consult to Cardiac Rehab  Once        Provider:  (Not yet assigned)    Completed GONZALES INFANTE            Significant Diagnostic Studies: Labs: All labs within the past 24 hours have been reviewed    Pending Diagnostic Studies:       None            No new Assessment & Plan notes have been filed under this hospital service since the last note was generated.  Service: Cardiothoracic Surgery    Final Active Diagnoses:    Diagnosis Date Noted POA    PRINCIPAL PROBLEM:  S/P aortic valve replacement [Z95.2] 11/13/2023 Not Applicable    Acute blood loss anemia [D62] 11/14/2023 Yes    Transient hyperglycemia post procedure [R73.9] 11/13/2023 Yes    Asthma [J45.909] 11/01/2023 Yes    Nonrheumatic mitral valve regurgitation [I34.0] 09/05/2023 Yes    Nonrheumatic aortic valve insufficiency [I35.1] 09/05/2023 Yes    Nonrheumatic aortic valve stenosis [I35.0] 09/05/2023 Yes    Anxiety [F41.9] 04/12/2018 Yes      Problems Resolved During this Admission:      Discharged Condition: good    Disposition: Home or Self Care    Follow Up:   Follow-up Information       Nicola Montgomery MD Follow up in 5 week(s).    Specialties: Cardiothoracic Surgery, Transplant, Vascular Surgery  Why: post op Ross  Contact information:  [address] [facility] [id number] [phone]                           Patient Instructions:      Diet Cardiac      Notify your health care provider if you experience any of the following:  difficulty breathing or increased cough     Notify your health care provider if you experience any of the following:  redness, tenderness, or signs of infection (pain, swelling, redness, odor or green/yellow discharge around incision site)     Notify your health care provider if you experience any of the following:  severe uncontrolled pain     Notify your health care provider if you experience any of the following:  persistent nausea and vomiting or diarrhea     Notify your health care provider if you experience any of the following:  temperature >100.4     No dressing needed     Activity as tolerated     Medications:  Reconciled Home Medications:      Medication List        START taking these medications      amiodarone 200 MG Tab  Commonly known as: PACERONE  Take 1 tablet (200 mg total) by mouth once daily.     aspirin 325 MG tablet  Take 1 tablet (325 mg total) by mouth once daily.  Start taking on: November 20, 2023     DULoxetine 60 MG capsule  Commonly known as: CYMBALTA  Take 1 capsule (60 mg total) by mouth once daily.  Start taking on: November 20, 2023     furosemide 80 MG tablet  Commonly known as: LASIX  Take 1 tablet (80 mg total) by mouth once daily. for 5 days  Start taking on: November 20, 2023     methocarbamoL 500 MG Tab  Commonly known as: ROBAXIN  Take 1 tablet (500 mg total) by mouth 3 (three) times daily. for 10 days     metoprolol succinate 25 MG 24 hr tablet  Commonly known as: TOPROL-XL  Take 1 tablet (25 mg total) by mouth once daily.  Start taking on: November 20, 2023     oxyCODONE 5 MG immediate release tablet  Commonly known as: ROXICODONE  Take 1 tablet (5 mg total) by mouth every 4 (four) hours as needed for Pain.     polyethylene glycol 17 gram Pwpk  Commonly known as: GLYCOLAX  Take 17 g by mouth 2 (two) times daily as needed for Constipation.     potassium chloride SA 20 MEQ tablet  Commonly known as:  K-DUR,KLOR-CON  Take 1 tablet (20 mEq total) by mouth once daily. for 5 days  Start taking on: November 20, 2023            CONTINUE taking these medications      albuterol 90 mcg/actuation inhaler  Commonly known as: PROVENTIL/VENTOLIN HFA  Inhale 2 puffs into the lungs every 6 (six) hours as needed for Wheezing. Rescue     albuterol-ipratropium 2.5 mg-0.5 mg/3 mL nebulizer solution  Commonly known as: DUO-NEB  Take 3 mLs by nebulization every 6 (six) hours while awake. Rescue     cetirizine 10 MG tablet  Commonly known as: ZYRTEC  Take 1 tablet (10 mg total) by mouth once daily.     cyclobenzaprine 10 MG tablet  Commonly known as: FLEXERIL  Take 1 tablet (10 mg total) by mouth 3 (three) times daily as needed for Muscle spasms (muscle spasm).     ergocalciferol 50,000 unit Cap  Commonly known as: VITAMIN D2  Take 1 capsule (50,000 Units total) by mouth every 7 days.     fluticasone propionate 50 mcg/actuation nasal spray  Commonly known as: FLONASE  1 spray (50 mcg total) by Each Nostril route once daily.     fluticasone-salmeterol 230-21 mcg/dose 230-21 mcg/actuation Hfaa inhaler  Commonly known as: ADVAIR HFA  Inhale 2 puffs into the lungs 2 (two) times daily. Controller     GENVOYA [phone] mg Tab  Generic drug: elviteg-cob-emtri-tenof ALAFEN  Take 1 tablet by mouth once daily.     montelukast 10 mg tablet  Commonly known as: SINGULAIR  Take 1 tablet (10 mg total) by mouth once daily.     risperiDONE 3 MG Tab  Commonly known as: RISPERDAL  Take 1 tablet (3 mg total) by mouth 2 (two) times daily.            STOP taking these medications      ibuprofen 800 MG tablet  Commonly known as: CHERELLE ANTHONY            Time spent on the discharge of patient: 15 minutes    Venkatesh Ibrahim MD  Cardiothoracic Surgery  [facility] - Cardiology Stepdown

## 2023-11-19 NOTE — PROGRESS NOTES
[facility] - Cardiology Stepdown  Cardiothoracic Surgery  Progress Note    Patient Name: Kendra Will  MRN: [id number]  Admission Date: 11/13/2023  Hospital Length of Stay: 6 days  Code Status: Full Code   Attending Physician: Nicola Montgomery MD   Referring Provider: Nicola Montgomery MD  Principal Problem:S/P aortic valve replacement      Subjective:     Post-Op Info:  Procedure(s) (LRB):  REPLACEMENT, AORTIC VALVE, USING ROSS PROCEDURE (N/A)   6 Days Post-Op     Interval History: Did well overnight  AFVSS on RA    Review of Systems   Constitutional: Negative.   HENT: Negative.     Eyes: Negative.    Cardiovascular: Negative.    Respiratory: Negative.     Endocrine: Negative.    Hematologic/Lymphatic: Negative.    Skin: Negative.    Musculoskeletal: Negative.    Gastrointestinal: Negative.    Genitourinary: Negative.    Neurological: Negative.    Psychiatric/Behavioral: Negative.       Medications:  Continuous Infusions:  Scheduled Meds:   acetaminophen  650 mg Oral Q8H    amiodarone  400 mg Oral BID    aspirin  325 mg Oral Daily    DULoxetine  60 mg Oral Daily    elviteg-cob-emtri-tenof ALAFEN  1 tablet Oral Daily    famotidine  20 mg Oral Daily    fluticasone furoate-vilanteroL  1 puff Inhalation Daily    furosemide  80 mg Oral BID    heparin (porcine)  5,000 Units Subcutaneous Q8H    LIDOcaine  1 patch Transdermal Q24H    methocarbamoL  500 mg Oral TID    metoprolol succinate  25 mg Oral Daily    montelukast  10 mg Oral Daily    polyethylene glycol  17 g Oral BID    potassium chloride  20 mEq Oral BID    risperiDONE  2 mg Oral BID    senna-docusate 8.6-50 mg  1 tablet Oral BID     PRN Meds:sodium chloride 0.9%, albuterol-ipratropium, calcium gluconate IVPB, calcium gluconate IVPB, calcium gluconate IVPB, dextromethorphan-guaiFENesin  mg, dextrose 10%, dextrose 10%, haloperidol lactate, magnesium sulfate IVPB, magnesium sulfate IVPB, ondansetron, oxyCODONE, oxyCODONE, potassium bicarbonate, potassium  bicarbonate, potassium bicarbonate, potassium, sodium phosphates, potassium, sodium phosphates, potassium, sodium phosphates, prochlorperazine, sodium chloride 0.9%     Objective:     Vital Signs (Most Recent):  Temp: 97.4 °F (36.3 °C) (11/19/23 0756)  Pulse: 66 (11/19/23 0756)  Resp: 18 (11/19/23 0756)  BP: (!) 87/51 (11/19/23 0756)  SpO2: (!) 91 % (11/19/23 0756) Vital Signs (24h Range):  Temp:  [97.4 °F (36.3 °C)-98.3 °F (36.8 °C)] 97.4 °F (36.3 °C)  Pulse:  [58-78] 66  Resp:  [17-20] 18  SpO2:  [91 %-98 %] 91 %  BP: ()/(51-63) 87/51     Weight: 68.8 kg (151 lb 10.8 oz)  Body mass index is 30.63 kg/m².    SpO2: (!) 91 %       Intake/Output - Last 3 Shifts         11/17 0700  11/18 0659 11/18 0700  11/19 0659 11/19 0700  11/20 0659    P.O. 240 716     I.V. (mL/kg) 1.6 (0)      Total Intake(mL/kg) 241.6 (3.2) 716 (10.4)     Urine (mL/kg/hr) 130 (0.1)      Stool 0      Chest Tube       Total Output 130      Net +111.6 +716            Urine Occurrence 2 x 3 x     Stool Occurrence 2 x 2 x             Lines/Drains/Airways       Peripheral Intravenous Line  Duration                  Peripheral IV - Single Lumen 11/17/23 0817 20 G Distal;Posterior;Right Forearm 2 days                     Physical Exam  Vitals and nursing note reviewed.   Constitutional:       Appearance: Normal appearance.   HENT:      Head: Normocephalic.      Mouth/Throat:      Mouth: Mucous membranes are moist.   Eyes:      Extraocular Movements: Extraocular movements intact.      Pupils: Pupils are equal, round, and reactive to light.   Cardiovascular:      Rate and Rhythm: Normal rate and regular rhythm.   Pulmonary:      Effort: Pulmonary effort is normal.   Abdominal:      General: Abdomen is flat.      Palpations: Abdomen is soft.   Musculoskeletal:         General: Normal range of motion.      Cervical back: Neck supple.   Skin:     General: Skin is warm.      Comments: Sternal incision c/d/i   Neurological:      General: No focal deficit  present.      Mental Status: She is alert and oriented to person, place, and time.   Psychiatric:         Mood and Affect: Mood normal.         Behavior: Behavior normal.            Significant Labs:  All pertinent labs from the last 24 hours have been reviewed.    Significant Diagnostics:  I have reviewed all pertinent imaging results/findings within the past 24 hours.  Assessment/Plan:     * S/P aortic valve replacement  48 y.o. female with h/o  bicuspid aortic valve with severe aortic stenosis, persistent left superior vena cava, prior stroke, asthma, bipolar disorder, and HIV now s/p Ross procedure on 11/13/23    Cardiac diet   Sternal precautions  ASA, Statin  Toprol XL 25mg  BID Lasix  Continue PO Amiodarone  Home psych meds  Home HIV meds  DVT ppx  OOBTC, Ambulate QID    D/c home today      Venkatesh Ibrahim MD  Cardiothoracic Surgery  [facility] - Cardiology Stepdown

## 2023-11-19 NOTE — DISCHARGE INSTRUCTIONS
Discharge Instructions    The hospital and staff members extend their best wishes to you as you are discharged. Because we are most concerned with your health, we suggest you carefully read the following instructions.    Activity Instructions  Ambulate.  No strenuous Exercise   May shower with soap and water.  Do not let shower hit incision directly.  Dry well.       Restrictions:   Sternal precautions.  No heavy lifting more than 5lbs for 6 weeks.  No driving for 4 weeks.     Diet:   Low Salt/Chol/Fat diet    Wound Care Instruction  Keep incision/wound dry-no tub baths.  Clean with soap and water daily. Do not apply ointments or powder to incision sites.   Remove old chest tube dressing tomorrow and leave open to air unless drainage noted then change daily.      When to call the doctors:  For any obvious bleeding, Fever over 101 degrees, Redness or swelling around the incision, Drainage(pus) from the wound, Unusual problems or difficulties, Persistent pain not relieved by the pain medication.    Call [phone] to have the operators page the doctor on call for Cardiothoracic surgery after hours with questions or concerns      Current pain management regimen    IF PAIN INTENSIFIES OR PAIN IS UNRELIEVED WITH MEDICATIONS AS ORDERED, CALL YOUR DOCTOR

## 2023-11-20 ENCOUNTER — TELEPHONE (OUTPATIENT)
Dept: CARDIOTHORACIC SURGERY | Facility: CLINIC | Age: 48
End: 2023-11-20
Payer: MEDICAID

## 2023-11-20 NOTE — PLAN OF CARE
[facility] - Cardiology Stepdown  Discharge Final Note    Primary Care Provider: Janett Gonzalez MD    Expected Discharge Date: 11/19/2023    Final Discharge Note (most recent)       Final Note - 11/14/23 1213          Final Note    Assessment Type Discharge Planning Assessment                                  Contact Info       Nicola Montgomery MD   Specialty: Cardiothoracic Surgery, Transplant, Vascular Surgery    [address]  [facility] [address]   Phone: [phone]       Next Steps: Follow up in 5 week(s)    Instructions: post op Ross          Future Appointments   Date Time Provider Department Center   1/3/2024  8:45 AM EKG, APPT [facility] EKG [facility]   1/3/2024  9:10 AM Nicola Montgomery MD [facility] CARDVAS [facility]   1/29/2024  9:00 AM Jose Wray MD [facility] CARDIO [address]     Marisel Jonas RN  Case Management  [facility]  [phone]

## 2023-11-20 NOTE — TELEPHONE ENCOUNTER
Called pt following hospital discharge.  Pt d/c'd over the weekend.       Reviewed daily inspection and cleaning of surgical incisions and instructed pt to call the clinic with any questions or concerns.  Pt will be mailed handout  (see below) which contains detailed instructions on daily wound care inspection and care.  Pt reminded of 5 pound lifting, pushing, and pulling restrictions for the first 4 weeks following surgery and to refrain from driving until released by Dr. Montgomery.     Showering:   If your incisions are healing and there is no drainage - it is ok to take a shower.  Shower with your back to the shower spray.  It is ok for your incision to get wet, but the shower spray should not directly hit your chest.  Do not soak in a tub.  The water temperature should be warm -- not too hot or cold. Extreme water temperatures can cause you to feel faint.  It is expected that you wash your incisions when you shower, however only use soap and water to cleanse the sites.  Use normal bar soap, not perfumed soap or body wash. During your recovery, do not try a new brand of soap.  Place soapy water on your hand or a clean washcloth and gently wash your incisions using an up-and-down motion.  Do not apply ointments, oils, or salves to your incisions.  Pat the skin gently to dry.    Wound Inspection:   Wash your hands before and after caring for or touching your incisions.   Look in the mirror daily. Inspect your incisions for redness, drainage and warmth. Your incisions should be healing; there should NOT be an increase in opening.   Gently wash your incisions everyday with soap and warm water using a clean washcloth, or your hand and light touch.   Gently pat dry with a clean towel.   You do not need to cover your incisions unless they are draining.    If you are experiencing drainage, it is important to call your doctor.  Monday - Friday, from 8:00am - 5pm, call [phone].  Outside of these hours, please call  [phone] and ask for cardiothoracic surgeon on call.      When to call your doctor:  Increased drainage or oozing from the incision(s)  Increased opening of the incision line(s) - there should not be gaps or pulling apart areas of the incision(s)  Redness along the incision(s) - if the incisions were red or pink when you left the hospital, they should be improving and not becoming more red  Warmth along the incision line(s)  Increased body temperature / Fever - greater than 101 degrees Fahrenheit  If you have diabetes and your blood sugar levels begin to vary     Reminded pt walk as much as she can.      Instructed pt to perform daily weights.  Pt instructed to notify the clinic if he has a weight gain greater than 3 pounds in one day.      Pt reminded about post-op appts on 1/3.  Pt instructed to call the clinic with any questions or concerns.  Pt verbalized understanding to all instructions.     Future Appointments   Date Time Provider Department Center   1/3/2024  8:45 AM EKG, APPT [facility] EKG [facility]   1/3/2024  9:10 AM Nicola Montgomery MD [facility] CARDVAS [facility]   1/29/2024  9:00 AM Jose Wray MD [facility] CARDIO [address]     Julie Haase RN  CTS Nurse Navigator [phone]

## 2023-11-29 ENCOUNTER — TELEPHONE (OUTPATIENT)
Dept: CARDIOTHORACIC SURGERY | Facility: CLINIC | Age: 48
End: 2023-11-29
Payer: MEDICAID

## 2023-11-29 NOTE — TELEPHONE ENCOUNTER
Called patient and told her she can use OTC medication to treat cold symptoms and long as it doesn't have ibuprofen or pseudoephedrine. If she need something stronger for cough, she should call her PCP.    Explained we could not prescribe any additional pain medication and she should continue to take Tylenol 1000mg every 8 hours on a schedule. She could also use OTC salonpas lidocaine patches that she can get OTC. Explained she cut then to fit and use them anywhere except incision.     Julie Haase RN  CTS Nurse Navigator [phone]         ----- Message from Svetlana Power sent at 11/29/2023  8:42 AM CST -----  Contact: [phone]  PATIENTCALL     Pt is calling to speak with someone in provider office regarding she states she woke up yesterday morning and today not feeling well states she has a cough an she wants to know if you can send something to the pharmacy for that and she also, would like to see if she can get a couple more of pain pills she is asking for a return call please call pt at [phone]

## 2024-01-02 ENCOUNTER — TELEPHONE (OUTPATIENT)
Dept: CARDIOTHORACIC SURGERY | Facility: CLINIC | Age: 49
End: 2024-01-02
Payer: MEDICAID

## 2024-01-02 NOTE — TELEPHONE ENCOUNTER
Called to confirm appt with patient. Left detailed message with appt times and locations and my call back number.    Julie Haase RN  CTS Nurse Navigator [phone]

## 2024-01-03 ENCOUNTER — HOSPITAL ENCOUNTER (OUTPATIENT)
Dept: CARDIOLOGY | Facility: CLINIC | Age: 49
Discharge: HOME | End: 2024-01-03
Payer: MEDICAID

## 2024-01-03 ENCOUNTER — DOCUMENTATION ONLY (OUTPATIENT)
Dept: CARDIOTHORACIC SURGERY | Facility: CLINIC | Age: 49
End: 2024-01-03

## 2024-01-03 ENCOUNTER — OFFICE VISIT (OUTPATIENT)
Dept: CARDIOTHORACIC SURGERY | Facility: CLINIC | Age: 49
End: 2024-01-03
Payer: MEDICAID

## 2024-01-03 VITALS
WEIGHT: 134.5 LBS | DIASTOLIC BLOOD PRESSURE: 67 MMHG | HEART RATE: 89 BPM | SYSTOLIC BLOOD PRESSURE: 149 MMHG | BODY MASS INDEX: 27.12 KG/M2 | HEIGHT: 59 IN

## 2024-01-03 DIAGNOSIS — Z95.4 S/P AORTIC VALVE REPLACEMENT USING ROSS PROCEDURE: Primary | ICD-10-CM

## 2024-01-03 DIAGNOSIS — Z98.890 POST-OPERATIVE STATE: ICD-10-CM

## 2024-01-03 PROCEDURE — 3077F SYST BP >= 140 MM HG: CPT | Mod: CPTII,,, | Performed by: THORACIC SURGERY (CARDIOTHORACIC VASCULAR SURGERY)

## 2024-01-03 PROCEDURE — 99024 POSTOP FOLLOW-UP VISIT: CPT | Mod: ,,, | Performed by: THORACIC SURGERY (CARDIOTHORACIC VASCULAR SURGERY)

## 2024-01-03 PROCEDURE — 99999 PR PBB SHADOW E&M-EST. PATIENT-LVL III: CPT | Mod: PBBFAC,,, | Performed by: THORACIC SURGERY (CARDIOTHORACIC VASCULAR SURGERY)

## 2024-01-03 PROCEDURE — 93010 ELECTROCARDIOGRAM REPORT: CPT | Mod: S$PBB,,, | Performed by: INTERNAL MEDICINE

## 2024-01-03 PROCEDURE — 3078F DIAST BP <80 MM HG: CPT | Mod: CPTII,,, | Performed by: THORACIC SURGERY (CARDIOTHORACIC VASCULAR SURGERY)

## 2024-01-03 PROCEDURE — 93005 ELECTROCARDIOGRAM TRACING: CPT | Mod: PBBFAC | Performed by: INTERNAL MEDICINE

## 2024-01-03 PROCEDURE — 99213 OFFICE O/P EST LOW 20 MIN: CPT | Mod: PBBFAC | Performed by: THORACIC SURGERY (CARDIOTHORACIC VASCULAR SURGERY)

## 2024-01-03 NOTE — PROGRESS NOTES
Pt instructed to follow-up with his cardiologist, Dr. Wray  .    Explained to pt she is still under a 20lb  lifting restriction from 6 weeks to 12 weeks.  After 12 weeks she will have no further restrictions.    Pt reminded to continue washing incisions everyday with soap and water.  Pt was provided with a copy of AVS and instructed on medication changes.  Pt verbalized understanding of all instructions.    Julie Haase RN  [facility] Nurse Navigator [phone]

## 2024-01-03 NOTE — PROGRESS NOTES
"Patient seen and examined. Patient is progressively increasing activity. No significant complaints.     Sternum: stable, incision CDI  EKG: NSR     Assessment:  S/p Surgery on 11/13/23   Aortic Root Replacement with Pulmonary autograft (Ross Procedure) and coronary reimplantation              - Pulmonary root replacement with 30mm Cryopreserved pulmonary homograft              - Valve sparing root replacement technique ("[place] Sleeve") for pulmonary autograft reinforcement using 28mm bulged aortic root woven polyester graft              - Ascending aorta replacement with 24mm woven polyester straight graft              - Closure of patent foramen ovale (primary closure)  Plan:  Continue to maintain sternal precautions- on 12/25 ok to push and pull with arms and lift no more than 20 lbs. On 2/5, sternal restrictions are lifted.   Can begin driving as long as he has power steering  Can begin cardiac rehab in the next couple of weeks  We will refer to cardiology to assume care           No scheduled appointment, RTC prn    I have seen the patient and reviewed the physician assistant's note above. I have personally interviewed and examined the patient at bedside and agree with the findings.     Ms. Will is doing excellent after the Reinforced Ross and PFO closure in November 2023.  She is walking daily and feeling stronger.  She can see me in clinic as needed.  We will obtain yearly TTEs to assess her valve.      Nicola Montgomery MD  Cardiothoracic Surgery  [facility]        "

## 2024-01-03 NOTE — PATIENT INSTRUCTIONS
Please make appointments to check in with your PCP and Cardiologist in the next 2 to 6 weeks.    Continue walking to build up your endurance.     You may drive, if you have power steering.    Continue to clean your wound daily with soap and water.    Your lifting restriction is still in place until you are 12 weeks out from your surgery:     5 pounds first 6 weeks after surgery   20 pounds for weeks 7 - 12 after surgery   12/25 ok to push and pull with arms and lift no more than 20 lbs. On 2/5, sternal restrictions are lifted.      COVID/FLU Precautions:    Continue to take precautions against COVID/FLU. Mask up when around unknown people, wash / sanitize hands frequently. Avoid large groups of people until at least 12 weeks post op.      Thank you for trusting [facility] and Dr Montgomery with your care.  We are honored that you entrusted us with your healthcare needs. Your satisfaction is very important to us and we hope you have been very pleased with your experience at [facility]. After your clinic visit you may receive a survey asking you to rate your clinic experience. We encourage you to take the time to complete the survey as your feedback allows us to identify areas for improvement as well as recognize our staff for their care. We hope that you have received the very best care possible at [facility], as your satisfaction is our top priority.

## 2024-01-16 PROBLEM — R73.9 TRANSIENT HYPERGLYCEMIA POST PROCEDURE: Status: RESOLVED | Noted: 2023-11-13 | Resolved: 2024-01-16

## 2024-01-16 PROBLEM — E78.00 PURE HYPERCHOLESTEROLEMIA: Status: ACTIVE | Noted: 2024-01-16

## 2024-01-16 PROBLEM — Z01.818 PRE-OP EVALUATION: Status: RESOLVED | Noted: 2023-09-05 | Resolved: 2024-01-16

## 2024-01-16 PROBLEM — Z87.74 S/P PATENT FORAMEN OVALE CLOSURE: Status: ACTIVE | Noted: 2024-01-16

## 2024-01-16 PROBLEM — D62 ACUTE BLOOD LOSS ANEMIA: Status: RESOLVED | Noted: 2023-11-14 | Resolved: 2024-01-16

## 2024-01-16 PROBLEM — F34.1 PERSISTENT DEPRESSIVE DISORDER: Status: ACTIVE | Noted: 2024-01-16

## 2024-01-16 PROBLEM — Z95.4 H/O AORTIC VALVE REPLACEMENT USING ROSS PROCEDURE: Status: ACTIVE | Noted: 2023-11-13

## 2024-01-16 PROBLEM — Z95.828 HX OF ASCENDING AORTA REPLACEMENT: Status: ACTIVE | Noted: 2024-01-16

## 2024-01-29 PROBLEM — Z86.79 HISTORY OF AORTIC INSUFFICIENCY: Status: ACTIVE | Noted: 2024-01-29

## 2024-01-29 PROBLEM — I36.1 NONRHEUMATIC TRICUSPID VALVE REGURGITATION: Status: ACTIVE | Noted: 2024-01-29

## 2024-01-29 PROBLEM — I51.89 DIASTOLIC DYSFUNCTION: Status: ACTIVE | Noted: 2024-01-29

## 2024-01-29 PROBLEM — Z86.79 HISTORY OF AORTIC STENOSIS: Status: ACTIVE | Noted: 2024-01-29

## 2024-01-29 PROBLEM — J45.909 ASTHMA: Status: RESOLVED | Noted: 2023-11-01 | Resolved: 2024-01-29

## 2024-01-29 PROBLEM — G25.81 RESTLESS LEG SYNDROME: Status: ACTIVE | Noted: 2024-01-29

## 2024-01-29 PROBLEM — Z21 ASYMPTOMATIC HIV INFECTION, WITH NO HISTORY OF HIV-RELATED ILLNESS: Status: ACTIVE | Noted: 2024-01-29

## 2024-01-29 PROBLEM — I35.1 NONRHEUMATIC AORTIC VALVE INSUFFICIENCY: Status: RESOLVED | Noted: 2023-09-05 | Resolved: 2024-01-29

## 2024-01-29 PROBLEM — I35.0 NONRHEUMATIC AORTIC VALVE STENOSIS: Status: RESOLVED | Noted: 2023-09-05 | Resolved: 2024-01-29

## 2024-07-01 PROBLEM — Q23.81 BICUSPID AORTIC VALVE: Status: RESOLVED | Noted: 2023-09-05 | Resolved: 2024-07-01

## 2024-07-01 PROBLEM — Z87.74 HISTORY OF BICUSPID AORTIC VALVE: Status: ACTIVE | Noted: 2024-07-01

## 2024-07-01 PROBLEM — R07.89 ATYPICAL CHEST PAIN: Status: ACTIVE | Noted: 2024-07-01

## 2024-07-01 PROBLEM — Z98.890 S/P LEFT HEART CATHETERIZATION BY PERCUTANEOUS APPROACH: Status: ACTIVE | Noted: 2024-07-01

## 2024-07-01 PROBLEM — Q23.1 BICUSPID AORTIC VALVE: Status: RESOLVED | Noted: 2023-09-05 | Resolved: 2024-07-01

## 2024-07-01 PROBLEM — R00.2 PALPITATIONS: Status: ACTIVE | Noted: 2024-07-01

## 2024-07-09 ENCOUNTER — PATIENT MESSAGE (OUTPATIENT)
Dept: OTOLARYNGOLOGY | Facility: CLINIC | Age: 49
End: 2024-07-09
Payer: MEDICAID

## 2024-11-12 PROBLEM — E66.3 OVERWEIGHT (BMI 25.0-29.9): Status: ACTIVE | Noted: 2024-11-12

## 2024-11-12 PROBLEM — I37.1 NONRHEUMATIC PULMONARY VALVE INSUFFICIENCY: Status: ACTIVE | Noted: 2024-11-12

## 2024-11-12 PROBLEM — Z21 ASYMPTOMATIC HIV INFECTION, WITH NO HISTORY OF HIV-RELATED ILLNESS: Status: ACTIVE | Noted: 2024-11-12

## 2024-11-12 PROBLEM — J45.909 ASTHMA: Status: ACTIVE | Noted: 2024-11-12

## 2024-11-21 ENCOUNTER — TELEPHONE (OUTPATIENT)
Dept: CARDIOTHORACIC SURGERY | Facility: CLINIC | Age: 49
End: 2024-11-21
Payer: MEDICAID

## 2024-11-21 NOTE — TELEPHONE ENCOUNTER
"Pt called and stated she had an echo and was told her "valve was leaky" by her cardiologist. Pt is concerned about her valve and would like to see surgery to review echo and valve. She has had previous valve replacement with Dr Montgomery. She states she is feeling "worse now than she did prior to valve surgery." Is having SOB and "tired all the time."    Pt scheduled to come in on Dec 4.    Julie Haase RN  CTS Nurse Navigator [phone]      "

## 2024-12-03 ENCOUNTER — TELEPHONE (OUTPATIENT)
Dept: CARDIOTHORACIC SURGERY | Facility: CLINIC | Age: 49
End: 2024-12-03
Payer: MEDICAID

## 2024-12-03 NOTE — TELEPHONE ENCOUNTER
Spoke with pt and confirmed 12/4 appt with Dr. Jerome. Pt verbalized understanding of appt time and location. Denies any questions at this time.

## 2024-12-04 ENCOUNTER — TELEPHONE (OUTPATIENT)
Dept: CARDIOTHORACIC SURGERY | Facility: CLINIC | Age: 49
End: 2024-12-04
Payer: MEDICAID

## 2024-12-04 NOTE — TELEPHONE ENCOUNTER
Called pt to see if they were going to make it to their 11:00 appt with Dr. Jerome; no answer. LVM with reason for call and requested a call back. Contact number provided.

## 2025-01-02 PROBLEM — Z86.79 HISTORY OF AORTIC INSUFFICIENCY: Status: RESOLVED | Noted: 2024-01-29 | Resolved: 2025-01-02

## 2025-01-02 PROBLEM — I36.1 NONRHEUMATIC TRICUSPID VALVE REGURGITATION: Status: RESOLVED | Noted: 2024-01-29 | Resolved: 2025-01-02

## 2025-01-02 PROBLEM — R94.31 ABNORMAL EKG: Status: RESOLVED | Noted: 2023-09-05 | Resolved: 2025-01-02

## 2025-01-02 PROBLEM — R07.89 ATYPICAL CHEST PAIN: Status: RESOLVED | Noted: 2024-07-01 | Resolved: 2025-01-02

## 2025-01-02 PROBLEM — J45.909 ASTHMA: Status: RESOLVED | Noted: 2024-11-12 | Resolved: 2025-01-02

## 2025-01-02 PROBLEM — E66.3 OVERWEIGHT (BMI 25.0-29.9): Status: RESOLVED | Noted: 2024-11-12 | Resolved: 2025-01-02

## 2025-02-14 ENCOUNTER — PATIENT OUTREACH (OUTPATIENT)
Dept: ADMINISTRATIVE | Facility: HOSPITAL | Age: 50
End: 2025-02-14
Payer: MEDICAID

## 2025-04-01 PROBLEM — I27.20 PULMONARY HYPERTENSION: Status: ACTIVE | Noted: 2025-04-01

## 2025-06-23 DIAGNOSIS — I27.20 PULMONARY HYPERTENSION: Primary | ICD-10-CM

## 2025-08-21 ENCOUNTER — OFFICE VISIT (OUTPATIENT)
Facility: CLINIC | Age: 50
End: 2025-08-21
Payer: MEDICAID

## 2025-08-21 ENCOUNTER — TELEPHONE (OUTPATIENT)
Facility: CLINIC | Age: 50
End: 2025-08-21

## 2025-08-21 VITALS
OXYGEN SATURATION: 96 % | BODY MASS INDEX: 27.78 KG/M2 | RESPIRATION RATE: 18 BRPM | HEIGHT: 59 IN | SYSTOLIC BLOOD PRESSURE: 120 MMHG | DIASTOLIC BLOOD PRESSURE: 73 MMHG | HEART RATE: 72 BPM | WEIGHT: 137.81 LBS

## 2025-08-21 DIAGNOSIS — B20 HIV, SYMPTOMATIC: ICD-10-CM

## 2025-08-21 DIAGNOSIS — Q26.1 PERSISTENT LEFT SVC (SUPERIOR VENA CAVA): ICD-10-CM

## 2025-08-21 DIAGNOSIS — R06.09 DYSPNEA ON EXERTION: Primary | ICD-10-CM

## 2025-08-21 DIAGNOSIS — Z95.4 H/O AORTIC VALVE REPLACEMENT USING ROSS PROCEDURE: ICD-10-CM

## 2025-08-21 DIAGNOSIS — Z87.74 HISTORY OF BICUSPID AORTIC VALVE: ICD-10-CM

## 2025-08-21 DIAGNOSIS — I27.20 PULMONARY HYPERTENSION: ICD-10-CM

## 2025-08-21 DIAGNOSIS — Z87.74 S/P PATENT FORAMEN OVALE CLOSURE: ICD-10-CM

## 2025-08-21 DIAGNOSIS — Z86.79 HISTORY OF AORTIC STENOSIS: ICD-10-CM

## 2025-08-21 PROCEDURE — 99999 PR PBB SHADOW E&M-EST. PATIENT-LVL V: CPT | Mod: PBBFAC,,, | Performed by: INTERNAL MEDICINE

## 2025-08-21 PROCEDURE — 99215 OFFICE O/P EST HI 40 MIN: CPT | Mod: PBBFAC | Performed by: INTERNAL MEDICINE

## 2025-08-22 ENCOUNTER — TELEPHONE (OUTPATIENT)
Facility: CLINIC | Age: 50
End: 2025-08-22
Payer: MEDICAID

## 2025-08-25 ENCOUNTER — TELEPHONE (OUTPATIENT)
Dept: TRANSPLANT | Facility: CLINIC | Age: 50
End: 2025-08-25
Payer: MEDICAID

## 2025-08-25 DIAGNOSIS — G47.30 SLEEP APNEA WITH HYPERSOMNOLENCE: Primary | ICD-10-CM

## 2025-08-25 DIAGNOSIS — G47.10 SLEEP APNEA WITH HYPERSOMNOLENCE: Primary | ICD-10-CM

## (undated) DEVICE — Device

## (undated) DEVICE — RETRACTOR OCTOBASE INSERT HOLD

## (undated) DEVICE — HEMOSTAT SURGICEL NU-KNIT 6X9

## (undated) DEVICE — SUT PROLENE 3-0 30 RB-1 BL

## (undated) DEVICE — SUT CTD VICRYL 0 UND CR/CTX

## (undated) DEVICE — DRAPE CVMAX SPLIT ANES SCRN

## (undated) DEVICE — TUBING HF INSUFFLATION W/ FLTR

## (undated) DEVICE — DRAIN CHEST DRY SUCTION

## (undated) DEVICE — BLADE ELECTRO EDGE INSULATED

## (undated) DEVICE — BLADE SAW STERN .076MM 6.1MM L

## (undated) DEVICE — SYR 3CC LUER LOC

## (undated) DEVICE — KIT SAHARA DRAPE DRAW/LIFT

## (undated) DEVICE — TIP YANKAUERS BULB NO VENT

## (undated) DEVICE — SUT 2/0 30IN SILK BLK BRAI

## (undated) DEVICE — GLOVE SURG BIOGEL LATEX SZ 7.5

## (undated) DEVICE — ELECTRODE REM PLYHSV RETURN 9

## (undated) DEVICE — GOWN POLY REINF BRTH SLV XL

## (undated) DEVICE — DRESSING AQUACEL SACRAL 9 X 9

## (undated) DEVICE — BLADE 4IN EDGE INSULATED

## (undated) DEVICE — SUT PROLENE 4-0 RB-1 BL MO

## (undated) DEVICE — KIT URINARY CATH URINE METER

## (undated) DEVICE — GAUZE SPONGE 4X4 12PLY

## (undated) DEVICE — INSERTS STEALTH FIBRA SIZE 5

## (undated) DEVICE — DECANTER FLUID TRNSF WHITE 9IN

## (undated) DEVICE — WIRE INTRAMYOCARDIAL TEMP

## (undated) DEVICE — SUT SILK 2-0 SH 18IN BLACK

## (undated) DEVICE — DRESSING ADH ISLAND 3.6 X 14

## (undated) DEVICE — BIOGLUE 10ML

## (undated) DEVICE — CANNULA VESSEL FREE FLOW

## (undated) DEVICE — TRAY HEART OMC

## (undated) DEVICE — SUT 6 18IN STEEL MONO CCS

## (undated) DEVICE — DRAPE SLUSH WARMER WITH DISC

## (undated) DEVICE — SUT SILK BLK BR. 2 2-60

## (undated) DEVICE — GLOVE BIOGEL PI MICRO SZ 7.5

## (undated) DEVICE — SUT MONOCRYL 4-0 PS-1 UND

## (undated) DEVICE — ADHESIVE DERMABOND ADVANCED

## (undated) DEVICE — NDL 18GA X1 1/2 REG BEVEL

## (undated) DEVICE — COVERS PROBE NR-48 STERILE

## (undated) DEVICE — SUT PROLENE 4-0 SH BLU 36IN

## (undated) DEVICE — SPONGE COTTON TRAY 4X4IN

## (undated) DEVICE — PAD K-THERMIA 24IN X 60IN